# Patient Record
Sex: FEMALE | Race: ASIAN | NOT HISPANIC OR LATINO | URBAN - METROPOLITAN AREA
[De-identification: names, ages, dates, MRNs, and addresses within clinical notes are randomized per-mention and may not be internally consistent; named-entity substitution may affect disease eponyms.]

---

## 2024-04-16 ENCOUNTER — INPATIENT (INPATIENT)
Facility: HOSPITAL | Age: 59
LOS: 5 days | Discharge: HOME | DRG: 287 | End: 2024-04-22
Attending: INTERNAL MEDICINE | Admitting: INTERNAL MEDICINE
Payer: MEDICAID

## 2024-04-16 VITALS
HEART RATE: 61 BPM | HEIGHT: 66 IN | RESPIRATION RATE: 17 BRPM | SYSTOLIC BLOOD PRESSURE: 106 MMHG | DIASTOLIC BLOOD PRESSURE: 70 MMHG | WEIGHT: 130.07 LBS | TEMPERATURE: 100 F | OXYGEN SATURATION: 99 %

## 2024-04-16 DIAGNOSIS — E05.90 THYROTOXICOSIS, UNSPECIFIED WITHOUT THYROTOXIC CRISIS OR STORM: ICD-10-CM

## 2024-04-16 LAB
ADD ON TEST-SPECIMEN IN LAB: SIGNIFICANT CHANGE UP
ADD ON TEST-SPECIMEN IN LAB: SIGNIFICANT CHANGE UP
ALBUMIN SERPL ELPH-MCNC: 3.9 G/DL — SIGNIFICANT CHANGE UP (ref 3.3–5)
ALP SERPL-CCNC: 71 U/L — SIGNIFICANT CHANGE UP (ref 40–120)
ALT FLD-CCNC: 10 U/L — SIGNIFICANT CHANGE UP (ref 10–45)
ANION GAP SERPL CALC-SCNC: 13 MMOL/L — SIGNIFICANT CHANGE UP (ref 5–17)
APPEARANCE UR: CLEAR — SIGNIFICANT CHANGE UP
APTT BLD: 28.3 SEC — SIGNIFICANT CHANGE UP (ref 24.5–35.6)
AST SERPL-CCNC: 17 U/L — SIGNIFICANT CHANGE UP (ref 10–40)
BACTERIA # UR AUTO: NEGATIVE /HPF — SIGNIFICANT CHANGE UP
BASE EXCESS BLDV CALC-SCNC: 2.4 MMOL/L — SIGNIFICANT CHANGE UP (ref -2–3)
BASOPHILS # BLD AUTO: 0.03 K/UL — SIGNIFICANT CHANGE UP (ref 0–0.2)
BASOPHILS NFR BLD AUTO: 0.5 % — SIGNIFICANT CHANGE UP (ref 0–2)
BILIRUB SERPL-MCNC: 0.3 MG/DL — SIGNIFICANT CHANGE UP (ref 0.2–1.2)
BILIRUB UR-MCNC: NEGATIVE — SIGNIFICANT CHANGE UP
BUN SERPL-MCNC: 10 MG/DL — SIGNIFICANT CHANGE UP (ref 7–23)
CA-I SERPL-SCNC: 1.21 MMOL/L — SIGNIFICANT CHANGE UP (ref 1.15–1.33)
CALCIUM SERPL-MCNC: 8.8 MG/DL — SIGNIFICANT CHANGE UP (ref 8.4–10.5)
CAST: 0 /LPF — SIGNIFICANT CHANGE UP (ref 0–4)
CHLORIDE BLDV-SCNC: 102 MMOL/L — SIGNIFICANT CHANGE UP (ref 96–108)
CHLORIDE SERPL-SCNC: 103 MMOL/L — SIGNIFICANT CHANGE UP (ref 96–108)
CO2 BLDV-SCNC: 28 MMOL/L — HIGH (ref 22–26)
CO2 SERPL-SCNC: 22 MMOL/L — SIGNIFICANT CHANGE UP (ref 22–31)
COLOR SPEC: YELLOW — SIGNIFICANT CHANGE UP
CREAT SERPL-MCNC: 0.68 MG/DL — SIGNIFICANT CHANGE UP (ref 0.5–1.3)
DIFF PNL FLD: ABNORMAL
EGFR: 100 ML/MIN/1.73M2 — SIGNIFICANT CHANGE UP
EOSINOPHIL # BLD AUTO: 0.13 K/UL — SIGNIFICANT CHANGE UP (ref 0–0.5)
EOSINOPHIL NFR BLD AUTO: 2.1 % — SIGNIFICANT CHANGE UP (ref 0–6)
FLUAV AG NPH QL: SIGNIFICANT CHANGE UP
FLUBV AG NPH QL: SIGNIFICANT CHANGE UP
GAS PNL BLDV: 134 MMOL/L — LOW (ref 136–145)
GAS PNL BLDV: SIGNIFICANT CHANGE UP
GLUCOSE BLDV-MCNC: 105 MG/DL — HIGH (ref 70–99)
GLUCOSE SERPL-MCNC: 105 MG/DL — HIGH (ref 70–99)
GLUCOSE UR QL: NEGATIVE MG/DL — SIGNIFICANT CHANGE UP
HCO3 BLDV-SCNC: 27 MMOL/L — SIGNIFICANT CHANGE UP (ref 22–29)
HCT VFR BLD CALC: 39.4 % — SIGNIFICANT CHANGE UP (ref 34.5–45)
HCT VFR BLDA CALC: 40 % — SIGNIFICANT CHANGE UP (ref 34.5–46.5)
HGB BLD CALC-MCNC: 13.3 G/DL — SIGNIFICANT CHANGE UP (ref 11.7–16.1)
HGB BLD-MCNC: 12.8 G/DL — SIGNIFICANT CHANGE UP (ref 11.5–15.5)
IMM GRANULOCYTES NFR BLD AUTO: 0.3 % — SIGNIFICANT CHANGE UP (ref 0–0.9)
INR BLD: 0.97 RATIO — SIGNIFICANT CHANGE UP (ref 0.85–1.18)
KETONES UR-MCNC: NEGATIVE MG/DL — SIGNIFICANT CHANGE UP
LACTATE BLDV-MCNC: 1.6 MMOL/L — SIGNIFICANT CHANGE UP (ref 0.5–2)
LEUKOCYTE ESTERASE UR-ACNC: ABNORMAL
LYMPHOCYTES # BLD AUTO: 1.71 K/UL — SIGNIFICANT CHANGE UP (ref 1–3.3)
LYMPHOCYTES # BLD AUTO: 27.1 % — SIGNIFICANT CHANGE UP (ref 13–44)
MCHC RBC-ENTMCNC: 30.7 PG — SIGNIFICANT CHANGE UP (ref 27–34)
MCHC RBC-ENTMCNC: 32.5 GM/DL — SIGNIFICANT CHANGE UP (ref 32–36)
MCV RBC AUTO: 94.5 FL — SIGNIFICANT CHANGE UP (ref 80–100)
MONOCYTES # BLD AUTO: 0.48 K/UL — SIGNIFICANT CHANGE UP (ref 0–0.9)
MONOCYTES NFR BLD AUTO: 7.6 % — SIGNIFICANT CHANGE UP (ref 2–14)
NEUTROPHILS # BLD AUTO: 3.95 K/UL — SIGNIFICANT CHANGE UP (ref 1.8–7.4)
NEUTROPHILS NFR BLD AUTO: 62.4 % — SIGNIFICANT CHANGE UP (ref 43–77)
NITRITE UR-MCNC: NEGATIVE — SIGNIFICANT CHANGE UP
NRBC # BLD: 0 /100 WBCS — SIGNIFICANT CHANGE UP (ref 0–0)
NT-PROBNP SERPL-SCNC: 236 PG/ML — SIGNIFICANT CHANGE UP (ref 0–300)
PCO2 BLDV: 42 MMHG — SIGNIFICANT CHANGE UP (ref 39–42)
PH BLDV: 7.42 — SIGNIFICANT CHANGE UP (ref 7.32–7.43)
PH UR: 6.5 — SIGNIFICANT CHANGE UP (ref 5–8)
PLATELET # BLD AUTO: 152 K/UL — SIGNIFICANT CHANGE UP (ref 150–400)
PO2 BLDV: 48 MMHG — HIGH (ref 25–45)
POTASSIUM BLDV-SCNC: 4 MMOL/L — SIGNIFICANT CHANGE UP (ref 3.5–5.1)
POTASSIUM SERPL-MCNC: 4.2 MMOL/L — SIGNIFICANT CHANGE UP (ref 3.5–5.3)
POTASSIUM SERPL-SCNC: 4.2 MMOL/L — SIGNIFICANT CHANGE UP (ref 3.5–5.3)
PROT SERPL-MCNC: 6.4 G/DL — SIGNIFICANT CHANGE UP (ref 6–8.3)
PROT UR-MCNC: NEGATIVE MG/DL — SIGNIFICANT CHANGE UP
PROTHROM AB SERPL-ACNC: 10.2 SEC — SIGNIFICANT CHANGE UP (ref 9.5–13)
RBC # BLD: 4.17 M/UL — SIGNIFICANT CHANGE UP (ref 3.8–5.2)
RBC # FLD: 12.7 % — SIGNIFICANT CHANGE UP (ref 10.3–14.5)
RBC CASTS # UR COMP ASSIST: 2 /HPF — SIGNIFICANT CHANGE UP (ref 0–4)
REVIEW: SIGNIFICANT CHANGE UP
RSV RNA NPH QL NAA+NON-PROBE: SIGNIFICANT CHANGE UP
SAO2 % BLDV: 82.6 % — SIGNIFICANT CHANGE UP (ref 67–88)
SARS-COV-2 RNA SPEC QL NAA+PROBE: SIGNIFICANT CHANGE UP
SODIUM SERPL-SCNC: 138 MMOL/L — SIGNIFICANT CHANGE UP (ref 135–145)
SP GR SPEC: 1.01 — SIGNIFICANT CHANGE UP (ref 1–1.03)
SQUAMOUS # UR AUTO: 1 /HPF — SIGNIFICANT CHANGE UP (ref 0–5)
TROPONIN T, HIGH SENSITIVITY RESULT: <6 NG/L — SIGNIFICANT CHANGE UP (ref 0–51)
UROBILINOGEN FLD QL: 0.2 MG/DL — SIGNIFICANT CHANGE UP (ref 0.2–1)
WBC # BLD: 6.32 K/UL — SIGNIFICANT CHANGE UP (ref 3.8–10.5)
WBC # FLD AUTO: 6.32 K/UL — SIGNIFICANT CHANGE UP (ref 3.8–10.5)
WBC UR QL: 2 /HPF — SIGNIFICANT CHANGE UP (ref 0–5)

## 2024-04-16 PROCEDURE — 71046 X-RAY EXAM CHEST 2 VIEWS: CPT | Mod: 26

## 2024-04-16 PROCEDURE — 99291 CRITICAL CARE FIRST HOUR: CPT

## 2024-04-16 PROCEDURE — 93308 TTE F-UP OR LMTD: CPT | Mod: 26

## 2024-04-16 PROCEDURE — 99285 EMERGENCY DEPT VISIT HI MDM: CPT

## 2024-04-16 PROCEDURE — 93010 ELECTROCARDIOGRAM REPORT: CPT

## 2024-04-16 PROCEDURE — 71275 CT ANGIOGRAPHY CHEST: CPT | Mod: 26,MC

## 2024-04-16 PROCEDURE — 99152 MOD SED SAME PHYS/QHP 5/>YRS: CPT

## 2024-04-16 PROCEDURE — 93460 R&L HRT ART/VENTRICLE ANGIO: CPT | Mod: 26

## 2024-04-16 RX ORDER — METOCLOPRAMIDE HCL 10 MG
10 TABLET ORAL ONCE
Refills: 0 | Status: COMPLETED | OUTPATIENT
Start: 2024-04-16 | End: 2024-04-16

## 2024-04-16 RX ORDER — SODIUM CHLORIDE 9 MG/ML
1000 INJECTION, SOLUTION INTRAVENOUS ONCE
Refills: 0 | Status: COMPLETED | OUTPATIENT
Start: 2024-04-16 | End: 2024-04-16

## 2024-04-16 RX ORDER — SODIUM CHLORIDE 9 MG/ML
1000 INJECTION, SOLUTION INTRAVENOUS ONCE
Refills: 0 | Status: DISCONTINUED | OUTPATIENT
Start: 2024-04-16 | End: 2024-04-16

## 2024-04-16 RX ORDER — CHLORHEXIDINE GLUCONATE 213 G/1000ML
1 SOLUTION TOPICAL
Refills: 0 | Status: DISCONTINUED | OUTPATIENT
Start: 2024-04-16 | End: 2024-04-20

## 2024-04-16 RX ORDER — PIPERACILLIN AND TAZOBACTAM 4; .5 G/20ML; G/20ML
3.38 INJECTION, POWDER, LYOPHILIZED, FOR SOLUTION INTRAVENOUS ONCE
Refills: 0 | Status: COMPLETED | OUTPATIENT
Start: 2024-04-16 | End: 2024-04-16

## 2024-04-16 RX ORDER — NOREPINEPHRINE BITARTRATE/D5W 8 MG/250ML
0.05 PLASTIC BAG, INJECTION (ML) INTRAVENOUS
Qty: 8 | Refills: 0 | Status: DISCONTINUED | OUTPATIENT
Start: 2024-04-16 | End: 2024-04-16

## 2024-04-16 RX ORDER — ACETAMINOPHEN 500 MG
1000 TABLET ORAL ONCE
Refills: 0 | Status: COMPLETED | OUTPATIENT
Start: 2024-04-16 | End: 2024-04-16

## 2024-04-16 RX ORDER — VANCOMYCIN HCL 1 G
1000 VIAL (EA) INTRAVENOUS ONCE
Refills: 0 | Status: COMPLETED | OUTPATIENT
Start: 2024-04-16 | End: 2024-04-16

## 2024-04-16 RX ORDER — DOPAMINE HYDROCHLORIDE 40 MG/ML
5 INJECTION, SOLUTION, CONCENTRATE INTRAVENOUS
Qty: 400 | Refills: 0 | Status: DISCONTINUED | OUTPATIENT
Start: 2024-04-16 | End: 2024-04-17

## 2024-04-16 RX ADMIN — Medication 5.53 MICROGRAM(S)/KG/MIN: at 14:33

## 2024-04-16 RX ADMIN — Medication 10 MILLIGRAM(S): at 10:34

## 2024-04-16 RX ADMIN — DOPAMINE HYDROCHLORIDE 11.1 MICROGRAM(S)/KG/MIN: 40 INJECTION, SOLUTION, CONCENTRATE INTRAVENOUS at 20:04

## 2024-04-16 RX ADMIN — Medication 400 MILLIGRAM(S): at 10:35

## 2024-04-16 RX ADMIN — SODIUM CHLORIDE 1000 MILLILITER(S): 9 INJECTION, SOLUTION INTRAVENOUS at 10:34

## 2024-04-16 RX ADMIN — Medication 250 MILLIGRAM(S): at 14:34

## 2024-04-16 RX ADMIN — SODIUM CHLORIDE 1000 MILLILITER(S): 9 INJECTION, SOLUTION INTRAVENOUS at 14:34

## 2024-04-16 RX ADMIN — PIPERACILLIN AND TAZOBACTAM 200 GRAM(S): 4; .5 INJECTION, POWDER, LYOPHILIZED, FOR SOLUTION INTRAVENOUS at 13:47

## 2024-04-16 RX ADMIN — Medication 5.53 MICROGRAM(S)/KG/MIN: at 20:05

## 2024-04-16 NOTE — ED ADULT NURSE REASSESSMENT NOTE - NS ED NURSE REASSESS COMMENT FT1
Pt remains hypotensive, pt A&Ox4. Pt still endorses mild dizziness, denies chest pain, sob, palpitations, N/V. Norepinephrine gtt initiated at this time at 0.5mcg/kg/min per MD Crawley.

## 2024-04-16 NOTE — ED PROVIDER NOTE - PROGRESS NOTE DETAILS
DO Misbah (PGY-3): Patient's EKG is now showing bigeminy with frequent PVCs at a rate between 55 and 60. Will obtain TSH, troponin, BNP. Cardiology has been consulted and will evaluate the patient at bedside. Cardiology is recommending echocardiogram at this time. DO Misbah (PGY-3): Patient has persistently been hypotensive, after 2 L of LR mean arterial pressure is 61. Will administer third liter and start Levophed. MICU has been consulted and will evaluate the patient at bedside. DO Misbah (PGY-3): Telemetry monitoring shows second-degree type II heart block. EP has been consulted. Dr. Fish:  Patient received in signout pending MICU evaluation.  Patient on 0.1 of levo continues to be bradycardia and bigeminy and trigeminy as well as hypotensive with a dopplerable blood pressure at 110 systolic.  She is awake and alert.  Well-perfused.  Given concerns for second-degree type II as well as bigeminy and trigeminy EP was consulted after discussion with MICU attending CCU was also consulted.  Patient ultimately dispo to CCU

## 2024-04-16 NOTE — H&P ADULT - NSHPPHYSICALEXAM_GEN_ALL_CORE
PHYSICAL EXAM:  GENERAL: No acute distress, well-developed  HEAD:  Atraumatic, Normocephalic  EYES: EOMI, PERRLA, conjunctiva and sclera clear  NECK: Supple, no lymphadenopathy, no JVD  CHEST/LUNG: CTAB; No wheezes, rales, or rhonchi  HEART: Regular rate and rhythm. Normal S1/S2. No murmurs, rubs, or gallops  ABDOMEN: Soft, non-tender, non-distended; normal bowel sounds, no organomegaly  EXTREMITIES:  2+ peripheral pulses b/l, No clubbing, cyanosis, or edema  NEUROLOGY: A&O x 3, no focal deficits  SKIN: No rashes or lesions    CAPILLARY BLOOD GLUCOSE  POCT Blood Glucose.: 98 mg/dL (16 Apr 2024 12:52)    ICU Vital Signs Last 24 Hrs  T(C): 36.9 (16 Apr 2024 16:48), Max: 38.4 (16 Apr 2024 10:17)  T(F): 98.5 (16 Apr 2024 16:48), Max: 101.2 (16 Apr 2024 10:17)  HR: 70 (16 Apr 2024 18:40) (35 - 70)  BP: 132/66 (16 Apr 2024 18:40) (80/40 - 137/58)  BP(mean): 78 (16 Apr 2024 17:55) (56 - 78)  RR: 15 (16 Apr 2024 18:40) (15 - 18)  SpO2: 96% (16 Apr 2024 18:40) (95% - 100%)    O2 Parameters below as of 16 Apr 2024 18:40  Patient On (Oxygen Delivery Method): room air

## 2024-04-16 NOTE — ED ADULT NURSE NOTE - NSFALLUNIVINTERV_ED_ALL_ED
Bed/Stretcher in lowest position, wheels locked, appropriate side rails in place/Call bell, personal items and telephone in reach/Instruct patient to call for assistance before getting out of bed/chair/stretcher/Non-slip footwear applied when patient is off stretcher/Marcus to call system/Physically safe environment - no spills, clutter or unnecessary equipment/Purposeful proactive rounding/Room/bathroom lighting operational, light cord in reach

## 2024-04-16 NOTE — H&P ADULT - HISTORY OF PRESENT ILLNESS
58-year-old female with a past medical history of hyperthyroidism on methimazole presenting with right-sided chest pain, cough, headache since Friday. Patient also complaining of chills and sweats. Patient had 1 episode of hemoptysis 2 days ago. associated left-sided headache. Patients coworker had COVID last week. EKG in the ED revealed sinus bradycardia with frequent PVCs. Febrile to 101.2 on admission. RVP and UA negative, CTA negative for PE. Also reports taking Methimazole at home without consistent appts or TSH being checked with question of methimazole toxicity. Hypotensive to 80/40 on admission despite 2L of IVF so started on Levophed. EP consulted and said no need for TVP at this time. Admitted to CICU for further workup.

## 2024-04-16 NOTE — PATIENT PROFILE ADULT - CONTRAINDICATIONS & PRECAUTIONS (SELECT ALL THAT APPLY)
Pneum 20 and TDAP todat    HTN  - Your blood pressure is at goal today- goal is less than 130/80  - Continue your current medications  - Weight loss can help lower your bp!  Work on a healthy whole food diet and add at least 30min of exercise 5 days per week  - Work on a low salt diet          Please follow up in JULY for HTN or as needed.       ** If labs or imaging ordered at today's visit, all the non-urgent results will be discussed at your next visit    If you have been referred for a special test or to a specialist please call  [phone] to schedule an appointment.  If you have any further questions, or if develop new or worsened symptoms, please give our office a call at [phone].    Patient/surrogate refused vaccine...

## 2024-04-16 NOTE — CONSULT NOTE ADULT - SUBJECTIVE AND OBJECTIVE BOX
MICU CONSULT NOTE:    HPI:      PAST MEDICAL & SURGICAL HISTORY:  Hyperthyroidism    FAMILY HISTORY:    SOCIAL HISTORY:  Tobacco Use [ ] y [ ] n  Alcohol Use [ ] y [ ] n  Drug Use [ ] y [ ] n  [ ] Unable to assess because ________     Allergies  No Known Allergies  Intolerances    Home Medications:  methimazole    REVIEW OF SYSTEMS:  [x] All other systems negative except as per HPI  [ ] Unable to assess ROS because ________    OBJECTIVE:  T(F): 98.5 (04-16-24 @ 16:48), Max: 101.2 (04-16-24 @ 10:17)  HR: 47 (04-16-24 @ 17:55) (35 - 64)  BP: 137/58 (04-16-24 @ 17:55) (80/40 - 137/58)  BP(mean): 78 (04-16-24 @ 17:55) (56 - 78)  ABP: --  ABP(mean): --  RR: 16 (04-16-24 @ 17:55) (15 - 18)  SpO2: 99% (04-16-24 @ 17:55) (95% - 100%)    I/O Summary 24H    CAPILLARY BLOOD GLUCOSE      POCT Blood Glucose.: 98 mg/dL (16 Apr 2024 12:52)      PHYSICAL EXAM:  GENERAL: NAD, lying in bed comfortably  HEAD:  Atraumatic, normocephalic  EYES: EOMI, PERRLA, conjunctiva and sclera clear  ENT: Moist mucous membranes  NECK: Supple, no JVD  HEART: S1, S2, regular rate and rhythm, no murmurs, rubs, or gallops  LUNGS: Unlabored respirations.  Clear to auscultation bilaterally, no crackles, wheezing, or rhonchi  ABDOMEN: Soft, nontender, nondistended, +BS  EXTREMITIES: 2+ peripheral pulses bilaterally. No clubbing, cyanosis, or edema  NERVOUS SYSTEM:  A&Ox3, no focal deficits   SKIN: No rashes or lesions    HOSPITAL MEDICATIONS:  MEDICATIONS  (STANDING):  lactated ringers Bolus 1000 milliLiter(s) IV Bolus once  norepinephrine Infusion 0.05 MICROgram(s)/kG/Min (5.53 mL/Hr) IV Continuous <Continuous>    MEDICATIONS  (PRN):      LABS:  CBC 04-16-24 @ 10:40                        12.8   6.32  )-----------( 152                   39.4       Hgb trend: 12.8 <--   WBC trend: 6.32 <--     CMP 04-16-24 @ 10:40    138  |  103  |  10  ----------------------------<  105<H>  4.2   |  22  |  0.68    Ca    8.8      04-16-24 @ 10:40  Phos  2.9     04-16  Mg     1.8     04-16    TPro  6.4  /  Alb  3.9  /  TBili  0.3  /  DBili  x   /  AST  17  /  ALT  10  /  AlkPhos  71  04-16      Serum Cr trend: 0.68 <--   PT/INR - ( 16 Apr 2024 10:40 )   PT: 10.2 sec;   INR: 0.97 ratio         PTT - ( 16 Apr 2024 10:40 ):28.3 sec    ABG Trend:     VBG Trend:   04-16-24 @ 10:40 - pH: 7.42  | pCO2: 42    | pO2: 48    | HCO3: 27    | Lactate: 1.6        MICROBIOLOGY:       RADIOLOGY:  [ ] Reviewed and interpreted by me    EKG    ------------------------------------------------------------------------------------------------    ASSESSMENT & RECOMMENDATIONS:    Patient is not a MICU candidate at this time; please reconsult as necessary.    Case d/w Dr. Garcia    *******************************  Gareth Morgan MD (PGY-2)  Internal Medicine  Contact via [employer] TEAMS  *******************************    ***note not final until attested by attending*** MICU CONSULT NOTE:    HPI:  58F w/ hyperthyroidism (on methimazole) who presented to the ED w/ R-sided chest pain, cough, headache since Friday a/w chills and sweats and 1 episode of blood-tinged sputum. MICU consulted for persistent hypotension despite 3L IVF requiring pressor support w/ Levophed. On evaluation, patient AAOx4, reports she came to ED due to R-sided chest/rib pain, cough, and headache, thought it was due to COVID, states she feels well and wants to go home. She also endorsed poor PO intake over the past several days but denied nausea, vomiting, diarrhea, abdominal pain, SOB, dizziness/lightheadedness, palpitations, LE swelling, sick contacts, or recent travel. While being evaluated, patient was mapping high 50s-low 60s, so started on Levophed for pressor support. Additionally, patient noted to be febrile to 101.2 and bradycardic to 50s w/ significant ectopy noted on telemetry (bigemy, trigeminy), concerning for possible 2nd degree type II block.     PAST MEDICAL & SURGICAL HISTORY:  Hyperthyroidism    FAMILY HISTORY:    SOCIAL HISTORY:  Tobacco Use [ ] y [ ] n  Alcohol Use [ ] y [ ] n  Drug Use [ ] y [ ] n  [ ] Unable to assess because ________     Allergies  No Known Allergies  Intolerances    Home Medications:  methimazole    REVIEW OF SYSTEMS:  [x] All other systems negative except as per HPI  [ ] Unable to assess ROS because ________    OBJECTIVE:  T(F): 98.5 (04-16-24 @ 16:48), Max: 101.2 (04-16-24 @ 10:17)  HR: 47 (04-16-24 @ 17:55) (35 - 64)  BP: 137/58 (04-16-24 @ 17:55) (80/40 - 137/58)  BP(mean): 78 (04-16-24 @ 17:55) (56 - 78)  ABP: --  ABP(mean): --  RR: 16 (04-16-24 @ 17:55) (15 - 18)  SpO2: 99% (04-16-24 @ 17:55) (95% - 100%)    I/O Summary 24H    CAPILLARY BLOOD GLUCOSE  POCT Blood Glucose.: 98 mg/dL (16 Apr 2024 12:52)    PHYSICAL EXAM:  GENERAL: NAD, lying in bed comfortably  HEAD:  Atraumatic, normocephalic  EYES: EOMI, PERRLA, conjunctiva and sclera clear  ENT: dry mucous membranes  NECK: Supple, no JVD  HEART: S1, S2, bradycardic  LUNGS: Unlabored respirations.  Clear to auscultation bilaterally, no crackles, wheezing, or rhonchi  ABDOMEN: Soft, nontender, nondistended, +BS  EXTREMITIES: 2+ peripheral pulses bilaterally. No clubbing, cyanosis, or edema  NERVOUS SYSTEM:  A&Ox3, no focal deficits   SKIN: No rashes or lesions    HOSPITAL MEDICATIONS:  MEDICATIONS  (STANDING):  lactated ringers Bolus 1000 milliLiter(s) IV Bolus once  norepinephrine Infusion 0.05 MICROgram(s)/kG/Min (5.53 mL/Hr) IV Continuous <Continuous>    MEDICATIONS  (PRN):    LABS:  CBC 04-16-24 @ 10:40                        12.8   6.32  )-----------( 152                   39.4       Hgb trend: 12.8 <--   WBC trend: 6.32 <--     CMP 04-16-24 @ 10:40    138  |  103  |  10  ----------------------------<  105<H>  4.2   |  22  |  0.68    Ca    8.8      04-16-24 @ 10:40  Phos  2.9     04-16  Mg     1.8     04-16    TPro  6.4  /  Alb  3.9  /  TBili  0.3  /  DBili  x   /  AST  17  /  ALT  10  /  AlkPhos  71  04-16    Serum Cr trend: 0.68 <--   PT/INR - ( 16 Apr 2024 10:40 )   PT: 10.2 sec;   INR: 0.97 ratio    PTT - ( 16 Apr 2024 10:40 ):28.3 sec  ABG Trend:     VBG Trend:   04-16-24 @ 10:40 - pH: 7.42  | pCO2: 42    | pO2: 48    | HCO3: 27    | Lactate: 1.6      MICROBIOLOGY:     RADIOLOGY:  [x] Reviewed and interpreted by me  < from: Xray Chest 2 Views PA/Lat (04.16.24 @ 10:32) >  FINDINGS:    Support devices: None.    Lungs/Pleura: No focal parenchymal opacities.  No pneumothorax. No   pleural effusions.  No pulmonary vascular congestion.    Heart/Mediastinum: The cardiomediastinal silhouette is within normal   limits.    Skeletal/soft tissues: No acute pathology.    IMPRESSION:  No evidence of acute cardiopulmonary disease.    < from: CT Angio Chest PE Protocol w/ IV Cont (04.16.24 @ 14:33) >  FINDINGS:    LUNGS AND AIRWAYS: Patent central airways.  Dependent atelectasis.  PLEURA: No pleural effusion.  MEDIASTINUM AND ALANA: No lymphadenopathy.  VESSELS: No pulmonary embolus  HEART: Heart size is normal. No pericardial effusion.  CHEST WALL AND LOWER NECK: Bilateral breast implants  VISUALIZED UPPER ABDOMEN: Within normal limits.  BONES: Within normal limits.    IMPRESSION:  No pulmonary embolus    EKG: sinus bradycardia w/ PVCs on initial EKG, followed by 2nd EKG showing possible bigem/trigem    ------------------------------------------------------------------------------------------------    ASSESSMENT & RECOMMENDATIONS:  58F w/ hyperthyroidism (on methimazole) who presented to the ED w/ R-sided chest pain, cough, headache since Friday a/w chills and sweats and 1 episode of blood-tinged sputum. MICU consulted for persistent hypotension despite 3L IVF requiring pressor support w/ Levophed. Telemetry w/ bradycardia to 50s w/ significant ectopy (bigemy, trigeminy), concerning for possible 2nd degree type II block.    #Hypotension:  #Trigeminy/Bigeminy:  #Fever:  - suspect patient's hypotension may be multifactorial 2/2 heart block versus hypothyroidism (2/2 methimazole) versus sepsis  - agree w/ infectious workup and empiric antibiotics  - would consult EP regarding bigeminy/trigeminy  - would check thyroid studies, AM cortisol  - would monitor electrolytes and maintain K>4, Mg>2    After discussions w/ cardiology and CICU, patient to be admitted to CICU under Dr. Hastings for further management; patient not a MICU candidate at this time    Case d/w Dr. Garcia    *******************************  Gareth Morgan MD (PGY-2)  Internal Medicine  Contact via [employer] TEAMS  *******************************    ***note not final until attested by attending***

## 2024-04-16 NOTE — ED PROVIDER NOTE - PHYSICAL EXAMINATION
General: Appears well and nontoxic  Mentation: AAO x 3  psych: mood appropriate  HEENT: airway patent, conjunctivae clear bilaterally  Resp: symmetric chest rise, no resp distress, breath sounds CTA bilaterally  Cardio: RRR, no m/r/g  GI: soft/nondistended/nontender  Neuro: sensation and motor function grossly intact  Skin: no cyanosis, no jaundice, no rash on the right chest wall or back  MSK: normal movement of all extremities  Lymph/Vasc: no LE edema

## 2024-04-16 NOTE — ED ADULT NURSE NOTE - NS ED NURSE TRANSPORT WITH
transported with RN , EDT, and MD/Cardiac Monitor/Defib/ACLS/Rescue Kit/O2/BVM/IV pump/ACLS Rescue Kit

## 2024-04-16 NOTE — ED ADULT NURSE NOTE - OBJECTIVE STATEMENT
59yo F A&Ox4, pmhx hyperthyroidism, presents to ED brought in by EMS from home with daughter at bedside. pt primarily Bengali speaking, declines  services, daughter at bedside translating. pt presents with c/o 5 days of cough, headache, subjective fevers, generalized weakness and intermittent R sided rib pain. pt denies any recent travel; states her coworker was sick with COVID last week. pt denies any associated nausea, vomiting, diarrhea, abdominal pain, chest pain, sob, or urinary symptoms. on exam pt appears uncomfortable in pain. pt is A&Ox4, awake and alert, breathing even and unlabored, moving all extremities, + febrile to 101.2 rectally, abd soft nt nd, + ttp R rib area. pt seen and eval by ED MD. IV placed to LUE, labs drawn and sent. Patient undressed and placed into gown, call bell placed within reach and appropriate side rails up for safety. plan of care discussed.

## 2024-04-16 NOTE — CONSULT NOTE ADULT - SUBJECTIVE AND OBJECTIVE BOX
DATE OF SERVICE: 04-16-24 @ 21:21    Patient is a 58y old  Female who presents with a chief complaint of Bradycardia (16 Apr 2024 19:11)      INTERVAL HISTORY:     TELEMETRY Personally reviewed:  	  MEDICATIONS:  DOPamine Infusion 5 MICROgram(s)/kG/Min IV Continuous <Continuous>  norepinephrine Infusion 0.05 MICROgram(s)/kG/Min IV Continuous <Continuous>        PHYSICAL EXAM:  T(C): 36.9 (04-16-24 @ 19:10), Max: 38.4 (04-16-24 @ 10:17)  HR: 90 (04-16-24 @ 21:00) (35 - 90)  BP: 116/56 (04-16-24 @ 21:00) (65/46 - 162/72)  RR: 15 (04-16-24 @ 21:00) (15 - 22)  SpO2: 95% (04-16-24 @ 21:00) (95% - 100%)  Wt(kg): --  I&O's Summary    Height (cm): 167.6 (04-16 @ 09:57)  Weight (kg): 59 (04-16 @ 09:57)  BMI (kg/m2): 21 (04-16 @ 09:57)  BSA (m2): 1.67 (04-16 @ 09:57)    Appearance: In no distress	  HEENT:    PERRL, EOMI	  Cardiovascular:  S1 S2, No JVD  Respiratory: Lungs clear to auscultation	  Gastrointestinal:  Soft, Non-tender, + BS	  Vascularature:  No edema of LE  Psychiatric: Appropriate affect   Neuro: no acute focal deficits                               12.8   6.32  )-----------( 152      ( 16 Apr 2024 10:40 )             39.4     04-16    138  |  103  |  10  ----------------------------<  105<H>  4.2   |  22  |  0.68    Ca    8.8      16 Apr 2024 10:40  Phos  2.9     04-16  Mg     1.8     04-16    TPro  6.4  /  Alb  3.9  /  TBili  0.3  /  DBili  x   /  AST  17  /  ALT  10  /  AlkPhos  71  04-16        Labs personally reviewed      ASSESSMENT/PLAN: 	    CCU consult for symptomatic bradycardia  EP consult for role for TVP    Cardiac care as per CCU         Cem Tovar DO [facility]  Cardiovascular Medicine  [address] [place] [address], [address]  Office: [phone]  Available via Text/call on [employer] Teams

## 2024-04-16 NOTE — ED PROVIDER NOTE - CLINICAL SUMMARY MEDICAL DECISION MAKING FREE TEXT BOX
Statement Selected Statement Selected Statement Selected Statement Selected Statement Selected Statement Selected Statement Selected Statement Selected Statement Selected Statement Selected Statement Selected Statement Selected Statement Selected Statement Selected Statement Selected Statement Selected 58-year-old female with a past medical history of hyperthyroidism on methimazole presenting with right-sided chest pain, cough, headache since Friday. Patient also complaining of chills and sweats. Patient had 1 episode of hemoptysis 2 days ago. associated left-sided headache. Denies changes in vision, neck stiffness, anterior chest pain, difficulty breathing, nausea, vomiting, abdominal pain, recent travel, weight loss, changes in vision. Patient's coworker had COVID last week. Patient is febrile in the emergency department. Physical exam reveals no rashes on the right chest wall or back, clear breath sounds, soft nontender nondistended abdomen. Most likely viral syndrome. Sepsis workup. 58-year-old female with a past medical history of hyperthyroidism on methimazole presenting with right-sided chest pain, cough, headache since Friday. Patient also complaining of chills and sweats. Patient had 1 episode of hemoptysis 2 days ago. associated left-sided headache. Denies changes in vision, neck stiffness, anterior chest pain, difficulty breathing, nausea, vomiting, abdominal pain, recent travel, weight loss, changes in vision. Patient's coworker had COVID last week. Patient is febrile in the emergency department. Physical exam reveals no rashes on the right chest wall or back, clear breath sounds, soft nontender nondistended abdomen. Most likely viral syndrome. Sepsis workup  reassess  . ZR

## 2024-04-16 NOTE — H&P ADULT - NSHPLABSRESULTS_GEN_ALL_CORE
12.8   6.32  )-----------( 152      ( 2024 10:40 )             39.4     -16    138  |  103  |  10  ----------------------------<  105<H>  4.2   |  22  |  0.68    Ca    8.8      2024 10:40  Phos  2.9     -  Mg     1.8     -    TPro  6.4  /  Alb  3.9  /  TBili  0.3  /  DBili  x   /  AST  17  /  ALT  10  /  AlkPhos  71  -16    Troponin T, High Sensitivity Result: <6 ng/L (24 @ 14:05)    LIVER FUNCTIONS - ( 2024 10:40 )  Alb: 3.9 g/dL / Pro: 6.4 g/dL / ALK PHOS: 71 U/L / ALT: 10 U/L / AST: 17 U/L / GGT: x           PT/INR - ( 2024 10:40 )   PT: 10.2 sec;   INR: 0.97 ratio    PTT - ( 2024 10:40 )  PTT:28.3 sec    Blood Gas Venous - Lactate: 1.6 mmol/L (24 @ 10:40)    Urinalysis Basic - ( 2024 14:47 )    Color: Yellow / Appearance: Clear / S.014 / pH: x  Gluc: x / Ketone: Negative mg/dL  / Bili: Negative / Urobili: 0.2 mg/dL   Blood: x / Protein: Negative mg/dL / Nitrite: Negative   Leuk Esterase: Trace / RBC: 2 /HPF / WBC 2 /HPF   Sq Epi: x / Non Sq Epi: 1 /HPF / Bacteria: Negative /HPF  ======================Micro/Rad/Cardio=================  Telemetry: Reviewed   EKG: Reviewed  CXR: Reviewed  ======================================================  PAST MEDICAL & SURGICAL HISTORY:  Hyperthyroidism

## 2024-04-16 NOTE — ED ADULT TRIAGE NOTE - CHIEF COMPLAINT QUOTE
cough, rib pain, dizzy, weakness, headache since Friday  one episode of blood in cough, no recent travel

## 2024-04-16 NOTE — H&P ADULT - ASSESSMENT
A/P:58-year-old female with a past medical history of hyperthyroidism on methimazole presenting with right-sided chest pain, cough, headache since Friday. Patient also complaining of chills and sweats. Patient had 1 episode of hemoptysis 2 days ago. associated left-sided headache. Patients coworker had COVID last week. EKG in the ED revealed sinus bradycardia with frequent PVCs. Febrile to 101.2 on admission. RVP and UA negative, CTA negative for PE. Also reports taking Methimazole at home without consistent appts or TSH being checked with question of methimazole toxicity. Hypotensive to 80/40 on admission despite 2L of IVF so started on Levophed. EP consulted and said no need for TVP at this time. Admitted to CICU for further workup.      Neuro:   Aox3, no active issues    Cardio:   #Shock- sepsis vs other etiology?   Presented with fever and hypotension, with recent COVID exposure  RVP and UA negative on admission   s/p Vanc/Zosyn in ED    Plan:   TTE Echo  c/w Dopamine (EP recommended)  empiric abx with Vanc/Zosyn  Blood/Ucx    #Sinus bradycardia with frequent PVCs  - unclear etiology, perhaps methimazole toxicity  - EP consulted, no need for TVP at this time  - c/w Dopamine gtt recommended by EP  - check TSH, T4, T3  - EP recs for PVC ablation     Pulmonary:   RA, CTM    Renal:   No active issues    Endocrine:   #Hyperthyrodism on Methimazole  - recommend TSH, T4, T3  - rest as above  - endocrine consult in AM  - AM cortisol     ID:   #Fever and hypotension  RVP and UA negative  Recommend empiric coverage w/ Vanc/Zosyn  Blood/Ucx, MRSA swab    Prophylactic:   SQH Dvt ppx  NPO@MN in case of procedure tmrw  Pending clinical improvement   ======================= DISPOSITION  =====================  [X] Critical   [ ] Guarded    [ ] Stable    [X] Maintain in CICU  [ ] Downgrade to Telemtry  [ ] Discharge Home    Patient requires continuous monitoring with bedside rhythm monitoring, pulse ox monitoring, and intermittent blood gas analysis. Care plan discussed with ICU care team. Patient remained critical and at risk for life threatening decompensation.  Patient seen, examined and plan discussed with CCU team during rounds.     I have personally provided 75 minutes of critical care time excluding time spent on separate procedures, in addition to initial critical care time provided by the CICU Attending, Dr[phone]    A/P:58F Hx hyperthyroidism (on methimazole) who presented to the ED w/ R-sided chest pain, cough, headache since Friday a/w chills and sweats and 1 episode of blood-tinged sputum.  Persistent hypotension despite 3L IVF in ED now requiring pressor support w/ Levophed. Telemetry w/ bradycardia to 50s w/ significant ectopy (bigemy, trigeminy), concerning for possible 2nd degree type II block.    Neuro:   Aox3, no active issues    Cardio:   #Sinus bradycardia with frequent PVCs, hypotension   - hypotension multifactorial 2/2 HB vs. hypothyroidism (2/2 methimazole) vs. sepsis  - unclear etiology, perhaps methimazole toxicity  - EP consulted, no need for TVP at this time  - start Dopamine, wean off Levo as able  - EP recs for PVC ablation  - Send TFTs   - Echo    Pulmonary:   RA, CTM    Renal:   No active issues    Endocrine:   #Hyperthyrodism on Methimazole  - recommend TSH, T4, T3  - rest as above  - endocrine consult in AM  - AM cortisol     ID:   #Fever and hypotension  RVP and UA negative  Recommend empiric coverage w/ Vanc/Zosyn  Blood/Ucx, MRSA swab    Prophylactic:   SQH Dvt ppx  NPO@MN in case of procedure tmrw  Pending clinical improvement   ======================= DISPOSITION  =====================  [X] Critical   [ ] Guarded    [ ] Stable    [X] Maintain in CICU  [ ] Downgrade to Telemtry  [ ] Discharge Home    Patient requires continuous monitoring with bedside rhythm monitoring, pulse ox monitoring, and intermittent blood gas analysis. Care plan discussed with ICU care team. Patient remained critical and at risk for life threatening decompensation.  Patient seen, examined and plan discussed with CCU team during rounds.     I have personally provided 75 minutes of critical care time excluding time spent on separate procedures, in addition to initial critical care time provided by the CICU Attending, Dr. Venkata Cantu [facility] [phone]

## 2024-04-16 NOTE — ED ADULT NURSE REASSESSMENT NOTE - NS ED NURSE REASSESS COMMENT FT1
Report received from KAYLYN Sinha. Pt primarily Thai speaking,  ID [id number]. Pt A&Ox4, following commands, sensory/ motor function intact, speaking coherently. Breathing spontaneously, and unlabored. Pt hypotensive and bradycardic. Pt endorses mild dizziness, denies chest pain, sob, palpitations, N/V. MD Crawley made aware. Pt on CM, second PIV placed, repeat EKG obtained. Per MD Crawley, pt to receive additional LR bolus.

## 2024-04-16 NOTE — ED ADULT NURSE REASSESSMENT NOTE - NS ED NURSE REASSESS COMMENT FT1
Pt A&Ox4, denies chest pain , sob, palpitations, N/V, endorsing mild dizziness. Levo increased to 0.1 mcg/kg/min , per MD Crawley. MD Fish at bed side for eval. Pt placed on defibrillator pads, additional ekg obtained. Electronic BP monitoring unable to read with multiple attempts, manuals obtained with difficulty. Bed side dopplers performed by MD Fish.

## 2024-04-16 NOTE — PATIENT PROFILE ADULT - INTERPRETATION SERVICES DECLINED
Patient/Caregiver requests family/friend to interpret. Detail Level: Simple Render Risk Assessment In Note?: no Additional Notes: Patient did not receive his prednisone from the [place]. Will resend RX and recommend he call if he does not receive his prescription. We also again discussed referral to an allergist for additional evaluation of a potential allergic response. We also discussed the possibility of treatment with dupixent as his symptoms cover approximately 30 % of his BSA. Will reasses after having taken prednisone. Will refill clobetasol.

## 2024-04-16 NOTE — PATIENT PROFILE ADULT - FALL HARM RISK - TYPE OF ASSESSMENT
Called patient [facility] for them with the results from the message from the provider. Referenced the phone number and department for any questions. Future lab order(s) placed.    Admission

## 2024-04-17 ENCOUNTER — RESULT REVIEW (OUTPATIENT)
Age: 59
End: 2024-04-17

## 2024-04-17 DIAGNOSIS — E05.00 THYROTOXICOSIS WITH DIFFUSE GOITER WITHOUT THYROTOXIC CRISIS OR STORM: ICD-10-CM

## 2024-04-17 DIAGNOSIS — H57.89 OTHER SPECIFIED DISORDERS OF EYE AND ADNEXA: ICD-10-CM

## 2024-04-17 LAB
ADD ON TEST-SPECIMEN IN LAB: SIGNIFICANT CHANGE UP
ADD ON TEST-SPECIMEN IN LAB: SIGNIFICANT CHANGE UP
ALBUMIN SERPL ELPH-MCNC: 4 G/DL — SIGNIFICANT CHANGE UP (ref 3.3–5)
ALBUMIN SERPL ELPH-MCNC: 4 G/DL — SIGNIFICANT CHANGE UP (ref 3.3–5)
ALP SERPL-CCNC: 79 U/L — SIGNIFICANT CHANGE UP (ref 40–120)
ALP SERPL-CCNC: 82 U/L — SIGNIFICANT CHANGE UP (ref 40–120)
ALT FLD-CCNC: 10 U/L — SIGNIFICANT CHANGE UP (ref 10–45)
ALT FLD-CCNC: 10 U/L — SIGNIFICANT CHANGE UP (ref 10–45)
ANION GAP SERPL CALC-SCNC: 11 MMOL/L — SIGNIFICANT CHANGE UP (ref 5–17)
ANION GAP SERPL CALC-SCNC: 14 MMOL/L — SIGNIFICANT CHANGE UP (ref 5–17)
APTT BLD: 27.9 SEC — SIGNIFICANT CHANGE UP (ref 24.5–35.6)
AST SERPL-CCNC: 17 U/L — SIGNIFICANT CHANGE UP (ref 10–40)
AST SERPL-CCNC: 18 U/L — SIGNIFICANT CHANGE UP (ref 10–40)
BASOPHILS # BLD AUTO: 0.03 K/UL — SIGNIFICANT CHANGE UP (ref 0–0.2)
BASOPHILS NFR BLD AUTO: 0.6 % — SIGNIFICANT CHANGE UP (ref 0–2)
BILIRUB SERPL-MCNC: 0.3 MG/DL — SIGNIFICANT CHANGE UP (ref 0.2–1.2)
BILIRUB SERPL-MCNC: 0.3 MG/DL — SIGNIFICANT CHANGE UP (ref 0.2–1.2)
BLD GP AB SCN SERPL QL: NEGATIVE — SIGNIFICANT CHANGE UP
BUN SERPL-MCNC: 10 MG/DL — SIGNIFICANT CHANGE UP (ref 7–23)
BUN SERPL-MCNC: 9 MG/DL — SIGNIFICANT CHANGE UP (ref 7–23)
CALCIUM SERPL-MCNC: 9.1 MG/DL — SIGNIFICANT CHANGE UP (ref 8.4–10.5)
CALCIUM SERPL-MCNC: 9.6 MG/DL — SIGNIFICANT CHANGE UP (ref 8.4–10.5)
CHLORIDE SERPL-SCNC: 101 MMOL/L — SIGNIFICANT CHANGE UP (ref 96–108)
CHLORIDE SERPL-SCNC: 102 MMOL/L — SIGNIFICANT CHANGE UP (ref 96–108)
CO2 SERPL-SCNC: 23 MMOL/L — SIGNIFICANT CHANGE UP (ref 22–31)
CO2 SERPL-SCNC: 26 MMOL/L — SIGNIFICANT CHANGE UP (ref 22–31)
CORTIS AM PEAK SERPL-MCNC: 10.4 UG/DL — SIGNIFICANT CHANGE UP (ref 6–18.4)
CREAT SERPL-MCNC: 0.58 MG/DL — SIGNIFICANT CHANGE UP (ref 0.5–1.3)
CREAT SERPL-MCNC: 0.69 MG/DL — SIGNIFICANT CHANGE UP (ref 0.5–1.3)
EGFR: 104 ML/MIN/1.73M2 — SIGNIFICANT CHANGE UP
EGFR: 99 ML/MIN/1.73M2 — SIGNIFICANT CHANGE UP
EOSINOPHIL # BLD AUTO: 0.16 K/UL — SIGNIFICANT CHANGE UP (ref 0–0.5)
EOSINOPHIL NFR BLD AUTO: 3.2 % — SIGNIFICANT CHANGE UP (ref 0–6)
ERYTHROCYTE [SEDIMENTATION RATE] IN BLOOD: 53 MM/HR — HIGH (ref 0–20)
GLUCOSE SERPL-MCNC: 133 MG/DL — HIGH (ref 70–99)
GLUCOSE SERPL-MCNC: 136 MG/DL — HIGH (ref 70–99)
HCT VFR BLD CALC: 40.9 % — SIGNIFICANT CHANGE UP (ref 34.5–45)
HGB BLD-MCNC: 13.3 G/DL — SIGNIFICANT CHANGE UP (ref 11.5–15.5)
IMM GRANULOCYTES NFR BLD AUTO: 0.8 % — SIGNIFICANT CHANGE UP (ref 0–0.9)
INR BLD: 0.89 RATIO — SIGNIFICANT CHANGE UP (ref 0.85–1.18)
LYMPHOCYTES # BLD AUTO: 1.8 K/UL — SIGNIFICANT CHANGE UP (ref 1–3.3)
LYMPHOCYTES # BLD AUTO: 35.6 % — SIGNIFICANT CHANGE UP (ref 13–44)
MAGNESIUM SERPL-MCNC: 2 MG/DL — SIGNIFICANT CHANGE UP (ref 1.6–2.6)
MAGNESIUM SERPL-MCNC: 2 MG/DL — SIGNIFICANT CHANGE UP (ref 1.6–2.6)
MCHC RBC-ENTMCNC: 31.2 PG — SIGNIFICANT CHANGE UP (ref 27–34)
MCHC RBC-ENTMCNC: 32.5 GM/DL — SIGNIFICANT CHANGE UP (ref 32–36)
MCV RBC AUTO: 96 FL — SIGNIFICANT CHANGE UP (ref 80–100)
MONOCYTES # BLD AUTO: 0.45 K/UL — SIGNIFICANT CHANGE UP (ref 0–0.9)
MONOCYTES NFR BLD AUTO: 8.9 % — SIGNIFICANT CHANGE UP (ref 2–14)
MRSA PCR RESULT.: SIGNIFICANT CHANGE UP
NEUTROPHILS # BLD AUTO: 2.57 K/UL — SIGNIFICANT CHANGE UP (ref 1.8–7.4)
NEUTROPHILS NFR BLD AUTO: 50.9 % — SIGNIFICANT CHANGE UP (ref 43–77)
NIGHT BLUE STAIN TISS: SIGNIFICANT CHANGE UP
NRBC # BLD: 0 /100 WBCS — SIGNIFICANT CHANGE UP (ref 0–0)
PHOSPHATE SERPL-MCNC: 3.5 MG/DL — SIGNIFICANT CHANGE UP (ref 2.5–4.5)
PLATELET # BLD AUTO: 154 K/UL — SIGNIFICANT CHANGE UP (ref 150–400)
POTASSIUM SERPL-MCNC: 3.7 MMOL/L — SIGNIFICANT CHANGE UP (ref 3.5–5.3)
POTASSIUM SERPL-MCNC: 3.8 MMOL/L — SIGNIFICANT CHANGE UP (ref 3.5–5.3)
POTASSIUM SERPL-SCNC: 3.7 MMOL/L — SIGNIFICANT CHANGE UP (ref 3.5–5.3)
POTASSIUM SERPL-SCNC: 3.8 MMOL/L — SIGNIFICANT CHANGE UP (ref 3.5–5.3)
PROCALCITONIN SERPL-MCNC: 0.05 NG/ML — SIGNIFICANT CHANGE UP (ref 0.02–0.1)
PROT SERPL-MCNC: 6.8 G/DL — SIGNIFICANT CHANGE UP (ref 6–8.3)
PROT SERPL-MCNC: 6.8 G/DL — SIGNIFICANT CHANGE UP (ref 6–8.3)
PROTHROM AB SERPL-ACNC: 9.4 SEC — LOW (ref 9.5–13)
RBC # BLD: 4.26 M/UL — SIGNIFICANT CHANGE UP (ref 3.8–5.2)
RBC # FLD: 12.6 % — SIGNIFICANT CHANGE UP (ref 10.3–14.5)
RH IG SCN BLD-IMP: POSITIVE — SIGNIFICANT CHANGE UP
S AUREUS DNA NOSE QL NAA+PROBE: SIGNIFICANT CHANGE UP
SODIUM SERPL-SCNC: 138 MMOL/L — SIGNIFICANT CHANGE UP (ref 135–145)
SODIUM SERPL-SCNC: 139 MMOL/L — SIGNIFICANT CHANGE UP (ref 135–145)
SPECIMEN SOURCE: SIGNIFICANT CHANGE UP
T3 SERPL-MCNC: 83 NG/DL — SIGNIFICANT CHANGE UP (ref 80–200)
T4 AB SER-ACNC: 7.6 UG/DL — SIGNIFICANT CHANGE UP (ref 4.6–12)
TSH SERPL-MCNC: 2.43 UIU/ML — SIGNIFICANT CHANGE UP (ref 0.27–4.2)
WBC # BLD: 5.05 K/UL — SIGNIFICANT CHANGE UP (ref 3.8–10.5)
WBC # FLD AUTO: 5.05 K/UL — SIGNIFICANT CHANGE UP (ref 3.8–10.5)

## 2024-04-17 PROCEDURE — 99254 IP/OBS CNSLTJ NEW/EST MOD 60: CPT | Mod: GC

## 2024-04-17 PROCEDURE — 99292 CRITICAL CARE ADDL 30 MIN: CPT

## 2024-04-17 PROCEDURE — 99291 CRITICAL CARE FIRST HOUR: CPT

## 2024-04-17 PROCEDURE — 93306 TTE W/DOPPLER COMPLETE: CPT | Mod: 26

## 2024-04-17 PROCEDURE — 99223 1ST HOSP IP/OBS HIGH 75: CPT

## 2024-04-17 PROCEDURE — 93010 ELECTROCARDIOGRAM REPORT: CPT

## 2024-04-17 RX ORDER — CEFEPIME 1 G/1
1000 INJECTION, POWDER, FOR SOLUTION INTRAMUSCULAR; INTRAVENOUS EVERY 12 HOURS
Refills: 0 | Status: DISCONTINUED | OUTPATIENT
Start: 2024-04-17 | End: 2024-04-17

## 2024-04-17 RX ORDER — ACETAMINOPHEN 500 MG
650 TABLET ORAL ONCE
Refills: 0 | Status: COMPLETED | OUTPATIENT
Start: 2024-04-17 | End: 2024-04-17

## 2024-04-17 RX ORDER — POLYETHYLENE GLYCOL 3350 17 G/17G
17 POWDER, FOR SOLUTION ORAL DAILY
Refills: 0 | Status: DISCONTINUED | OUTPATIENT
Start: 2024-04-18 | End: 2024-04-18

## 2024-04-17 RX ORDER — LANOLIN ALCOHOL/MO/W.PET/CERES
3 CREAM (GRAM) TOPICAL ONCE
Refills: 0 | Status: COMPLETED | OUTPATIENT
Start: 2024-04-17 | End: 2024-04-17

## 2024-04-17 RX ORDER — PHENYLEPHRINE HYDROCHLORIDE 10 MG/ML
0.1 INJECTION INTRAVENOUS
Qty: 40 | Refills: 0 | Status: DISCONTINUED | OUTPATIENT
Start: 2024-04-17 | End: 2024-04-18

## 2024-04-17 RX ORDER — CEFEPIME 1 G/1
INJECTION, POWDER, FOR SOLUTION INTRAMUSCULAR; INTRAVENOUS
Refills: 0 | Status: DISCONTINUED | OUTPATIENT
Start: 2024-04-17 | End: 2024-04-17

## 2024-04-17 RX ORDER — POTASSIUM CHLORIDE 20 MEQ
40 PACKET (EA) ORAL ONCE
Refills: 0 | Status: COMPLETED | OUTPATIENT
Start: 2024-04-17 | End: 2024-04-17

## 2024-04-17 RX ORDER — LANOLIN ALCOHOL/MO/W.PET/CERES
3 CREAM (GRAM) TOPICAL AT BEDTIME
Refills: 0 | Status: DISCONTINUED | OUTPATIENT
Start: 2024-04-17 | End: 2024-04-17

## 2024-04-17 RX ORDER — CEFEPIME 1 G/1
1000 INJECTION, POWDER, FOR SOLUTION INTRAMUSCULAR; INTRAVENOUS ONCE
Refills: 0 | Status: COMPLETED | OUTPATIENT
Start: 2024-04-17 | End: 2024-04-17

## 2024-04-17 RX ORDER — DOPAMINE HYDROCHLORIDE 40 MG/ML
5 INJECTION, SOLUTION, CONCENTRATE INTRAVENOUS
Qty: 400 | Refills: 0 | Status: DISCONTINUED | OUTPATIENT
Start: 2024-04-17 | End: 2024-04-18

## 2024-04-17 RX ORDER — POTASSIUM CHLORIDE 20 MEQ
20 PACKET (EA) ORAL ONCE
Refills: 0 | Status: COMPLETED | OUTPATIENT
Start: 2024-04-17 | End: 2024-04-17

## 2024-04-17 RX ORDER — CEFEPIME 1 G/1
INJECTION, POWDER, FOR SOLUTION INTRAMUSCULAR; INTRAVENOUS
Refills: 0 | Status: DISCONTINUED | OUTPATIENT
Start: 2024-04-17 | End: 2024-04-19

## 2024-04-17 RX ORDER — SENNA PLUS 8.6 MG/1
2 TABLET ORAL AT BEDTIME
Refills: 0 | Status: DISCONTINUED | OUTPATIENT
Start: 2024-04-17 | End: 2024-04-22

## 2024-04-17 RX ORDER — HEPARIN SODIUM 5000 [USP'U]/ML
5000 INJECTION INTRAVENOUS; SUBCUTANEOUS EVERY 8 HOURS
Refills: 0 | Status: DISCONTINUED | OUTPATIENT
Start: 2024-04-17 | End: 2024-04-19

## 2024-04-17 RX ORDER — CEFEPIME 1 G/1
1000 INJECTION, POWDER, FOR SOLUTION INTRAMUSCULAR; INTRAVENOUS EVERY 8 HOURS
Refills: 0 | Status: DISCONTINUED | OUTPATIENT
Start: 2024-04-17 | End: 2024-04-19

## 2024-04-17 RX ADMIN — CHLORHEXIDINE GLUCONATE 1 APPLICATION(S): 213 SOLUTION TOPICAL at 21:29

## 2024-04-17 RX ADMIN — HEPARIN SODIUM 5000 UNIT(S): 5000 INJECTION INTRAVENOUS; SUBCUTANEOUS at 13:41

## 2024-04-17 RX ADMIN — DOPAMINE HYDROCHLORIDE 11.1 MICROGRAM(S)/KG/MIN: 40 INJECTION, SOLUTION, CONCENTRATE INTRAVENOUS at 13:50

## 2024-04-17 RX ADMIN — Medication 3 MILLIGRAM(S): at 01:47

## 2024-04-17 RX ADMIN — DOPAMINE HYDROCHLORIDE 11.1 MICROGRAM(S)/KG/MIN: 40 INJECTION, SOLUTION, CONCENTRATE INTRAVENOUS at 21:42

## 2024-04-17 RX ADMIN — Medication 650 MILLIGRAM(S): at 01:25

## 2024-04-17 RX ADMIN — HEPARIN SODIUM 5000 UNIT(S): 5000 INJECTION INTRAVENOUS; SUBCUTANEOUS at 21:29

## 2024-04-17 RX ADMIN — PHENYLEPHRINE HYDROCHLORIDE 2.21 MICROGRAM(S)/KG/MIN: 10 INJECTION INTRAVENOUS at 16:26

## 2024-04-17 RX ADMIN — CEFEPIME 100 MILLIGRAM(S): 1 INJECTION, POWDER, FOR SOLUTION INTRAMUSCULAR; INTRAVENOUS at 14:36

## 2024-04-17 RX ADMIN — Medication 650 MILLIGRAM(S): at 13:15

## 2024-04-17 RX ADMIN — CEFEPIME 100 MILLIGRAM(S): 1 INJECTION, POWDER, FOR SOLUTION INTRAMUSCULAR; INTRAVENOUS at 01:25

## 2024-04-17 RX ADMIN — CEFEPIME 100 MILLIGRAM(S): 1 INJECTION, POWDER, FOR SOLUTION INTRAMUSCULAR; INTRAVENOUS at 21:29

## 2024-04-17 RX ADMIN — CHLORHEXIDINE GLUCONATE 1 APPLICATION(S): 213 SOLUTION TOPICAL at 06:15

## 2024-04-17 RX ADMIN — Medication 650 MILLIGRAM(S): at 13:37

## 2024-04-17 RX ADMIN — DOPAMINE HYDROCHLORIDE 11.1 MICROGRAM(S)/KG/MIN: 40 INJECTION, SOLUTION, CONCENTRATE INTRAVENOUS at 16:34

## 2024-04-17 RX ADMIN — Medication 20 MILLIEQUIVALENT(S): at 14:44

## 2024-04-17 RX ADMIN — HEPARIN SODIUM 5000 UNIT(S): 5000 INJECTION INTRAVENOUS; SUBCUTANEOUS at 06:15

## 2024-04-17 RX ADMIN — Medication 40 MILLIEQUIVALENT(S): at 03:30

## 2024-04-17 RX ADMIN — PHENYLEPHRINE HYDROCHLORIDE 2.21 MICROGRAM(S)/KG/MIN: 10 INJECTION INTRAVENOUS at 21:42

## 2024-04-17 NOTE — PROGRESS NOTE ADULT - ASSESSMENT
A/P:58F Hx hyperthyroidism (on methimazole) who presented to the ED w/ R-sided chest pain, cough, headache since Friday a/w chills and sweats and 1 episode of blood-tinged sputum.  Persistent hypotension despite 3L IVF in ED now requiring pressor support w/ Levophed. Telemetry w/ bradycardia to 50s w/ significant ectopy (bigemy, trigeminy), concerning for possible 2nd degree type II block.    Neuro:   Aox3, no active issues    Cardio:   #Sinus bradycardia with frequent PVCs, hypotension   - hypotension multifactorial 2/2 HB vs. hypothyroidism (2/2 methimazole) vs. sepsis  - unclear etiology, perhaps methimazole toxicity  - EP consulted, no need for TVP at this time  - start Dopamine, wean off Levo as able  - EP recs for PVC ablation  - Send TFTs   - Echo    Pulmonary:   RA, CTM    Renal:   No active issues    Endocrine:   #Hyperthyrodism on Methimazole  - recommend TSH, T4, T3  - rest as above  - endocrine consult in AM  - AM cortisol     ID:   #Fever and hypotension  RVP and UA negative  Recommend empiric coverage w/ Vanc/Zosyn  Blood/Ucx, MRSA swab    Prophylactic:   SQH Dvt ppx  NPO@MN in case of procedure tmrw  Pending clinical improvement   ======================= DISPOSITION  =====================  [X] Critical   [ ] Guarded    [ ] Stable    [X] Maintain in CICU  [ ] Downgrade to Telemtry  [ ] Discharge Home    Patient requires continuous monitoring with bedside rhythm monitoring, pulse ox monitoring, and intermittent blood gas analysis. Care plan discussed with ICU care team. Patient remained critical and at risk for life threatening decompensation.  Patient seen, examined and plan discussed with CCU team during rounds.     I have personally provided 75 minutes of critical care time excluding time spent on separate procedures, in addition to initial critical care time provided by the CICU Attending, Dr. Venkata Cantu [facility] [phone]    A/P:58F Bulgarian-speaking Hx hyperthyroidism (on methimazole) who presented to the ED w/ R-sided chest pain, cough, headache since Friday a/w chills and sweats and 1 episode of blood-tinged sputum.  Describes episodes of night sweats for weeks. Persistent hypotension despite 3L IVF in ED now requiring pressor support w/ Levophed--->dopamine. Telemetry w/ sinus bradycardia to 50s w/ significant ectopy (bigemy, trigeminy). Transferred to CICU for further care.     Neuro:   Aox3, no active issues    Cardio:   #Sinus bradycardia with frequent PVCs, hypotension   - hypotension multifactorial d/d includes medication-induced (methimazole?) vs. sepsis vs other etiology  - currently on dopamine gtt  - EP recs: no plan for PVC ablation 4/17  - TFTs wnl   - Echo with normal EF, official read pending  - f/u addnl serology including Lyme, ESR, CRP, blood/ucx    Pulmonary:   # R/o TB  airborne precautions for now  Not producing sputum currently, hx of fevers, night sweats, episode of hemoptysis   ID consulted for further recs    Renal:   No active issues    Endocrine:   #Hyperthyrodism on Methimazole  - TSH, T4, T3 wnl   - rest as above  - AM cortisol pending     ID:   #Fever and hypotension  RVP and UA negative, CT chest with dependent atelectasis but no apparent consolidation   Empiric coverage with cefepime   Blood/Ucx  - f/u addnl serology including Lyme, ESR, CRP, blood/ucx  - r/o TB airborne precautions  - ID consulted for further recs, will also consider non-infectious causes     Prophylactic:   SQH Dvt ppx  Diet: Regular   Pending clinical improvement   Lines: PIVs    A/P:58F Estonian-speaking Hx hyperthyroidism (on methimazole) who presented to the ED w/ R-sided chest pain, cough, headache since Friday a/w chills and sweats and 1 episode of blood-tinged sputum.  Describes episodes of night sweats for weeks. Persistent hypotension despite 3L IVF in ED now requiring pressor support w/ Levophed--->dopamine. Telemetry w/ sinus bradycardia to 50s w/ significant ectopy (bigemy, trigeminy). Transferred to CICU for further care.     Neuro:   Aox3, no active issues    Cardio:   #Sinus bradycardia with frequent PVCs, hypotension   - hypotension multifactorial d/d includes medication-induced (methimazole?) vs. sepsis vs other etiology  - currently on dopamine gtt  - TFTs wnl   - Echo with normal EF, official read pending  - f/u addnl serology including Lyme, ESR, CRP, blood/ucx    Pulmonary:   # R/o TB  airborne precautions for now  Not producing sputum currently, hx of fevers, night sweats, episode of hemoptysis   ID consulted for further recs    Renal:   No active issues    Endocrine:   #Hyperthyrodism on Methimazole  - TSH, T4, T3 wnl   - rest as above  - AM cortisol pending     ID:   #Fever and hypotension  RVP and UA negative, CT chest with dependent atelectasis but no apparent consolidation   Empiric coverage with cefepime   Blood/Ucx  - f/u addnl serology including Lyme, ESR, CRP, blood/ucx  - r/o TB airborne precautions  - ID consulted for further recs, will also consider non-infectious causes     Prophylactic:   SQH Dvt ppx  Diet: Regular   Pending clinical improvement   Lines: PIVs

## 2024-04-17 NOTE — CONSULT NOTE ADULT - SUBJECTIVE AND OBJECTIVE BOX
HISTORY OF PRESENT ILLNESS:      Allergies    No Known Allergies    Intolerances    	    MEDICATIONS:  DOPamine Infusion 5 MICROgram(s)/kG/Min IV Continuous <Continuous>  heparin   Injectable 5000 Unit(s) SubCutaneous every 8 hours    cefepime   IVPB 1000 milliGRAM(s) IV Intermittent every 12 hours  cefepime   IVPB          acetaminophen     Tablet .. 650 milliGRAM(s) Oral once        chlorhexidine 2% Cloths 1 Application(s) Topical <User Schedule>      PAST MEDICAL & SURGICAL HISTORY:  Hyperthyroidism          FAMILY HISTORY:      SOCIAL HISTORY:    [ ] Non-smoker  [ ] Smoker  [ ] Alcohol      REVIEW OF SYSTEMS:  See HPI. Otherwise, 12 point ROS done and otherwise negative.    PHYSICAL EXAM:  T(C): 36.6 (04-17-24 @ 11:45), Max: 36.9 (04-16-24 @ 16:48)  HR: 59 (04-17-24 @ 12:30) (47 - 106)  BP: 101/49 (04-17-24 @ 12:30) (59/32 - 162/72)  RR: 22 (04-17-24 @ 12:30) (12 - 25)  SpO2: 95% (04-17-24 @ 12:30) (89% - 100%)  Wt(kg): --  I&O's Summary    16 Apr 2024 07:01  -  17 Apr 2024 07:00  --------------------------------------------------------  IN: 337.6 mL / OUT: 1550 mL / NET: -1212.4 mL    17 Apr 2024 07:01  -  17 Apr 2024 12:42  --------------------------------------------------------  IN: 295.5 mL / OUT: 500 mL / NET: -204.5 mL        Appearance: Normal	  HEENT:   Normal oral mucosa, PERRL, EOMI	  Lymphatic: No lymphadenopathy  Cardiovascular: Normal S1 S2, No JVD, No murmurs, No edema  Respiratory: Lungs clear to auscultation	  Psychiatry: A & O x 3, Mood & affect appropriate  Gastrointestinal:  Soft, Non-tender, + BS	  Skin: No rashes, No ecchymoses, No cyanosis	  Neurologic: Non-focal  Extremities: Normal range of motion, No clubbing, cyanosis or edema  Vascular: Peripheral pulses palpable 2+ bilaterally        LABS:	 	    CBC Full  -  ( 17 Apr 2024 02:05 )  WBC Count : 5.05 K/uL  Hemoglobin : 13.3 g/dL  Hematocrit : 40.9 %  Platelet Count - Automated : 154 K/uL  Mean Cell Volume : 96.0 fl  Mean Cell Hemoglobin : 31.2 pg  Mean Cell Hemoglobin Concentration : 32.5 gm/dL  Auto Neutrophil # : 2.57 K/uL  Auto Lymphocyte # : 1.80 K/uL  Auto Monocyte # : 0.45 K/uL  Auto Eosinophil # : 0.16 K/uL  Auto Basophil # : 0.03 K/uL  Auto Neutrophil % : 50.9 %  Auto Lymphocyte % : 35.6 %  Auto Monocyte % : 8.9 %  Auto Eosinophil % : 3.2 %  Auto Basophil % : 0.6 %    04-17    139  |  102  |  10  ----------------------------<  136<H>  3.7   |  26  |  0.58  04-16    138  |  103  |  10  ----------------------------<  105<H>  4.2   |  22  |  0.68    Ca    9.1      17 Apr 2024 02:04  Ca    8.8      16 Apr 2024 10:40  Phos  3.5     04-17  Phos  2.9     04-16  Mg     2.0     04-17  Mg     2.0     04-17    TPro  6.8  /  Alb  4.0  /  TBili  0.3  /  DBili  x   /  AST  18  /  ALT  10  /  AlkPhos  82  04-17  TPro  6.4  /  Alb  3.9  /  TBili  0.3  /  DBili  x   /  AST  17  /  ALT  10  /  AlkPhos  71  04-16      proBNP:   Lipid Profile:   HgA1c:   TSH: Thyroid Stimulating Hormone, Serum: 2.43 uIU/mL (04-16 @ 14:05)        CARDIAC MARKERS:                TELEMETRY: 	    ECG:  	  RADIOLOGY:  OTHER: 	    PREVIOUS DIAGNOSTIC TESTING:    [ ] Echocardiogram:  [ ]  Catheterization:  [ ] Stress Test:  	  	  ASSESSMENT/PLAN: 	     HISTORY OF PRESENT ILLNESS:  58-year-old female with a past medical history of hyperthyroidism (on methimazole) presenting with right-sided chest pain, cough, headache since Friday. Patient also complaining of chills and sweats. Patient had 1 episode of hemoptysis 2 days ago. associated left-sided headache. Patients coworker had COVID last week. EKG in the ED revealed sinus bradycardia with frequent PVCs. Febrile to 101.2 on admission. RVP and UA negative, CTA negative for PE. Also reports taking Methimazole at home without consistent appts or TSH being checked with question of methimazole toxicity. Hypotensive to 80/40 on admission despite 2L of IVF so started on Levophed. EP consulted and said no need for TVP at this time. Admitted to CICU for further workup.  EP consulted for further eval.   Translation per daughter by bedside.   Patient was told in the past she had an "irregular HR" by her doctor in [place] but states was not told she had to take any medications or need procedures for it, denies any hx of AF/AFL/low HR per patient. States BP is usually low at home ~100-50s. Currently asymptomatic with PVCs. Denies any palpitations or "skipped beats" but had intermittent right sided chest pain/cough/headache since Friday.       Allergies    No Known Allergies    Intolerances    	    MEDICATIONS:  DOPamine Infusion 5 MICROgram(s)/kG/Min IV Continuous <Continuous>  heparin   Injectable 5000 Unit(s) SubCutaneous every 8 hours  cefepime   IVPB 1000 milliGRAM(s) IV Intermittent every 12 hours  cefepime   IVPB      acetaminophen     Tablet .. 650 milliGRAM(s) Oral once  chlorhexidine 2% Cloths 1 Application(s) Topical <User Schedule>    PAST MEDICAL & SURGICAL HISTORY:  Hyperthyroidism    FAMILY HISTORY:  Lung cancer: Mother    SOCIAL HISTORY:    Tobacco: 1 pack every 3 days  1 - 8 oz cocktail daily  Denies drug use    REVIEW OF SYSTEMS:  See HPI. Otherwise, 12 point ROS done and otherwise negative.    PHYSICAL EXAM:  T(C): 36.6 (04-17-24 @ 11:45), Max: 36.9 (04-16-24 @ 16:48)  HR: 59 (04-17-24 @ 12:30) (47 - 106)  BP: 101/49 (04-17-24 @ 12:30) (59/32 - 162/72)  RR: 22 (04-17-24 @ 12:30) (12 - 25)  SpO2: 95% (04-17-24 @ 12:30) (89% - 100%)  Wt(kg): --  I&O's Summary    16 Apr 2024 07:01  -  17 Apr 2024 07:00  --------------------------------------------------------  IN: 337.6 mL / OUT: 1550 mL / NET: -1212.4 mL    17 Apr 2024 07:01  -  17 Apr 2024 12:42  --------------------------------------------------------  IN: 295.5 mL / OUT: 500 mL / NET: -204.5 mL      Appearance: Normal	  HEENT: NC/AT  Cardiovascular: Normal S1 S2  Respiratory: Lungs clear to auscultation	  Psychiatry: A & O x 3, Mood & affect appropriate  Neurologic: Non-focal  Extremities: No BLE edema    LABS:	 	    CBC Full  -  ( 17 Apr 2024 02:05 )  WBC Count : 5.05 K/uL  Hemoglobin : 13.3 g/dL  Hematocrit : 40.9 %  Platelet Count - Automated : 154 K/uL  Mean Cell Volume : 96.0 fl  Mean Cell Hemoglobin : 31.2 pg  Mean Cell Hemoglobin Concentration : 32.5 gm/dL  Auto Neutrophil # : 2.57 K/uL  Auto Lymphocyte # : 1.80 K/uL  Auto Monocyte # : 0.45 K/uL  Auto Eosinophil # : 0.16 K/uL  Auto Basophil # : 0.03 K/uL  Auto Neutrophil % : 50.9 %  Auto Lymphocyte % : 35.6 %  Auto Monocyte % : 8.9 %  Auto Eosinophil % : 3.2 %  Auto Basophil % : 0.6 %    04-17    139  |  102  |  10  ----------------------------<  136<H>  3.7   |  26  |  0.58  04-16    138  |  103  |  10  ----------------------------<  105<H>  4.2   |  22  |  0.68    Ca    9.1      17 Apr 2024 02:04  Ca    8.8      16 Apr 2024 10:40  Phos  3.5     04-17  Phos  2.9     04-16  Mg     2.0     04-17  Mg     2.0     04-17    TPro  6.8  /  Alb  4.0  /  TBili  0.3  /  DBili  x   /  AST  18  /  ALT  10  /  AlkPhos  82  04-17  TPro  6.4  /  Alb  3.9  /  TBili  0.3  /  DBili  x   /  AST  17  /  ALT  10  /  AlkPhos  71  04-16      proBNP: Pro-Brain Natriuretic Peptide (04.16.24 @ 14:05)    Pro-Brain Natriuretic Peptide: 236 pg/mL    TSH: Thyroid Stimulating Hormone, Serum: 2.43 uIU/mL (04-16 @ 14:05)    CARDIAC MARKERS: Troponin T, High Sensitivity (04.16.24 @ 14:05)    Troponin T, High Sensitivity Result: <6: *  *  Rapid upward or downward changes in high-sensitivity troponin levels  suggest acute myocardial injury. Renal impairment may cause sustained  troponin elevations.  Normal: <6 - 14 ng/L  Indeterminate: 15-51 ng/L  Elevated: > 51 ng/L  See [email or web address]/test/[email or web address] on the [facility] intranet for more  information ng/L    RADIOLOGY:  	    PREVIOUS DIAGNOSTIC TESTING:    [ ] Echocardiogram:  [ ]  Catheterization:  [ ] Stress Test:  	  	  ASSESSMENT/PLAN: 	     HISTORY OF PRESENT ILLNESS:  58-year-old female with a past medical history of hyperthyroidism (on methimazole) presenting with right-sided chest pain, cough, headache since Friday. Patient also complaining of chills and sweats. Patient had 1 episode of hemoptysis 2 days ago. associated left-sided headache. Patients coworker had COVID last week. EKG in the ED revealed sinus bradycardia with frequent PVCs. Febrile to 101.2 on admission. RVP and UA negative, CTA negative for PE. Also reports taking Methimazole at home without consistent appts or TSH being checked with question of methimazole toxicity. Hypotensive to 80/40 on admission despite 2L of IVF so started on Levophed. EP consulted and said no need for TVP at this time. Admitted to CICU for further workup.  EP consulted for further eval.   Translation per daughter by bedside.   Patient was told in the past she had an "irregular HR" by her doctor in [place] but states was not told she had to take any medications or need procedures for it, denies any hx of AF/AFL/low HR per patient. States BP is usually low at home ~100-50s. Currently asymptomatic with PVCs. Denies any palpitations or "skipped beats" but had intermittent right sided chest pain/cough/headache since Friday. Pt takes her Methimazole 3 times/week, has not checked her thyroid function in >1 year.     Allergies  No Known Allergies  Intolerances      MEDICATIONS:  DOPamine Infusion 5 MICROgram(s)/kG/Min IV Continuous <Continuous>  heparin   Injectable 5000 Unit(s) SubCutaneous every 8 hours  cefepime   IVPB 1000 milliGRAM(s) IV Intermittent every 12 hours  cefepime   IVPB      acetaminophen     Tablet .. 650 milliGRAM(s) Oral once  chlorhexidine 2% Cloths 1 Application(s) Topical <User Schedule>    PAST MEDICAL & SURGICAL HISTORY:  Hyperthyroidism    FAMILY HISTORY:  Lung cancer: Mother    SOCIAL HISTORY:    Tobacco: 1 pack every 3 days  1 - 8 oz cocktail daily  Denies drug use    REVIEW OF SYSTEMS:  See HPI. Otherwise, 12 point ROS done and otherwise negative.    PHYSICAL EXAM:  T(C): 36.6 (04-17-24 @ 11:45), Max: 36.9 (04-16-24 @ 16:48)  HR: 59 (04-17-24 @ 12:30) (47 - 106)  BP: 101/49 (04-17-24 @ 12:30) (59/32 - 162/72)  RR: 22 (04-17-24 @ 12:30) (12 - 25)  SpO2: 95% (04-17-24 @ 12:30) (89% - 100%)  Wt(kg): --  I&O's Summary    16 Apr 2024 07:01  -  17 Apr 2024 07:00  --------------------------------------------------------  IN: 337.6 mL / OUT: 1550 mL / NET: -1212.4 mL    17 Apr 2024 07:01  -  17 Apr 2024 12:42  --------------------------------------------------------  IN: 295.5 mL / OUT: 500 mL / NET: -204.5 mL      Appearance: Normal	  HEENT: NC/AT  Cardiovascular: Normal S1 S2  Respiratory: Lungs clear to auscultation	  Psychiatry: A & O x 3, Mood & affect appropriate  Neurologic: Non-focal  Extremities: No BLE edema    LABS:	 	    CBC Full  -  ( 17 Apr 2024 02:05 )  WBC Count : 5.05 K/uL  Hemoglobin : 13.3 g/dL  Hematocrit : 40.9 %  Platelet Count - Automated : 154 K/uL  Mean Cell Volume : 96.0 fl  Mean Cell Hemoglobin : 31.2 pg  Mean Cell Hemoglobin Concentration : 32.5 gm/dL  Auto Neutrophil # : 2.57 K/uL  Auto Lymphocyte # : 1.80 K/uL  Auto Monocyte # : 0.45 K/uL  Auto Eosinophil # : 0.16 K/uL  Auto Basophil # : 0.03 K/uL  Auto Neutrophil % : 50.9 %  Auto Lymphocyte % : 35.6 %  Auto Monocyte % : 8.9 %  Auto Eosinophil % : 3.2 %  Auto Basophil % : 0.6 %    04-17    139  |  102  |  10  ----------------------------<  136<H>  3.7   |  26  |  0.58  04-16    138  |  103  |  10  ----------------------------<  105<H>  4.2   |  22  |  0.68    Ca    9.1      17 Apr 2024 02:04  Ca    8.8      16 Apr 2024 10:40  Phos  3.5     04-17  Phos  2.9     04-16  Mg     2.0     04-17  Mg     2.0     04-17    TPro  6.8  /  Alb  4.0  /  TBili  0.3  /  DBili  x   /  AST  18  /  ALT  10  /  AlkPhos  82  04-17  TPro  6.4  /  Alb  3.9  /  TBili  0.3  /  DBili  x   /  AST  17  /  ALT  10  /  AlkPhos  71  04-16      proBNP: Pro-Brain Natriuretic Peptide (04.16.24 @ 14:05)    Pro-Brain Natriuretic Peptide: 236 pg/mL    TSH: Thyroid Stimulating Hormone, Serum: 2.43 uIU/mL (04-16 @ 14:05)    CARDIAC MARKERS: Troponin T, High Sensitivity (04.16.24 @ 14:05)    Troponin T, High Sensitivity Result: <6: *  *  Rapid upward or downward changes in high-sensitivity troponin levels  suggest acute myocardial injury. Renal impairment may cause sustained  troponin elevations.  Normal: <6 - 14 ng/L  Indeterminate: 15-51 ng/L  Elevated: > 51 ng/L  See [email or web address]/test/[email or web address] on the [facility] intranet for more  information ng/L    RADIOLOGY:  	< from: CT Angio Chest PE Protocol w/ IV Cont (04.16.24 @ 14:33) >  FINDINGS:    LUNGS AND AIRWAYS: Patent central airways.  Dependent atelectasis.  PLEURA: No pleural effusion.  MEDIASTINUM AND ALANA: No lymphadenopathy.  VESSELS: No pulmonary embolus  HEART: Heart size is normal. No pericardial effusion.  CHEST WALL AND LOWER NECK: Bilateral breast implants  VISUALIZED UPPER ABDOMEN: Within normal limits.  BONES: Within normal limits.    IMPRESSION:  No pulmonary embolus      < end of copied text >      PREVIOUS DIAGNOSTIC TESTING:    [ ] Echocardiogram: < from: TTE W or WO Ultrasound Enhancing Agent (04.17.24 @ 08:25) >  CONCLUSIONS:      1. Left ventricular cavity is mildly dilated. Left ventricular wall thickness is normal. Abnormal (paradoxical) septal motion consistent with conduction delay. Left ventricular systolic function is normal with an ejection fraction of 63 % by Lema's method of disks. There are no regional wall motion abnormalities seen.   2. Entire inferior wall and basal and mid inferior septum are abnormal.   3. Normal left ventricular diastolic function, with normal filling pressure.   4. Normal right ventricular cavity size and normal systolic function.   5. Normal left and right atrial size.   6. No significant valvular disease.   7. Estimated pulmonary artery systolic pressure is 16 mmHg.   8. No pericardial effusion seen.   9. No prior echocardiogram is available for comparison.    ________________________________________________________________________________________  FINDINGS:     Left Ventricle:  The left ventricular cavity is mildly dilated. Left ventricular wall thickness is normal. Abnormal (paradoxical) septal motion consistent with conduction delay. Left ventricular systolic function is normal with a calculated ejection fraction of 63 % by the Lema's biplane method of disks. There are no regional wall motion abnormalities seen. There is normal left ventricular diastolic function, with normal filling pressure. No left ventricular hypertrophy.  LV Wall Scoring: The entire inferior wall and basal and mid inferior septum are  hypokinetic. All remaining scored segments are normal.          Right Ventricle:  The right ventricular cavity is normal in size and normal systolic function. Tricuspid annular plane systolic excursion (TAPSE) is 1.8 cm (normal >=1.7 cm).     Left Atrium:  The left atrium is normal in size with an indexed volume of 22.78 ml/m².     Right Atrium:  The right atrium is normal in size.     Interatrial Septum:  The interatrial septum appears intact.     Aortic Valve:  The aortic valve appears trileaflet with normal systolic excursion. There is no aortic valve stenosis. There is no evidence of aortic regurgitation.     Mitral Valve:  Structurally normal mitral valve with normal leaflet excursion. There is normal leaflet mobility of the mitral valve. There is no mitral valve stenosis. There is trace mitral regurgitation.     Tricuspid Valve:  Structurally normal tricuspid valve with normal leaflet excursion. There is trace tricuspid regurgitation. Estimated pulmonary artery systolic pressure is 16 mmHg.     Pulmonic Valve:  Structurally normal pulmonic valve with normal leaflet excursion. There is trace pulmonic regurgitation.     Aorta:  The proximal aorta is not well visualized. The aortic root appears normal in size. The aortic root at the sinuses of Valsalva is normal in size, measuring 3.10 cm (indexed 1.89 cm/m²).     Pericardium:  No pericardial effusion seen.     Systemic Veins:  The inferior vena cava is normal in size (normal <2.1cm) with normal inspiratory collapse (normal >50%) consistent with normal right atrial pressure (~3, range 0-5mmHg).    < end of copied text >   HISTORY OF PRESENT ILLNESS:  58-year-old female with a past medical history of hyperthyroidism (on methimazole) presenting with right-sided chest pain, cough, headache since Friday. Patient also complaining of chills and sweats. Patient had 1 episode of hemoptysis 2 days ago. associated left-sided headache. Patients coworker had COVID last week. EKG in the ED revealed sinus bradycardia with frequent PVCs. Febrile to 101.2 on admission. RVP and UA negative, CTA negative for PE. Also reports taking Methimazole at home without consistent appts or TSH being checked with question of methimazole toxicity. Hypotensive to 80/40 on admission despite 2L of IVF so started on Levophed. EP consulted and said no need for TVP at this time. Admitted to CICU for further workup.  EP consulted for further eval.   Translation per daughter by bedside.   Patient was told in the past she had an "irregular HR" by her doctor in [place] but states was not told she had to take any medications or need procedures for it, denies any hx of AF/AFL/low HR per patient. States BP is usually low at home ~100/50s. Currently asymptomatic with PVCs. Denies any palpitations or "skipped beats" but had intermittent right sided chest pain/cough/headache since Friday. Pt takes her Methimazole 3 times/week, has not checked her thyroid function in >1 year.     Allergies  No Known Allergies  Intolerances      MEDICATIONS:  DOPamine Infusion 5 MICROgram(s)/kG/Min IV Continuous <Continuous>  heparin   Injectable 5000 Unit(s) SubCutaneous every 8 hours  cefepime   IVPB 1000 milliGRAM(s) IV Intermittent every 12 hours  cefepime   IVPB      acetaminophen     Tablet .. 650 milliGRAM(s) Oral once  chlorhexidine 2% Cloths 1 Application(s) Topical <User Schedule>    PAST MEDICAL & SURGICAL HISTORY:  Hyperthyroidism    FAMILY HISTORY:  Lung cancer: Mother    SOCIAL HISTORY:    Tobacco: 1 pack every 3 days  1 - 8 oz cocktail daily  Denies drug use    REVIEW OF SYSTEMS:  See HPI. Otherwise, 12 point ROS done and otherwise negative.    PHYSICAL EXAM:  T(C): 36.6 (04-17-24 @ 11:45), Max: 36.9 (04-16-24 @ 16:48)  HR: 59 (04-17-24 @ 12:30) (47 - 106)  BP: 101/49 (04-17-24 @ 12:30) (59/32 - 162/72)  RR: 22 (04-17-24 @ 12:30) (12 - 25)  SpO2: 95% (04-17-24 @ 12:30) (89% - 100%)  Wt(kg): --  I&O's Summary    16 Apr 2024 07:01  -  17 Apr 2024 07:00  --------------------------------------------------------  IN: 337.6 mL / OUT: 1550 mL / NET: -1212.4 mL    17 Apr 2024 07:01  -  17 Apr 2024 12:42  --------------------------------------------------------  IN: 295.5 mL / OUT: 500 mL / NET: -204.5 mL      Appearance: Normal	  HEENT: NC/AT  Cardiovascular: Normal S1 S2  Respiratory: Lungs clear to auscultation	  Psychiatry: A & O x 3, Mood & affect appropriate  Neurologic: Non-focal  Extremities: No BLE edema    LABS:	 	    CBC Full  -  ( 17 Apr 2024 02:05 )  WBC Count : 5.05 K/uL  Hemoglobin : 13.3 g/dL  Hematocrit : 40.9 %  Platelet Count - Automated : 154 K/uL  Mean Cell Volume : 96.0 fl  Mean Cell Hemoglobin : 31.2 pg  Mean Cell Hemoglobin Concentration : 32.5 gm/dL  Auto Neutrophil # : 2.57 K/uL  Auto Lymphocyte # : 1.80 K/uL  Auto Monocyte # : 0.45 K/uL  Auto Eosinophil # : 0.16 K/uL  Auto Basophil # : 0.03 K/uL  Auto Neutrophil % : 50.9 %  Auto Lymphocyte % : 35.6 %  Auto Monocyte % : 8.9 %  Auto Eosinophil % : 3.2 %  Auto Basophil % : 0.6 %    04-17    139  |  102  |  10  ----------------------------<  136<H>  3.7   |  26  |  0.58  04-16    138  |  103  |  10  ----------------------------<  105<H>  4.2   |  22  |  0.68    Ca    9.1      17 Apr 2024 02:04  Ca    8.8      16 Apr 2024 10:40  Phos  3.5     04-17  Phos  2.9     04-16  Mg     2.0     04-17  Mg     2.0     04-17    TPro  6.8  /  Alb  4.0  /  TBili  0.3  /  DBili  x   /  AST  18  /  ALT  10  /  AlkPhos  82  04-17  TPro  6.4  /  Alb  3.9  /  TBili  0.3  /  DBili  x   /  AST  17  /  ALT  10  /  AlkPhos  71  04-16      proBNP: Pro-Brain Natriuretic Peptide (04.16.24 @ 14:05)    Pro-Brain Natriuretic Peptide: 236 pg/mL    TSH: Thyroid Stimulating Hormone, Serum: 2.43 uIU/mL (04-16 @ 14:05)    CARDIAC MARKERS: Troponin T, High Sensitivity (04.16.24 @ 14:05)    Troponin T, High Sensitivity Result: <6: *  *  Rapid upward or downward changes in high-sensitivity troponin levels  suggest acute myocardial injury. Renal impairment may cause sustained  troponin elevations.  Normal: <6 - 14 ng/L  Indeterminate: 15-51 ng/L  Elevated: > 51 ng/L  See [email or web address]/test/[email or web address] on the [facility] intranet for more  information ng/L    RADIOLOGY:  	< from: CT Angio Chest PE Protocol w/ IV Cont (04.16.24 @ 14:33) >  FINDINGS:    LUNGS AND AIRWAYS: Patent central airways.  Dependent atelectasis.  PLEURA: No pleural effusion.  MEDIASTINUM AND ALANA: No lymphadenopathy.  VESSELS: No pulmonary embolus  HEART: Heart size is normal. No pericardial effusion.  CHEST WALL AND LOWER NECK: Bilateral breast implants  VISUALIZED UPPER ABDOMEN: Within normal limits.  BONES: Within normal limits.    IMPRESSION:  No pulmonary embolus      < end of copied text >      PREVIOUS DIAGNOSTIC TESTING:    [ ] Echocardiogram: < from: TTE W or WO Ultrasound Enhancing Agent (04.17.24 @ 08:25) >  CONCLUSIONS:      1. Left ventricular cavity is mildly dilated. Left ventricular wall thickness is normal. Abnormal (paradoxical) septal motion consistent with conduction delay. Left ventricular systolic function is normal with an ejection fraction of 63 % by Lema's method of disks. There are no regional wall motion abnormalities seen.   2. Entire inferior wall and basal and mid inferior septum are abnormal.   3. Normal left ventricular diastolic function, with normal filling pressure.   4. Normal right ventricular cavity size and normal systolic function.   5. Normal left and right atrial size.   6. No significant valvular disease.   7. Estimated pulmonary artery systolic pressure is 16 mmHg.   8. No pericardial effusion seen.   9. No prior echocardiogram is available for comparison.    ________________________________________________________________________________________  FINDINGS:     Left Ventricle:  The left ventricular cavity is mildly dilated. Left ventricular wall thickness is normal. Abnormal (paradoxical) septal motion consistent with conduction delay. Left ventricular systolic function is normal with a calculated ejection fraction of 63 % by the Lema's biplane method of disks. There are no regional wall motion abnormalities seen. There is normal left ventricular diastolic function, with normal filling pressure. No left ventricular hypertrophy.  LV Wall Scoring: The entire inferior wall and basal and mid inferior septum are  hypokinetic. All remaining scored segments are normal.          Right Ventricle:  The right ventricular cavity is normal in size and normal systolic function. Tricuspid annular plane systolic excursion (TAPSE) is 1.8 cm (normal >=1.7 cm).     Left Atrium:  The left atrium is normal in size with an indexed volume of 22.78 ml/m².     Right Atrium:  The right atrium is normal in size.     Interatrial Septum:  The interatrial septum appears intact.     Aortic Valve:  The aortic valve appears trileaflet with normal systolic excursion. There is no aortic valve stenosis. There is no evidence of aortic regurgitation.     Mitral Valve:  Structurally normal mitral valve with normal leaflet excursion. There is normal leaflet mobility of the mitral valve. There is no mitral valve stenosis. There is trace mitral regurgitation.     Tricuspid Valve:  Structurally normal tricuspid valve with normal leaflet excursion. There is trace tricuspid regurgitation. Estimated pulmonary artery systolic pressure is 16 mmHg.     Pulmonic Valve:  Structurally normal pulmonic valve with normal leaflet excursion. There is trace pulmonic regurgitation.     Aorta:  The proximal aorta is not well visualized. The aortic root appears normal in size. The aortic root at the sinuses of Valsalva is normal in size, measuring 3.10 cm (indexed 1.89 cm/m²).     Pericardium:  No pericardial effusion seen.     Systemic Veins:  The inferior vena cava is normal in size (normal <2.1cm) with normal inspiratory collapse (normal >50%) consistent with normal right atrial pressure (~3, range 0-5mmHg).    < end of copied text >

## 2024-04-17 NOTE — PROGRESS NOTE ADULT - SUBJECTIVE AND OBJECTIVE BOX
Patient is a 58y old  Female who presents with a chief complaint of Bradycardia (2024 19:11)    HPI:  58-year-old female with a past medical history of hyperthyroidism on methimazole presenting with right-sided chest pain, cough, headache since Friday. Patient also complaining of chills and sweats. Patient had 1 episode of hemoptysis 2 days ago. associated left-sided headache. Patients coworker had COVID last week. EKG in the ED revealed sinus bradycardia with frequent PVCs. Febrile to 101.2 on admission. RVP and UA negative, CTA negative for PE. Also reports taking Methimazole at home without consistent appts or TSH being checked with question of methimazole toxicity. Hypotensive to 80/40 on admission despite 2L of IVF so started on Levophed. EP consulted and said no need for TVP at this time. Admitted to CICU for further workup. (2024 19:11)       INTERVAL HPI/OVERNIGHT EVENTS:   No overnight events   Afebrile, hemodynamically stable     Subjective:    ICU Vital Signs Last 24 Hrs  T(C): 36.7 (2024 03:00), Max: 38.4 (2024 10:17)  T(F): 98 (2024 03:00), Max: 101.2 (2024 10:17)  HR: 50 (2024 06:45) (35 - 106)  BP: 97/52 (2024 06:45) (59/32 - 162/72)  BP(mean): 70 (2024 06:45) (41 - 103)  ABP: --  ABP(mean): --  RR: 18 (2024 06:45) (14 - 25)  SpO2: 96% (2024 06:45) (89% - 100%)    O2 Parameters below as of 2024 06:00  Patient On (Oxygen Delivery Method): room air          I&O's Summary    2024 07:01  -  2024 07:00  --------------------------------------------------------  IN: 337.6 mL / OUT: 1550 mL / NET: -1212.4 mL          Daily Height in cm: 167.64 (2024 09:57)    Daily Weight in k.6 (2024 02:00)    Adult Advanced Hemodynamics Last 24 Hrs  CVP(mm Hg): --  CVP(cm H2O): --  CO: --  CI: --  PA: --  PA(mean): --  PCWP: --  SVR: --  SVRI: --  PVR: --  PVRI: --    EKG/Telemetry Events:    MEDICATIONS  (STANDING):  cefepime   IVPB      cefepime   IVPB 1000 milliGRAM(s) IV Intermittent every 12 hours  chlorhexidine 2% Cloths 1 Application(s) Topical <User Schedule>  DOPamine Infusion 5 MICROgram(s)/kG/Min (11.1 mL/Hr) IV Continuous <Continuous>  heparin   Injectable 5000 Unit(s) SubCutaneous every 8 hours    MEDICATIONS  (PRN):      PHYSICAL EXAM:  GENERAL:   HEAD:  Atraumatic, Normocephalic  EYES: EOMI, PERRLA, conjunctiva and sclera clear  NECK: Supple, No JVD, Normal thyroid, no enlarged nodes  NERVOUS SYSTEM:  Alert & Awake.   CHEST/LUNG: B/L good air entry; No rales, rhonchi, or wheezing  HEART: S1S2 normal, no S3, Regular rate and rhythm; No murmurs  ABDOMEN: Soft, Nontender, Nondistended; Bowel sounds present  EXTREMITIES:  2+ Peripheral Pulses, No clubbing, cyanosis, or edema  LYMPH: No lymphadenopathy noted  SKIN: No rashes or lesions    LABS:                        13.3   5.05  )-----------( 154      ( 2024 02:05 )             40.9     04-17    139  |  102  |  10  ----------------------------<  136<H>  3.7   |  26  |  0.58    Ca    9.1      2024 02:04  Phos  3.5     04-17  Mg     2.0     -    TPro  6.8  /  Alb  4.0  /  TBili  0.3  /  DBili  x   /  AST  18  /  ALT  10  /  AlkPhos  82  04-17    LIVER FUNCTIONS - ( 2024 02:04 )  Alb: 4.0 g/dL / Pro: 6.8 g/dL / ALK PHOS: 82 U/L / ALT: 10 U/L / AST: 18 U/L / GGT: x           PT/INR - ( 2024 02:05 )   PT: 9.4 sec;   INR: 0.89 ratio         PTT - ( 2024 02:05 )  PTT:27.9 sec  CAPILLARY BLOOD GLUCOSE      POCT Blood Glucose.: 98 mg/dL (2024 12:52)            Urinalysis Basic - ( 2024 02:04 )    Color: x / Appearance: x / SG: x / pH: x  Gluc: 136 mg/dL / Ketone: x  / Bili: x / Urobili: x   Blood: x / Protein: x / Nitrite: x   Leuk Esterase: x / RBC: x / WBC x   Sq Epi: x / Non Sq Epi: x / Bacteria: x          RADIOLOGY & ADDITIONAL TESTS:  CXR:        Care Discussed with Consultants/Other Providers [ x] YES  [ ] NO           Patient is a 58y old  Female who presents with a chief complaint of Bradycardia (2024 19:11)    HPI:  58-year-old female with a past medical history of hyperthyroidism on methimazole presenting with right-sided chest pain, cough, headache since Friday. Patient also complaining of chills and sweats. Patient had 1 episode of hemoptysis 2 days ago. associated left-sided headache. Patients coworker had COVID last week. EKG in the ED revealed sinus bradycardia with frequent PVCs. Febrile to 101.2 on admission. RVP and UA negative, CTA negative for PE. Also reports taking Methimazole at home without consistent appts or TSH being checked with question of methimazole toxicity. Hypotensive to 80/40 on admission despite 2L of IVF so started on Levophed. EP consulted and said no need for TVP at this time. Admitted to CICU for further workup. (2024 19:11)       INTERVAL HPI/OVERNIGHT EVENTS:   Switched from Levophed to dopamine. Afebrile ON. Daughter at bedside provided translation per patient request. She denied cp, sob, dizziness, syncope, or lightheadness.     Subjective:    ICU Vital Signs Last 24 Hrs  T(C): 36.7 (2024 03:00), Max: 38.4 (2024 10:17)  T(F): 98 (2024 03:00), Max: 101.2 (2024 10:17)  HR: 50 (2024 06:45) (35 - 106)  BP: 97/52 (2024 06:45) (59/32 - 162/72)  BP(mean): 70 (2024 06:45) (41 - 103)  ABP: --  ABP(mean): --  RR: 18 (2024 06:45) (14 - 25)  SpO2: 96% (2024 06:45) (89% - 100%)    O2 Parameters below as of 2024 06:00  Patient On (Oxygen Delivery Method): room air          I&O's Summary    2024 07:01  -  2024 07:00  --------------------------------------------------------  IN: 337.6 mL / OUT: 1550 mL / NET: -1212.4 mL          Daily Height in cm: 167.64 (2024 09:57)    Daily Weight in k.6 (2024 02:00)    Adult Advanced Hemodynamics Last 24 Hrs  CVP(mm Hg): --  CVP(cm H2O): --  CO: --  CI: --  PA: --  PA(mean): --  PCWP: --  SVR: --  SVRI: --  PVR: --  PVRI: --    EKG/Telemetry Events:    MEDICATIONS  (STANDING):  cefepime   IVPB      cefepime   IVPB 1000 milliGRAM(s) IV Intermittent every 12 hours  chlorhexidine 2% Cloths 1 Application(s) Topical <User Schedule>  DOPamine Infusion 5 MICROgram(s)/kG/Min (11.1 mL/Hr) IV Continuous <Continuous>  heparin   Injectable 5000 Unit(s) SubCutaneous every 8 hours    MEDICATIONS  (PRN):      PHYSICAL EXAM:  GENERAL: No acute distress, well-developed  HEAD:  Atraumatic, Normocephalic  EYES: EOMI, PERRLA, conjunctiva and sclera clear  CHEST/LUNG: CTAB; No wheezes, rales, or rhonchi  HEART: Regular rate and rhythm. Normal S1/S2. No murmurs, rubs, or gallops  ABDOMEN: Soft, non-tender, non-distended; normal bowel sounds, no organomegaly  EXTREMITIES:  2+ peripheral pulses b/l, No clubbing, cyanosis, or edema  NEUROLOGY: A&O x 3, no focal deficits  SKIN: No rashes or lesions    LABS:                        13.3   5.05  )-----------( 154      ( 2024 02:05 )             40.9     04-17    139  |  102  |  10  ----------------------------<  136<H>  3.7   |  26  |  0.58    Ca    9.1      2024 02:04  Phos  3.5     04-17  Mg     2.0     04-17    TPro  6.8  /  Alb  4.0  /  TBili  0.3  /  DBili  x   /  AST  18  /  ALT  10  /  AlkPhos  82  04-17    LIVER FUNCTIONS - ( 2024 02:04 )  Alb: 4.0 g/dL / Pro: 6.8 g/dL / ALK PHOS: 82 U/L / ALT: 10 U/L / AST: 18 U/L / GGT: x           PT/INR - ( 2024 02:05 )   PT: 9.4 sec;   INR: 0.89 ratio         PTT - ( 2024 02:05 )  PTT:27.9 sec  CAPILLARY BLOOD GLUCOSE      POCT Blood Glucose.: 98 mg/dL (2024 12:52)            Urinalysis Basic - ( 2024 02:04 )    Color: x / Appearance: x / SG: x / pH: x  Gluc: 136 mg/dL / Ketone: x  / Bili: x / Urobili: x   Blood: x / Protein: x / Nitrite: x   Leuk Esterase: x / RBC: x / WBC x   Sq Epi: x / Non Sq Epi: x / Bacteria: x          RADIOLOGY & ADDITIONAL TESTS:  CXR:        Care Discussed with Consultants/Other Providers [ x] YES  [ ] NO           Patient is a 58y old  Female who presents with a chief complaint of Bradycardia (2024 19:11)    HPI:  58-year-old female with a past medical history of hyperthyroidism on methimazole presenting with right-sided chest pain, cough, headache since Friday. Patient also complaining of chills and sweats. Patient had 1 episode of hemoptysis 2 days ago. associated left-sided headache. Patients coworker had COVID last week. EKG in the ED revealed sinus bradycardia with frequent PVCs. Febrile to 101.2 on admission. RVP and UA negative, CTA negative for PE. Also reports taking Methimazole at home without consistent appts or TSH being checked with question of methimazole toxicity. Hypotensive to 80/40 on admission despite 2L of IVF so started on Levophed. EP consulted and said no need for TVP at this time. Admitted to CICU for further workup. (2024 19:11)       INTERVAL HPI/OVERNIGHT EVENTS:   Switched from Levophed to dopamine. Afebrile ON. Daughter at bedside provided translation per patient request. She denied cp, sob, dizziness, syncope, or lightheadness.     Subjective:    ICU Vital Signs Last 24 Hrs  T(C): 36.7 (2024 03:00), Max: 38.4 (2024 10:17)  T(F): 98 (2024 03:00), Max: 101.2 (2024 10:17)  HR: 50 (2024 06:45) (35 - 106)  BP: 97/52 (2024 06:45) (59/32 - 162/72)  BP(mean): 70 (2024 06:45) (41 - 103)  ABP: --  ABP(mean): --  RR: 18 (2024 06:45) (14 - 25)  SpO2: 96% (2024 06:45) (89% - 100%)    O2 Parameters below as of 2024 06:00  Patient On (Oxygen Delivery Method): room air          I&O's Summary    2024 07:01  -  2024 07:00  --------------------------------------------------------  IN: 337.6 mL / OUT: 1550 mL / NET: -1212.4 mL          Daily Height in cm: 167.64 (2024 09:57)    Daily Weight in k.6 (2024 02:00)    Adult Advanced Hemodynamics Last 24 Hrs  CVP(mm Hg): --  CVP(cm H2O): --  CO: --  CI: --  PA: --  PA(mean): --  PCWP: --  SVR: --  SVRI: --  PVR: --  PVRI: --    EKG/Telemetry Events: Sinus bradycardia, high PVC burden, frequent bigem and trigeminy     MEDICATIONS  (STANDING):  cefepime   IVPB      cefepime   IVPB 1000 milliGRAM(s) IV Intermittent every 12 hours  chlorhexidine 2% Cloths 1 Application(s) Topical <User Schedule>  DOPamine Infusion 5 MICROgram(s)/kG/Min (11.1 mL/Hr) IV Continuous <Continuous>  heparin   Injectable 5000 Unit(s) SubCutaneous every 8 hours    MEDICATIONS  (PRN):      PHYSICAL EXAM:  GENERAL: No acute distress, well-developed  HEAD:  Atraumatic, Normocephalic  EYES: EOMI, PERRLA, conjunctiva and sclera clear  CHEST/LUNG: CTAB; No wheezes, rales, or rhonchi  HEART: Regular rate and rhythm. Normal S1/S2. No murmurs, rubs, or gallops  ABDOMEN: Soft, non-tender, non-distended; normal bowel sounds, no organomegaly  EXTREMITIES:  2+ peripheral pulses b/l, No clubbing, cyanosis, or edema  NEUROLOGY: A&O x 3, no focal deficits  SKIN: No rashes or lesions    LABS:                        13.3   5.05  )-----------( 154      ( 2024 02:05 )             40.9     -    139  |  102  |  10  ----------------------------<  136<H>  3.7   |  26  |  0.58    Ca    9.1      2024 02:04  Phos  3.5     04-17  Mg     2.0     -17    TPro  6.8  /  Alb  4.0  /  TBili  0.3  /  DBili  x   /  AST  18  /  ALT  10  /  AlkPhos  82  04-17    LIVER FUNCTIONS - ( 2024 02:04 )  Alb: 4.0 g/dL / Pro: 6.8 g/dL / ALK PHOS: 82 U/L / ALT: 10 U/L / AST: 18 U/L / GGT: x           PT/INR - ( 2024 02:05 )   PT: 9.4 sec;   INR: 0.89 ratio         PTT - ( 2024 02:05 )  PTT:27.9 sec  CAPILLARY BLOOD GLUCOSE      POCT Blood Glucose.: 98 mg/dL (2024 12:52)            Urinalysis Basic - ( 2024 02:04 )    Color: x / Appearance: x / SG: x / pH: x  Gluc: 136 mg/dL / Ketone: x  / Bili: x / Urobili: x   Blood: x / Protein: x / Nitrite: x   Leuk Esterase: x / RBC: x / WBC x   Sq Epi: x / Non Sq Epi: x / Bacteria: x          RADIOLOGY & ADDITIONAL TESTS:  CXR:        Care Discussed with Consultants/Other Providers [ x] YES  [ ] NO           Patient is a 58y old  Female who presents with a chief complaint of Bradycardia (2024 19:11)    HPI:  58-year-old female with a past medical history of hyperthyroidism on methimazole presenting with right-sided chest pain, cough, headache since Friday. Patient also complaining of chills and sweats. Patient had 1 episode of hemoptysis 2 days ago. associated left-sided headache. Patients coworker had COVID last week. EKG in the ED revealed sinus bradycardia with frequent PVCs. Febrile to 101.2 on admission. RVP and UA negative, CTA negative for PE. Also reports taking Methimazole at home without consistent appts or TSH being checked with question of methimazole toxicity. Hypotensive to 80/40 on admission despite 2L of IVF so started on Levophed. EP consulted and said no need for TVP at this time. Admitted to CICU for further workup. (2024 19:11)       INTERVAL HPI/OVERNIGHT EVENTS:   Switched from Levophed to dopamine. Afebrile ON. Daughter at bedside provided translation per patient request. She denied cp, sob, dizziness, syncope, or lightheadness.     Subjective:    ICU Vital Signs Last 24 Hrs  T(C): 36.7 (2024 03:00), Max: 38.4 (2024 10:17)  T(F): 98 (2024 03:00), Max: 101.2 (2024 10:17)  HR: 50 (2024 06:45) (35 - 106)  BP: 97/52 (2024 06:45) (59/32 - 162/72)  BP(mean): 70 (2024 06:45) (41 - 103)  ABP: --  ABP(mean): --  RR: 18 (2024 06:45) (14 - 25)  SpO2: 96% (2024 06:45) (89% - 100%)    O2 Parameters below as of 2024 06:00  Patient On (Oxygen Delivery Method): room air          I&O's Summary    2024 07:01  -  2024 07:00  --------------------------------------------------------  IN: 337.6 mL / OUT: 1550 mL / NET: -1212.4 mL          Daily Height in cm: 167.64 (2024 09:57)    Daily Weight in k.6 (2024 02:00)    Adult Advanced Hemodynamics Last 24 Hrs  CVP(mm Hg): --  CVP(cm H2O): --  CO: --  CI: --  PA: --  PA(mean): --  PCWP: --  SVR: --  SVRI: --  PVR: --  PVRI: --    EKG/Telemetry Events: Sinus bradycardia, high PVC burden, frequent bigem and trigeminy     MEDICATIONS  (STANDING):  cefepime   IVPB      cefepime   IVPB 1000 milliGRAM(s) IV Intermittent every 12 hours  chlorhexidine 2% Cloths 1 Application(s) Topical <User Schedule>  DOPamine Infusion 5 MICROgram(s)/kG/Min (11.1 mL/Hr) IV Continuous <Continuous>  heparin   Injectable 5000 Unit(s) SubCutaneous every 8 hours    MEDICATIONS  (PRN):      PHYSICAL EXAM:  GENERAL: No acute distress, well-developed  HEAD:  Atraumatic, Normocephalic  EYES: EOMI, PERRLA  CHEST/LUNG: CTAB; No wheezes, rales, or rhonchi  HEART: osiel, no murmurs appreciated   ABDOMEN: Soft, non-tender, non-distended; normal bowel sounds  EXTREMITIES:  2+ peripheral pulses b/l, No clubbing, cyanosis, or edema  NEUROLOGY: A&O x 3, no focal deficits    LABS:                        13.3   5.05  )-----------( 154      ( 2024 02:05 )             40.9     04-17    139  |  102  |  10  ----------------------------<  136<H>  3.7   |  26  |  0.58    Ca    9.1      2024 02:04  Phos  3.5     04-17  Mg     2.0     04-17    TPro  6.8  /  Alb  4.0  /  TBili  0.3  /  DBili  x   /  AST  18  /  ALT  10  /  AlkPhos  82  04-17    LIVER FUNCTIONS - ( 2024 02:04 )  Alb: 4.0 g/dL / Pro: 6.8 g/dL / ALK PHOS: 82 U/L / ALT: 10 U/L / AST: 18 U/L / GGT: x           PT/INR - ( 2024 02:05 )   PT: 9.4 sec;   INR: 0.89 ratio         PTT - ( 2024 02:05 )  PTT:27.9 sec  CAPILLARY BLOOD GLUCOSE      POCT Blood Glucose.: 98 mg/dL (2024 12:52)            Urinalysis Basic - ( 2024 02:04 )    Color: x / Appearance: x / SG: x / pH: x  Gluc: 136 mg/dL / Ketone: x  / Bili: x / Urobili: x   Blood: x / Protein: x / Nitrite: x   Leuk Esterase: x / RBC: x / WBC x   Sq Epi: x / Non Sq Epi: x / Bacteria: x          RADIOLOGY & ADDITIONAL TESTS:  CXR:        Care Discussed with Consultants/Other Providers [ x] YES  [ ] NO

## 2024-04-17 NOTE — PROGRESS NOTE ADULT - SUBJECTIVE AND OBJECTIVE BOX
CURT GURROLA  MRN-[id number]  Patient is a 58y old  Female who presents with a chief complaint of Bradycardia (17 Apr 2024 14:46)    HPI:  58-year-old female with a past medical history of hyperthyroidism on methimazole presenting with right-sided chest pain, cough, headache since Friday. Patient also complaining of chills and sweats. Patient had 1 episode of hemoptysis 2 days ago. associated left-sided headache. Patients coworker had COVID last week. EKG in the ED revealed sinus bradycardia with frequent PVCs. Febrile to 101.2 on admission. RVP and UA negative, CTA negative for PE. Also reports taking Methimazole at home without consistent appts or TSH being checked with question of methimazole toxicity. Hypotensive to 80/40 on admission despite 2L of IVF so started on Levophed. EP consulted and said no need for TVP at this time. Admitted to CICU for further workup. (16 Apr 2024 19:11)      24 HOUR EVENTS:    REVIEW OF SYSTEMS:    CONSTITUTIONAL: No weakness, fevers or chills  EYES/ENT: No visual changes;  No vertigo or throat pain   NECK: No pain or stiffness  RESPIRATORY: No cough, wheezing, hemoptysis; No shortness of breath  CARDIOVASCULAR: No chest pain or palpitations  GASTROINTESTINAL: No abdominal or epigastric pain. No nausea, vomiting, or hematemesis; No diarrhea or constipation. No melena or hematochezia.  GENITOURINARY: No dysuria, frequency or hematuria  NEUROLOGICAL: No numbness or weakness  SKIN: No itching, rashes      ICU Vital Signs Last 24 Hrs  T(C): 36.8 (17 Apr 2024 16:00), Max: 36.9 (16 Apr 2024 23:00)  T(F): 98.2 (17 Apr 2024 16:00), Max: 98.4 (16 Apr 2024 23:00)  HR: 47 (17 Apr 2024 19:00) (44 - 106)  BP: 104/51 (17 Apr 2024 19:00) (59/32 - 162/72)  BP(mean): 74 (17 Apr 2024 19:00) (41 - 103)  ABP: --  ABP(mean): --  RR: 19 (17 Apr 2024 19:00) (12 - 25)  SpO2: 98% (17 Apr 2024 19:00) (89% - 100%)    O2 Parameters below as of 17 Apr 2024 19:00  Patient On (Oxygen Delivery Method): room air            CVP(mm Hg): --  CO: --  CI: --  PA: --  PA(mean): --  PA(direct): --  PCWP: --  LA: --  RA: --  SVR: --  SVRI: --  PVR: --  PVRI: --  I&O's Summary    16 Apr 2024 07:01  -  17 Apr 2024 07:00  --------------------------------------------------------  IN: 337.6 mL / OUT: 1550 mL / NET: -1212.4 mL    17 Apr 2024 07:01  -  17 Apr 2024 19:33  --------------------------------------------------------  IN: 738.7 mL / OUT: 1250 mL / NET: -511.3 mL        CAPILLARY BLOOD GLUCOSE    CAPILLARY BLOOD GLUCOSE      POCT Blood Glucose.: 98 mg/dL (16 Apr 2024 12:52)      PHYSICAL EXAM:  GENERAL: No acute distress, well-developed  HEAD:  Atraumatic, Normocephalic  EYES: EOMI, PERRLA, conjunctiva and sclera clear  NECK: Supple, no lymphadenopathy, no JVD  CHEST/LUNG: CTAB; No wheezes, rales, or rhonchi  HEART: Regular rate and rhythm. Normal S1/S2. No murmurs, rubs, or gallops  ABDOMEN: Soft, non-tender, non-distended; normal bowel sounds, no organomegaly  EXTREMITIES:  2+ peripheral pulses b/l, No clubbing, cyanosis, or edema  NEUROLOGY: A&O x 3, no focal deficits  SKIN: No rashes or lesions    ============================I/O===========================   I&O's Detail    16 Apr 2024 07:01  -  17 Apr 2024 07:00  --------------------------------------------------------  IN:    DOPamine Infusion: 133.2 mL    IV PiggyBack: 50 mL    Norepinephrine: 4.4 mL    Oral Fluid: 150 mL  Total IN: 337.6 mL    OUT:    Voided (mL): 1550 mL  Total OUT: 1550 mL    Total NET: -1212.4 mL      17 Apr 2024 07:01  -  17 Apr 2024 19:33  --------------------------------------------------------  IN:    DOPamine Infusion: 99.9 mL    DOPamine Infusion: 22.2 mL    IV PiggyBack: 50 mL    Oral Fluid: 540 mL    Phenylephrine: 26.6 mL  Total IN: 738.7 mL    OUT:    Voided (mL): 1250 mL  Total OUT: 1250 mL    Total NET: -511.3 mL        ============================ LABS =========================                        13.3   5.05  )-----------( 154      ( 17 Apr 2024 02:05 )             40.9     04-17    138  |  101  |  9   ----------------------------<  133<H>  3.8   |  23  |  0.69    Ca    9.6      17 Apr 2024 13:10  Phos  3.5     04-17  Mg     2.0     04-17    TPro  6.8  /  Alb  4.0  /  TBili  0.3  /  DBili  x   /  AST  17  /  ALT  10  /  AlkPhos  79  04-17    Troponin T, High Sensitivity Result: <6 ng/L (04-16-24 @ 14:05)              LIVER FUNCTIONS - ( 17 Apr 2024 13:10 )  Alb: 4.0 g/dL / Pro: 6.8 g/dL / ALK PHOS: 79 U/L / ALT: 10 U/L / AST: 17 U/L / GGT: x           PT/INR - ( 17 Apr 2024 02:05 )   PT: 9.4 sec;   INR: 0.89 ratio         PTT - ( 17 Apr 2024 02:05 )  PTT:27.9 sec    Blood Gas Venous - Lactate: 1.6 mmol/L (04-16-24 @ 10:40)    Urinalysis Basic - ( 17 Apr 2024 13:10 )    Color: x / Appearance: x / SG: x / pH: x  Gluc: 133 mg/dL / Ketone: x  / Bili: x / Urobili: x   Blood: x / Protein: x / Nitrite: x   Leuk Esterase: x / RBC: x / WBC x   Sq Epi: x / Non Sq Epi: x / Bacteria: x      ======================Micro/Rad/Cardio=================  Telemetry: Reviewed   EKG: Reviewed  CXR: Reviewed  Culture: Reviewed   Echo:   Cath:   ======================================================  PAST MEDICAL & SURGICAL HISTORY:  Hyperthyroidism              ======================= LINES/TUBES  =====================  [place]: (Date placed)  Central Line: (Date placed)  HD Catheter: (Date placed)  Arterial Line: (Date placed)  Endotracheal Tube: (Date placed)  [place]: (Date placed)  ======================= DISPOSITION  =====================  [X] Critical   [ ] Guarded    [ ] Stable    [X] Maintain in CICU  [ ] Downgrade to Telemtry  [ ] Discharge Home    Patient requires continuous monitoring with bedside rhythm monitoring, pulse ox monitoring, and intermittent blood gas analysis. Care plan discussed with ICU care team. Patient remained critical and at risk for life threatening decompensation.  Patient seen, examined and plan discussed with CCU team during rounds.     I have personally provided 30 minutes of critical care time excluding time spent on separate procedures, in addition to initial critical care time provided by the CICU Attending, Dr. Hall/ Venkata/ Rustam/ Winifred/ Jaya/ [facility] .       [facility] [phone]      CURT GURROLA  MRN-[id number]  Patient is a 58y old  Female who presents with a chief complaint of Bradycardia (17 Apr 2024 14:46)    HPI: 58-year-old female with a past medical history of hyperthyroidism on methimazole presenting with right-sided chest pain, cough, headache since Friday. Patient also complaining of chills and sweats. Patient had 1 episode of hemoptysis 2 days ago. associated left-sided headache. Patients coworker had COVID last week. EKG in the ED revealed sinus bradycardia with frequent PVCs. Febrile to 101.2 on admission. RVP and UA negative, CTA negative for PE. Also reports taking Methimazole at home without consistent appts or TSH being checked with question of methimazole toxicity. Hypotensive to 80/40 on admission despite 2L of IVF so started on Levophed. EP consulted and said no need for TVP at this time. Admitted to CICU for further workup. (16 Apr 2024 19:11)    REVIEW OF SYSTEMS:  CONSTITUTIONAL: No weakness, fevers or chills  EYES/ENT: No visual changes;  No vertigo or throat pain   NECK: No pain or stiffness  RESPIRATORY: No cough, wheezing, hemoptysis; No shortness of breath  CARDIOVASCULAR: No chest pain or palpitations  GASTROINTESTINAL: No abdominal or epigastric pain. No nausea, vomiting, or hematemesis; No diarrhea or constipation. No melena or hematochezia.  GENITOURINARY: No dysuria, frequency or hematuria  NEUROLOGICAL: No numbness or weakness  SKIN: No itching, rashes    ICU Vital Signs Last 24 Hrs  T(C): 36.8 (17 Apr 2024 16:00), Max: 36.9 (16 Apr 2024 23:00)  T(F): 98.2 (17 Apr 2024 16:00), Max: 98.4 (16 Apr 2024 23:00)  HR: 47 (17 Apr 2024 19:00) (44 - 106)  BP: 104/51 (17 Apr 2024 19:00) (59/32 - 162/72)  BP(mean): 74 (17 Apr 2024 19:00) (41 - 103)  RR: 19 (17 Apr 2024 19:00) (12 - 25)  SpO2: 98% (17 Apr 2024 19:00) (89% - 100%)    O2 Parameters below as of 17 Apr 2024 19:00  Patient On (Oxygen Delivery Method): room air      I&O's Summary    16 Apr 2024 07:01  -  17 Apr 2024 07:00  --------------------------------------------------------  IN: 337.6 mL / OUT: 1550 mL / NET: -1212.4 mL    17 Apr 2024 07:01  -  17 Apr 2024 19:33  --------------------------------------------------------  IN: 738.7 mL / OUT: 1250 mL / NET: -511.3 mL      CAPILLARY BLOOD GLUCOSE  POCT Blood Glucose.: 98 mg/dL (16 Apr 2024 12:52)  ============================I/O===========================   I&O's Detail    16 Apr 2024 07:01  -  17 Apr 2024 07:00  --------------------------------------------------------  IN:    DOPamine Infusion: 133.2 mL    IV PiggyBack: 50 mL    Norepinephrine: 4.4 mL    Oral Fluid: 150 mL  Total IN: 337.6 mL    OUT:    Voided (mL): 1550 mL  Total OUT: 1550 mL    Total NET: -1212.4 mL      17 Apr 2024 07:01  -  17 Apr 2024 19:33  --------------------------------------------------------  IN:    DOPamine Infusion: 99.9 mL    DOPamine Infusion: 22.2 mL    IV PiggyBack: 50 mL    Oral Fluid: 540 mL    Phenylephrine: 26.6 mL  Total IN: 738.7 mL    OUT:    Voided (mL): 1250 mL  Total OUT: 1250 mL    Total NET: -511.3 mL  ============================ LABS =========================                    13.3   5.05  )-----------( 154      ( 17 Apr 2024 02:05 )             40.9     04-17    138  |  101  |  9   ----------------------------<  133<H>  3.8   |  23  |  0.69    Ca    9.6      17 Apr 2024 13:10  Phos  3.5     04-17  Mg     2.0     04-17    TPro  6.8  /  Alb  4.0  /  TBili  0.3  /  DBili  x   /  AST  17  /  ALT  10  /  AlkPhos  79  04-17    Troponin T, High Sensitivity Result: <6 ng/L (04-16-24 @ 14:05)    LIVER FUNCTIONS - ( 17 Apr 2024 13:10 )  Alb: 4.0 g/dL / Pro: 6.8 g/dL / ALK PHOS: 79 U/L / ALT: 10 U/L / AST: 17 U/L / GGT: x           PT/INR - ( 17 Apr 2024 02:05 )   PT: 9.4 sec;   INR: 0.89 ratio    PTT - ( 17 Apr 2024 02:05 )  PTT:27.9 sec    Blood Gas Venous - Lactate: 1.6 mmol/L (04-16-24 @ 10:40)    Urinalysis Basic - ( 17 Apr 2024 13:10 )    Color: x / Appearance: x / SG: x / pH: x  Gluc: 133 mg/dL / Ketone: x  / Bili: x / Urobili: x   Blood: x / Protein: x / Nitrite: x   Leuk Esterase: x / RBC: x / WBC x   Sq Epi: x / Non Sq Epi: x / Bacteria: x  ======================Micro/Rad/Cardio=================  Telemetry: Reviewed   EKG: Reviewed  CXR: Reviewed  Culture: Reviewed   Echo: Reviewed   Cath: Reviewed   ======================================================  PAST MEDICAL & SURGICAL HISTORY:  Hyperthyroidism    A/P: A/P:58F Pashto-speaking Hx hyperthyroidism (on methimazole) who presented to the ED w/ R-sided chest pain, cough, headache since Friday a/w chills and sweats and 1 episode of blood-tinged sputum.  Describes episodes of night sweats for weeks. Persistent hypotension despite 3L IVF in ED now requiring pressor support w/ Levophed--->dopamine. Telemetry w/ sinus bradycardia to 50s w/ significant ectopy (bigemy, trigeminy). Transferred to CICU for further care.     Neuro:   Aox3, no active issues    Cardio:   #Sinus bradycardia with frequent PVCs, hypotension   - hypotension multifactorial d/d includes medication-induced (methimazole?) vs. sepsis vs other etiology  - currently on dopamine gtt  - TFTs wnl   - Echo with normal EF, official read pending  - f/u addnl serology including Lyme, ESR, CRP, blood/ucx    Pulmonary:   # R/o TB  airborne precautions for now  Not producing sputum currently, hx of fevers, night sweats, episode of hemoptysis   ID consulted for further recs    Renal:   No active issues    Endocrine:   #Hyperthyrodism on Methimazole  - TSH, T4, T3 wnl   - rest as above  - AM cortisol pending     ID:   #Fever and hypotension  RVP and UA negative, CT chest with dependent atelectasis but no apparent consolidation   Empiric coverage with cefepime   Blood/Ucx  - f/u addnl serology including Lyme, ESR, CRP, blood/ucx  - r/o TB airborne precautions  - ID consulted for further recs, will also consider non-infectious causes     Prophylactic:   SQH Dvt ppx  Diet: Regular   Pending clinical improvement   Lines: PIVs   ======================= DISPOSITION  =====================  [X] Critical   [ ] Guarded    [ ] Stable    [X] Maintain in CICU  [ ] Downgrade to Telemetry  [ ] Discharge Home    Patient requires continuous monitoring with bedside rhythm monitoring, pulse ox monitoring, and intermittent blood gas analysis. Care plan discussed with ICU care team. Patient remained critical and at risk for life threatening decompensation. Patient seen, examined and plan discussed with CCU team during rounds.     I have personally provided 30 minutes of critical care time excluding time spent on separate procedures, in addition to initial critical care time provided by the CICU Attending, Dr. [phone]

## 2024-04-17 NOTE — CONSULT NOTE ADULT - ASSESSMENT
58F Hungarian-speaking Hx hyperthyroidism (on methimazole) who presented to the ED w/ R-sided chest pain, cough, headache since Friday a/w chills and sweats and 1 episode of blood-tinged sputum.  Describes episodes of night sweats for weeks. Persistent hypotension despite 3L IVF in ED now requiring pressor support w/ Levophed--->dopamine. Telemetry w/ sinus bradycardia to 50s w/ significant ectopy (bigemy, trigeminy). Transferred to CICU for further care.     #Hyperthyroidism on Methimazole  #Sepsis workup  #Frequent PVCs    - tele with SR rates ~45-60s with intermittent PVCs in bigem/trigem. BP noted ~90-100s/50-60s, low BP noted without ectopy on tele, very unlikely d/t PVCs. Asymptomatic with ectopy.   - Currently on Dopamine @ 5, wean as able  - Would trial Midodrine for low BP.   - Pt with temp of 101 in ED, WBC normal and labs thus far unremarkable. COVID/FLU (-). ID on board, appreciate recs. Pending Blood cx. Currently on Cefepime and Pip/daisha  - TFTs thus far WNL.   - TTE pending  - No EP intervention at this time. Workup ongoing  - Keep on tele. Keep K>4, Mg>2  - Discussed with EP attending and primary team.  58F Belarusian-speaking Hx hyperthyroidism (on methimazole) who presented to the ED w/ R-sided chest pain, cough, headache since Friday a/w chills and sweats and 1 episode of blood-tinged sputum.  Describes episodes of night sweats for weeks. Persistent hypotension despite 3L IVF in ED now requiring pressor support w/ Levophed--->dopamine. Telemetry w/ sinus bradycardia to 50s w/ significant ectopy (bigemy, trigeminy). Transferred to CICU for further care.     #Hyperthyroidism on Methimazole  #Sepsis workup  #Frequent PVCs    - tele with SR rates ~45-60s with intermittent PVCs in bigem/trigem. BP noted ~90-100s/50-60s, low BP noted without ectopy on tele, very unlikely d/t PVCs. Asymptomatic with ectopy.   - Currently on Dopamine @ 5, wean as able  - Would trial Midodrine for low BP.   - Pt with temp of 101 in ED, WBC normal and labs thus far unremarkable. COVID/FLU (-). ID on board, appreciate recs. Pending Blood cx. Currently on Cefepime and Pip/daisha  - TFTs thus far WNL. Endo following, appreciate recs.   - TTE with LVEF 63%, no RWMA, LV wall thickness normal, LV cavity mildly dilated, no pericardial effusion  - No EP intervention at this time. Workup ongoing  - Keep on tele. Keep K>4, Mg>2  - EP will sign off, please reconsult with any questions.   - Discussed with EP attending and primary team.

## 2024-04-17 NOTE — CONSULT NOTE ADULT - ATTENDING COMMENTS
Patient seen and examined. Case discussed with team.   57 yo woman w hyperthyroidism (on methimazole) admitted with bradycardia and hypotension requiring NE. No electrolyte abnormalities on BMP. TFTs are pending. Patient will be admitted to CCU for further monitoring.
58-year-old female with a past medical history of hyperthyroidism on methimazole presenting with right-sided chest pain, cough, headache since Friday. Patient also complaining of chills and sweats. Patient had 1 episode of hemoptysis 2 days ago. associated left-sided headache. Patients coworker had COVID last week. EKG in the ED revealed sinus bradycardia with frequent PVCs. Febrile to 101.2 on admission. RVP and UA negative, CTA negative for PE.  Hypotensive to 80/40 on admission despite 2L of IVF so started on Levophed. Admitted to CICU for further workup.     ED t max 101.2, No leucocytosis, labs unremarkable. started on vancomycin and Zosyn    Hospital course ; Afebrile, abx switched to cefepime. Being ruled out for TB. On dopamine.  UA 4/16 unremarkable  Limited RVP 4/16 Negative  Bcx 4/16 NGTD  MRSA PCR collected  QuantiFeron collected     Procal 0.05  CRP 30      CTA; No PE, dependent atelectasis  Limited ECHO; no pericardia, pleural effusion       # Fevers   # Cough/ trace blood in sputum  # Hyperthyroidism    - Unclear source of fevers  - Check CT head and CT sinus  - Would check HIV, ESR, LONG, RF, Ferritin  - Check full RVP, Legionella urine Ag  - Low risk for TB;  r/o TB with AFB X3, Follow up QuantiFeron  - Continue cefepime for now; would switch to 1 gm Q8h      Roxana Riley M.D. ,   please reach via teams   If no answer, or after 5PM/ weekends,  then please call  [phone]    Assessment and plan discussed with the primary team .
Patient is a 58-year-old male with history of Graves' disease, on methimazole, likely taking methimazole 5 mg every other day from home (different medication from [place]); presenting with sinus bradycardia with frequent ectopy, admitted to the CICU, currently on pressors.  Endocrinology consulted for management of Graves' disease.  TFT showing normal TSH level of 2.43, normal total T4 level was 7.6 and total T3 level 83.  Check TSH receptor antibody and TSI, if they are negative, consider tapering off or discontinue methimazole and repeating TFT in 1 to 2 months if still elevated.  Given TSH and given thyroid functions are within normal limits with patient not taking methimazole for about 1 to 2 weeks prior to admission, will hold off restarting methimazole currently.  Would be helpful to confirm the dosage of methimazole that she was taking from home.  Obtain thyroid ultrasound to rule out thyroid nodules.  Schedule appointment with [facility] at [address] [address].

## 2024-04-17 NOTE — CONSULT NOTE ADULT - SUBJECTIVE AND OBJECTIVE BOX
NOTE INCOMPLETE/ IN PROGRESS  *Please wait for attending attestation for official recommendations.     HPI:  58-year-old female with a past medical history of hyperthyroidism on methimazole presenting with right-sided chest pain, cough, headache since Friday. Patient also complaining of chills and sweats. Patient had 1 episode of hemoptysis 2 days ago. associated left-sided headache. Patients coworker had COVID last week. EKG in the ED revealed sinus bradycardia with frequent PVCs. Febrile to 101.2 on admission. RVP and UA negative, CTA negative for PE. Also reports taking Methimazole at home without consistent appts or TSH being checked with question of methimazole toxicity. Hypotensive to 80/40 on admission despite 2L of IVF so started on Levophed. EP consulted and said no need for TVP at this time. Admitted to CICU for further workup. (16 Apr 2024 19:11)    Consulted for: Hx of hyperthyroidism on methimazole    Serbian translation: self (native Serbian speaker)    Endocrine history:  Diagnosed with Graves disease in 2018 but patient thinks she probably had this years before but was undiagnosed prior. 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  Started on methimazole at high dose initially and then subsequently doses were decreased over the years. Denies hx of ZHANG.   She moved to [place] in 2019 but has been going to [place] once a year and sees all her doctors in [place] at that time. She does not see any doctors or gets labs done in the [place]  She cannot recall the exact dose of methimazole but reports it is prescribed as every other day. She reports that once her doctor trialed her off methimazole completely but she had recurrent tremors so was restarted on methimazole every other day. She admits to poor adherence to methimazole dosing; she takes in 2x/week rather than every other day. She denies any tremors, heart racing, anxiety, diarrhea, heat intolerance.    She admits to smoking; she changed from cigarette smoking to electronic cigarettes about 10 days ago.        PAST MEDICAL & SURGICAL HISTORY:  Hyperthyroidism          FAMILY HISTORY:  Denies any known family hx of thyroid disorders    Social History:  +smoking -> now electronic cigarettes    Outpatient Medications:  Methimazole ?dose 2x/week (prescribed as every other day)      MEDICATIONS  (STANDING):  cefepime   IVPB      cefepime   IVPB 1000 milliGRAM(s) IV Intermittent every 8 hours  chlorhexidine 2% Cloths 1 Application(s) Topical <User Schedule>  DOPamine Infusion 5 MICROgram(s)/kG/Min (11.1 mL/Hr) IV Continuous <Continuous>  heparin   Injectable 5000 Unit(s) SubCutaneous every 8 hours    MEDICATIONS  (PRN):      Allergies    No Known Allergies    Intolerances      Review of Systems:  Constitutional: + fever  Eyes: No blurry vision. + proptosis  Neuro: No tremors  HEENT: No pain  Cardiovascular: + chest pain. no palpitations  Respiratory: + cough, hemoptysis  GI: No nausea, vomiting, diarrhea, abdominal pain  : No dysuria  Skin: no rash  Psych: no depression  Endocrine: no polyuria, polydipsia  Hem/lymph: no swelling  Osteoporosis: no fractures    ALL OTHER SYSTEMS REVIEWED AND NEGATIVE      PHYSICAL EXAM:  VITALS: T(C): 36.6 (04-17-24 @ 11:45)  T(F): 97.8 (04-17-24 @ 11:45), Max: 98.5 (04-16-24 @ 16:48)  HR: 68 (04-17-24 @ 14:30) (47 - 106)  BP: 91/46 (04-17-24 @ 14:30) (59/32 - 162/72)  RR:  (12 - 25)  SpO2:  (89% - 100%)  Wt(kg): --  GENERAL: NAD, well-groomed, well-developed  EYES: mild proptosis.  no lid lag, anicteric  HEENT:  Atraumatic, Normocephalic, moist mucous membranes  THYROID: Normal size, no palpable nodules  RESPIRATORY: no respiratory distress; No wheezing  CARDIOVASCULAR: regular rate. No murmurs; no peripheral edema  GI: Soft, nontender, non distended  SKIN: Dry, intact, No rashes or lesions  MUSCULOSKELETAL: LOPEZ  NEURO: sensation intact, extraocular movements intact, no tremor  PSYCH: Alert and oriented x 3, normal affect, normal mood  CUSHING'S SIGNS: no striae      CAPILLARY BLOOD GLUCOSE                                13.3   5.05  )-----------( 154      ( 17 Apr 2024 02:05 )             40.9       04-17    138  |  101  |  9   ----------------------------<  133<H>  3.8   |  23  |  0.69    eGFR: 99    Ca    9.6      04-17  Mg     2.0     04-17  Phos  3.5     04-17    TPro  6.8  /  Alb  4.0  /  TBili  0.3  /  DBili  x   /  AST  17  /  ALT  10  /  AlkPhos  79  04-17      Thyroid Function Tests:  04-17 @ 02:05 TSH -- FreeT4 -- T3 83 Anti TPO -- Anti Thyroglobulin Ab -- TSI --  04-16 @ 14:05 TSH 2.43 FreeT4 -- T3 -- Anti TPO -- Anti Thyroglobulin Ab -- TSI --              Radiology:              NOTE INCOMPLETE/ IN PROGRESS  *Please wait for attending attestation for official recommendations.     HPI:  58-year-old female with a past medical history of hyperthyroidism on methimazole presenting with right-sided chest pain, cough, headache since Friday. Patient also complaining of chills and sweats. Patient had 1 episode of hemoptysis 2 days ago. associated left-sided headache. Patients coworker had COVID last week. EKG in the ED revealed sinus bradycardia with frequent PVCs. Febrile to 101.2 on admission. RVP and UA negative, CTA negative for PE. Also reports taking Methimazole at home without consistent appts or TSH being checked with question of methimazole toxicity. Hypotensive to 80/40 on admission despite 2L of IVF so started on Levophed. EP consulted and said no need for TVP at this time. Admitted to CICU for further workup. (16 Apr 2024 19:11)    Consulted for: Hx of hyperthyroidism on methimazole    Swedish translation: self (native Swedish speaker)    Endocrine history:  Diagnosed with Graves disease in 2018 but patient thinks she probably had this years before but was undiagnosed prior. 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  Started on methimazole at high dose initially and then subsequently doses were decreased over the years. Denies hx of ZHANG.   She moved to [place] in 2019 but has been going to [place] once a year and sees all her doctors in [place] at that time. She does not see any doctors or gets labs done in the [place]  She was prescribed methimazole 5mg every other day. She reports that once her doctor trialed her off methimazole completely but she had recurrent tremors so was restarted on methimazole every other day. She admits to poor adherence to methimazole dosing; she takes in 2x/week rather than every other day. She denies any tremors, heart racing, anxiety, diarrhea, heat intolerance.    She admits to smoking; she changed from cigarette smoking to electronic cigarettes about 10 days ago.        PAST MEDICAL & SURGICAL HISTORY:  Hyperthyroidism          FAMILY HISTORY:  Denies any known family hx of thyroid disorders    Social History:  +smoking -> now electronic cigarettes    Outpatient Medications:  Methimazole 5mg 2x/week (prescribed as every other day)      MEDICATIONS  (STANDING):  cefepime   IVPB      cefepime   IVPB 1000 milliGRAM(s) IV Intermittent every 8 hours  chlorhexidine 2% Cloths 1 Application(s) Topical <User Schedule>  DOPamine Infusion 5 MICROgram(s)/kG/Min (11.1 mL/Hr) IV Continuous <Continuous>  heparin   Injectable 5000 Unit(s) SubCutaneous every 8 hours    MEDICATIONS  (PRN):      Allergies    No Known Allergies    Intolerances      Review of Systems:  Constitutional: + fever  Eyes: No blurry vision. + proptosis  Neuro: No tremors  HEENT: No pain  Cardiovascular: + chest pain. no palpitations  Respiratory: + cough, hemoptysis  GI: No nausea, vomiting, diarrhea, abdominal pain  : No dysuria  Skin: no rash  Psych: no depression  Endocrine: no polyuria, polydipsia  Hem/lymph: no swelling  Osteoporosis: no fractures    ALL OTHER SYSTEMS REVIEWED AND NEGATIVE      PHYSICAL EXAM:  VITALS: T(C): 36.6 (04-17-24 @ 11:45)  T(F): 97.8 (04-17-24 @ 11:45), Max: 98.5 (04-16-24 @ 16:48)  HR: 68 (04-17-24 @ 14:30) (47 - 106)  BP: 91/46 (04-17-24 @ 14:30) (59/32 - 162/72)  RR:  (12 - 25)  SpO2:  (89% - 100%)  Wt(kg): --  GENERAL: NAD, well-groomed, well-developed  EYES: mild proptosis.  no lid lag, anicteric  HEENT:  Atraumatic, Normocephalic, moist mucous membranes  THYROID: Normal size, no palpable nodules  RESPIRATORY: no respiratory distress; No wheezing  CARDIOVASCULAR: regular rate. No murmurs; no peripheral edema  GI: Soft, nontender, non distended  SKIN: Dry, intact, No rashes or lesions  MUSCULOSKELETAL: LOPEZ  NEURO: sensation intact, extraocular movements intact, no tremor  PSYCH: Alert and oriented x 3, normal affect, normal mood  CUSHING'S SIGNS: no striae      CAPILLARY BLOOD GLUCOSE                                13.3   5.05  )-----------( 154      ( 17 Apr 2024 02:05 )             40.9       04-17    138  |  101  |  9   ----------------------------<  133<H>  3.8   |  23  |  0.69    eGFR: 99    Ca    9.6      04-17  Mg     2.0     04-17  Phos  3.5     04-17    TPro  6.8  /  Alb  4.0  /  TBili  0.3  /  DBili  x   /  AST  17  /  ALT  10  /  AlkPhos  79  04-17      Thyroid Function Tests:  04-17 @ 02:05 TSH -- FreeT4 -- T3 83 Anti TPO -- Anti Thyroglobulin Ab -- TSI --  04-16 @ 14:05 TSH 2.43 FreeT4 -- T3 -- Anti TPO -- Anti Thyroglobulin Ab -- TSI --              Radiology:                HPI:  58-year-old female with a past medical history of hyperthyroidism on methimazole presenting with right-sided chest pain, cough, headache since Friday. Patient also complaining of chills and sweats. Patient had 1 episode of hemoptysis 2 days ago. associated left-sided headache. Patients coworker had COVID last week. EKG in the ED revealed sinus bradycardia with frequent PVCs. Febrile to 101.2 on admission. RVP and UA negative, CTA negative for PE. Also reports taking Methimazole at home without consistent appts or TSH being checked with question of methimazole toxicity. Hypotensive to 80/40 on admission despite 2L of IVF so started on Levophed. EP consulted and said no need for TVP at this time. Admitted to CICU for further workup. (16 Apr 2024 19:11)    Consulted for: Hx of hyperthyroidism on methimazole    Malay translation: self (native Malay speaker)    Endocrine history:  Diagnosed with Graves disease in 2018 but patient thinks she probably had this years before but was undiagnosed prior. 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  Started on methimazole at high dose initially and then subsequently doses were decreased over the years. Denies hx of ZHANG.   She moved to [place] in 2019 but has been going to [place] once a year and sees all her doctors in [place] at that time. She does not see any doctors or gets labs done in the [place]  She was prescribed methimazole 5mg every other day. She reports that once her doctor trialed her off methimazole completely but she had recurrent tremors so was restarted on methimazole every other day. She admits to poor adherence to methimazole dosing; she takes in 2x/week rather than every other day. She denies any tremors, heart racing, anxiety, diarrhea, heat intolerance.    She admits to smoking; she changed from cigarette smoking to electronic cigarettes about 10 days ago.        PAST MEDICAL & SURGICAL HISTORY:  Hyperthyroidism          FAMILY HISTORY:  Denies any known family hx of thyroid disorders    Social History:  +smoking -> now electronic cigarettes    Outpatient Medications:  Methimazole 5mg 2x/week (prescribed as every other day)      MEDICATIONS  (STANDING):  cefepime   IVPB      cefepime   IVPB 1000 milliGRAM(s) IV Intermittent every 8 hours  chlorhexidine 2% Cloths 1 Application(s) Topical <User Schedule>  DOPamine Infusion 5 MICROgram(s)/kG/Min (11.1 mL/Hr) IV Continuous <Continuous>  heparin   Injectable 5000 Unit(s) SubCutaneous every 8 hours    MEDICATIONS  (PRN):      Allergies    No Known Allergies    Intolerances      Review of Systems:  Constitutional: + fever  Eyes: No blurry vision. + proptosis  Neuro: No tremors  HEENT: No pain  Cardiovascular: + chest pain. no palpitations  Respiratory: + cough, hemoptysis  GI: No nausea, vomiting, diarrhea, abdominal pain  : No dysuria  Skin: no rash  Psych: no depression  Endocrine: no polyuria, polydipsia  Hem/lymph: no swelling  Osteoporosis: no fractures    ALL OTHER SYSTEMS REVIEWED AND NEGATIVE      PHYSICAL EXAM:  VITALS: T(C): 36.6 (04-17-24 @ 11:45)  T(F): 97.8 (04-17-24 @ 11:45), Max: 98.5 (04-16-24 @ 16:48)  HR: 68 (04-17-24 @ 14:30) (47 - 106)  BP: 91/46 (04-17-24 @ 14:30) (59/32 - 162/72)  RR:  (12 - 25)  SpO2:  (89% - 100%)  Wt(kg): --  GENERAL: NAD, well-groomed, well-developed  EYES: mild proptosis.  no lid lag, anicteric  HEENT:  Atraumatic, Normocephalic, moist mucous membranes  THYROID: Normal size, no palpable nodules  RESPIRATORY: no respiratory distress; No wheezing  CARDIOVASCULAR: regular rate. No murmurs; no peripheral edema  GI: Soft, nontender, non distended  SKIN: Dry, intact, No rashes or lesions  MUSCULOSKELETAL: LOPEZ  NEURO: sensation intact, extraocular movements intact, no tremor  PSYCH: Alert and oriented x 3, normal affect, normal mood  CUSHING'S SIGNS: no striae      CAPILLARY BLOOD GLUCOSE                                13.3   5.05  )-----------( 154      ( 17 Apr 2024 02:05 )             40.9       04-17    138  |  101  |  9   ----------------------------<  133<H>  3.8   |  23  |  0.69    eGFR: 99    Ca    9.6      04-17  Mg     2.0     04-17  Phos  3.5     04-17    TPro  6.8  /  Alb  4.0  /  TBili  0.3  /  DBili  x   /  AST  17  /  ALT  10  /  AlkPhos  79  04-17      Thyroid Function Tests:  04-17 @ 02:05 TSH -- FreeT4 -- T3 83 Anti TPO -- Anti Thyroglobulin Ab -- TSI --  04-16 @ 14:05 TSH 2.43 FreeT4 -- T3 -- Anti TPO -- Anti Thyroglobulin Ab -- TSI --              Radiology:

## 2024-04-17 NOTE — CONSULT NOTE ADULT - ASSESSMENT
58-year-old female with a past medical history of hyperthyroidism on methimazole presenting with right-sided chest pain, cough, headache since Friday. Patient also complaining of chills and sweats. Patient had 1 episode of hemoptysis 2 days ago. associated left-sided headache. Patients coworker had COVID last week. EKG in the ED revealed sinus bradycardia with frequent PVCs. Febrile to 101.2 on admission. RVP and UA negative, CTA negative for PE.  Hypotensive to 80/40 on admission despite 2L of IVF so started on Levophed. Admitted to CICU for further workup.     ED t max 101.2, No leucocytosis, labs unremarkable. started on vancomycin and Zosyn    Hospital course ; Afebrile, abx switched to cefepime. Being ruled out for TB. remains on presor.  UA 4/16 unremarkable  Limited RVP 4/16 Negative  Bcx 4/16 NGTD  MRSA PCR collected  QuantiFeron collected     Procal 0.05    On cefepime    patient to be seen. 58-year-old female with a past medical history of hyperthyroidism on methimazole presenting with right-sided chest pain, cough, headache since Friday. Patient also complaining of chills and sweats. Patient had 1 episode of hemoptysis 2 days ago. associated left-sided headache. Patients coworker had COVID last week. EKG in the ED revealed sinus bradycardia with frequent PVCs. Febrile to 101.2 on admission. RVP and UA negative, CTA negative for PE.  Hypotensive to 80/40 on admission despite 2L of IVF so started on Levophed. Admitted to CICU for further workup.     ED t max 101.2, No leucocytosis, labs unremarkable. started on vancomycin and Zosyn    Hospital course ; Afebrile, abx switched to cefepime. Being ruled out for TB. On dopamine.  UA 4/16 unremarkable  Limited RVP 4/16 Negative  Bcx 4/16 NGTD  MRSA PCR collected  QuantiFeron collected     Procal 0.05      CTA; No PE, dependent atelectasis  Limited ECHO; no pericardia, pleural effusion       # Fevers without focus  # Cough/ single episode trace blood in sputum  # Hyperthyroidism    - Check CT head   - Would check HIV, CRP, ESR, LONG, RF, Ferritin  - Consider checking full RVP  - Low risk for TB;  r/o TB with AFB X3, Follow up QuantiFeron  - Continue cefepime; would switch to 1 gm Q8h pending further workup      Incomplete note      All recommendations are tentative pending Attending Attestation.    Alexx Garcia MD, PGY-4  ID Fellow  [employer] Teams Preferred  After 5pm/weekends call [phone]       58-year-old female with a past medical history of hyperthyroidism on methimazole presenting with right-sided chest pain, cough, headache since Friday. Patient also complaining of chills and sweats. Patient had 1 episode of hemoptysis 2 days ago. associated left-sided headache. Patients coworker had COVID last week. EKG in the ED revealed sinus bradycardia with frequent PVCs. Febrile to 101.2 on admission. RVP and UA negative, CTA negative for PE.  Hypotensive to 80/40 on admission despite 2L of IVF so started on Levophed. Admitted to CICU for further workup.     ED t max 101.2, No leucocytosis, labs unremarkable. started on vancomycin and Zosyn    Hospital course ; Afebrile, abx switched to cefepime. Being ruled out for TB. On dopamine.  UA 4/16 unremarkable  Limited RVP 4/16 Negative  Bcx 4/16 NGTD  MRSA PCR collected  QuantiFeron collected     Procal 0.05  CRP 30      CTA; No PE, dependent atelectasis  Limited ECHO; no pericardia, pleural effusion       # Fevers   # Cough/ trace blood in sputum  # Hyperthyroidism    - Unclear source of fevers  - Check CT head and CT sinus  - Would check HIV, ESR, LONG, RF, Ferritin  - Check full RVP, Legionella urine Ag  - Low risk for TB;  r/o TB with AFB X3, Follow up QuantiFeron  - Continue cefepime for now; would switch to 1 gm Q8h      Discussed with attending and primary service    Alexx Garcia MD, PGY-4  ID Fellow  [employer] Teams Preferred  After 5pm/weekends call [phone]

## 2024-04-17 NOTE — CONSULT NOTE ADULT - ASSESSMENT
58F w/ Graves disease hyperthyroidism on methimazole presenting with right-sided chest pain, cough, headache, chills/sweats and 1 episode of hemoptysis. Found to be in sinus bradycardia with frequent ectopy, admitted to CICU. Endocrine consulted for hyperthyroidism management    #Graves disease hyperthyroidism  Diagnosed with Graves disease in 2018, also thyroid eye disease (proptosis). Started on methimazole at high dose initially and then subsequently doses were decreased over the years. Now on methimazole ? dose every other day, but has been poorly adherent -- taking it 2x/week. Denies hx of ZHANG. She gets all her medical care once a year in [place]; [place] not [place] doctors in [place]    She denies any tremors, heart racing, anxiety, diarrhea, heat intolerance.  She is here for bradycardia with frequent ectopy.    TSH 2.43, TT3 83, TT4 7.6 wnl    Recommendations:  - Check TSH receptor antibodies and TSI   - Check Free T4   - If TSH receptor antibodies/TSI is negative and patient does not develop hyperthyroid sx may be able to stop methimazole  - Will need to establish care with outpatient endocrinology. If patient is uninsured; then can follow up at [facility] [address] [place] [address] [phone]    #Thyroid eye disease due to graves  She admits to smoking; she changed from cigarette smoking to electronic cigarettes about 10 days ago.  - Counseled on smoking cessation as this can worsen thyroid eye disease    Roderick Dobbs MD  Endocrine Fellow  Can be reached via teams. For follow up questions, discharge recommendations, or new consults, please call answering service at [phone] (weekdays); [phone] (nights/weekends).   58F w/ Graves disease hyperthyroidism on methimazole presenting with right-sided chest pain, cough, headache, chills/sweats and 1 episode of hemoptysis. Found to be in sinus bradycardia with frequent ectopy, admitted to CICU. Endocrine consulted for hyperthyroidism management    #Graves disease hyperthyroidism  Diagnosed with Graves disease in 2018, also thyroid eye disease (proptosis). Started on methimazole at high dose initially and then subsequently doses were decreased over the years. Now on methimazole 5mg dose every other day, but has been poorly adherent -- taking it 2x/week without recurrent sx. Denies hx of ZHANG. She gets all her medical care once a year in [place]; does not see doctors in [place]    She denies any tremors, heart racing, anxiety, diarrhea, heat intolerance.  She is here for bradycardia with frequent ectopy.    TSH 2.43, TT3 83, TT4 7.6 wnl    Recommendations:  - Check TSH receptor antibodies and TSI   - Check Free T4   - Hold methimazole for now  - If TSH receptor antibodies/TSI is negative and patient does not develop hyperthyroid sx may be able to stop methimazole  - Will need to establish care with outpatient endocrinology. If patient is uninsured; then can follow up at 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  - Counseled on smoking cessation as this can worsen thyroid eye disease    Roderick Dobbs MD  Endocrine Fellow  Can be reached via teams. For follow up questions, discharge recommendations, or new consults, please call answering service at [phone] (weekdays); [phone] (nights/weekends).   58F w/ Graves disease hyperthyroidism on methimazole presenting with right-sided chest pain, cough, headache, chills/sweats and 1 episode of hemoptysis. Found to be in sinus bradycardia with frequent ectopy, admitted to CICU. Endocrine consulted for hyperthyroidism management    #Graves disease hyperthyroidism  Diagnosed with Graves disease in 2018, also thyroid eye disease (proptosis). Started on methimazole at high dose initially and then subsequently doses were decreased over the years. Now on methimazole 5mg dose every other day, but has been poorly adherent -- taking it 2x/week without recurrent sx. Denies hx of ZHANG. She gets all her medical care once a year in [place]; does not see doctors in [place]    She denies any tremors, heart racing, anxiety, diarrhea, heat intolerance.  She is here for bradycardia with frequent ectopy.    TSH 2.43, TT3 83, TT4 7.6 wnl    Recommendations:  - Check TSH receptor antibodies and TSI   - Check Free T4   - Hold methimazole for now  - If TSH receptor antibodies/TSI is negative and patient does not develop hyperthyroid sx may be able to stop methimazole  - Check Thyroid US  - Will need to establish care with outpatient endocrinology. 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  - Counseled on smoking cessation as this can worsen thyroid eye disease    Discussed with primary team    Roderick Dobbs MD  Endocrine Fellow  Can be reached via teams. For follow up questions, discharge recommendations, or new consults, please call answering service at [phone] (weekdays); [phone] (nights/weekends).

## 2024-04-17 NOTE — CONSULT NOTE ADULT - SUBJECTIVE AND OBJECTIVE BOX
Patient is a 58y old  Female who presents with a chief complaint of Bradycardia (17 Apr 2024 07:33)    HPI:  58-year-old female with a past medical history of hyperthyroidism on methimazole presenting with right-sided chest pain, cough, headache since Friday. Patient also complaining of chills and sweats. Patient had 1 episode of hemoptysis 2 days ago. associated left-sided headache. Patients coworker had COVID last week. EKG in the ED revealed sinus bradycardia with frequent PVCs. Febrile to 101.2 on admission. RVP and UA negative, CTA negative for PE.  Hypotensive to 80/40 on admission despite 2L of IVF so started on Levophed. Admitted to CICU for further workup.     ED t max 101.2, No leucocytosis, labs unremarkable. started on vancomycin and Zosyn    Hospital course ; Afebrile, abx switched to cefepime. Being ruled out for TB. remains on pressor.  UA 4/16 unremarkable  Limited RVP 4/16 Negative  Bcx 4/16 NGTD  MRSA PCR collected  QuantiFeron collected     Procal 0.05    prior hospital charts reviewed [  ]  primary team notes reviewed [ x ]  other consultant notes reviewed [  ]    PAST MEDICAL & SURGICAL HISTORY:  Hyperthyroidism          Allergies  No Known Allergies    ANTIMICROBIALS (past 90 days)  MEDICATIONS  (STANDING):  cefepime   IVPB   100 mL/Hr IV Intermittent (04-17-24 @ 01:25)    piperacillin/tazobactam IVPB...   200 mL/Hr IV Intermittent (04-16-24 @ 13:47)    vancomycin  IVPB.   250 mL/Hr IV Intermittent (04-16-24 @ 14:34)        cefepime   IVPB 1000 every 12 hours  cefepime   IVPB      MEDICATIONS  (STANDING):  DOPamine Infusion 5 <Continuous>  heparin   Injectable 5000 every 8 hours    SOCIAL HISTORY:       FAMILY HISTORY:    REVIEW OF SYSTEMS  [  ] ROS unobtainable because:    [  ] All other systems negative except as noted below:	    Constitutional:  [ ] fever [ ] chills  [ ] weight loss  [ ] weakness  Skin:  [ ] rash [ ] phlebitis	  Eyes: [ ] icterus [ ] pain  [ ] discharge	  ENMT: [ ] sore throat  [ ] thrush [ ] ulcers [ ] exudates  Respiratory: [ ] dyspnea [ ] hemoptysis [ ] cough [ ] sputum	  Cardiovascular:  [ ] chest pain [ ] palpitations [ ] edema	  Gastrointestinal:  [ ] nausea [ ] vomiting [ ] diarrhea [ ] constipation [ ] pain	  Genitourinary:  [ ] dysuria [ ] frequency [ ] hematuria [ ] discharge [ ] flank pain  [ ] incontinence  Musculoskeletal:  [ ] myalgias [ ] arthralgias [ ] arthritis  [ ] back pain  Neurological:  [ ] headache [ ] seizures  [ ] confusion/altered mental status  Psychiatric:  [ ] anxiety [ ] depression	  Hematology/Lymphatics:  [ ] lymphadenopathy  Endocrine:  [ ] adrenal [ ] thyroid  Allergic/Immunologic:	 [ ] transplant [ ] seasonal    Vital Signs Last 24 Hrs  T(F): 98.2 (04-17-24 @ 08:00), Max: 101.2 (04-16-24 @ 10:17)  Vital Signs Last 24 Hrs  HR: 48 (04-17-24 @ 10:15) (35 - 106)  BP: 100/52 (04-17-24 @ 10:15) (59/32 - 162/72)  RR: 24 (04-17-24 @ 10:15)  SpO2: 97% (04-17-24 @ 10:15) (89% - 100%)  Wt(kg): --    PHYSICAL EXAM:                              13.3   5.05  )-----------( 154      ( 17 Apr 2024 02:05 )             40.9   04-17    139  |  102  |  10  ----------------------------<  136<H>  3.7   |  26  |  0.58    Ca    9.1      17 Apr 2024 02:04  Phos  3.5     04-17  Mg     2.0     04-17    TPro  6.8  /  Alb  4.0  /  TBili  0.3  /  DBili  x   /  AST  18  /  ALT  10  /  AlkPhos  82  04-17    Urinalysis Basic - ( 17 Apr 2024 02:04 )      MICROBIOLOGY:              RADIOLOGY:  imaging below personally reviewed and agree with findings    < from: CT Angio Chest PE Protocol w/ IV Cont (04.16.24 @ 14:33) >    ACC: [id number] EXAM:  CT ANGIO CHEST PULM ART [facility]   ORDERED BY: DIANELYS MATHIAS     PROCEDURE DATE:  04/16/2024          INTERPRETATION:  CLINICAL INFORMATION: Chest pain    COMPARISON: Chest x-ray 4/16/2024    CONTRAST/COMPLICATIONS:  IV Contrast: Omnipaque 350  70 cc administered   30 cc discarded  Oral Contrast: NONE  Complications: None reported at time of study completion    PROCEDURE:  CT Angiography of the Chest.  Sagittal and coronal reformats were performed as well as 3D (MIP)   reconstructions.    FINDINGS:    LUNGS AND AIRWAYS: Patent central airways.  Dependent atelectasis.  PLEURA: No pleural effusion.  MEDIASTINUM AND ALANA: No lymphadenopathy.  VESSELS: No pulmonary embolus  HEART: Heart size is normal. No pericardial effusion.  CHEST WALL AND LOWER NECK: Bilateral breast implants  VISUALIZED UPPER ABDOMEN: Within normal limits.  BONES: Within normal limits.    IMPRESSION:  No pulmonary embolus        --- End of Report ---    < end of copied text >  < from: POCUS ED TTE 2D F/U, Limited w/o Cont. (04.16.24 @ 13:53) >    Procedure was performed in the Emergency Department by a credentialed   Emergency Medicine Attending Physician    EXAM:  ER TTE LIMITED      ORDER COMMENTS:      PROCEDURE DATE:  04/16/2024    FOCUSED ED ULTRASOUND REPORT          INTERPRETATION:  A focused transthoracic cardiac ultrasound examination   was performed.  No pericardial effusion was present.  Irregular heart rhythm.  No global wall motion abnormality was identified.  Bilateral lungs A-line predominant without visible B-lines. No pleural   effusion.    IMPRESSION:  No Pericardial Effusion.  Irregular heart rhythm.  No Pleural effusion.    --- End of Report ---    < end of copied text >   Patient is a 58y old  Female who presents with a chief complaint of Bradycardia (2024 07:33)    HPI:  58-year-old female with a past medical history of hyperthyroidism on methimazole presenting with right-sided chest pain, cough, headache since Friday. Patient also complaining of chills and sweats. Patient spitted out blood one time 2 days ago while brushing her teeth. She also chronic headaches but is worse worse and mostly left-sided headache. Patients friend had COVID last week. EKG in the ED revealed sinus bradycardia with frequent PVCs. Febrile to 101.2 on admission. RVP and UA negative, CTA negative for PE.  Hypotensive to 80/40 on admission despite 2L of IVF so started on Levophed. Admitted to CICU for further workup.     ED T max 101.2, No leucocytosis, labs unremarkable. started on vancomycin and Zosyn    Hospital course ; Afebrile, abx switched to cefepime. Being ruled out for TB. remains on pressor.  UA  unremarkable  Limited RVP  Negative  Bcx  NGTD  MRSA PCR collected  QuantiFeron collected     Procal 0.05    prior hospital charts reviewed [  ]  primary team notes reviewed [ x ]  other consultant notes reviewed [x  ]    PAST MEDICAL & SURGICAL HISTORY:  Hyperthyroidism          Allergies  No Known Allergies    ANTIMICROBIALS (past 90 days)  MEDICATIONS  (STANDING):  cefepime   IVPB   100 mL/Hr IV Intermittent (24 @ 01:25)    piperacillin/tazobactam IVPB...   200 mL/Hr IV Intermittent (24 @ 13:47)    vancomycin  IVPB.   250 mL/Hr IV Intermittent (24 @ 14:34)        cefepime   IVPB 1000 every 12 hours  cefepime   IVPB      MEDICATIONS  (STANDING):  DOPamine Infusion 5 <Continuous>  heparin   Injectable 5000 every 8 hours    SOCIAL HISTORY:  Born [place] grew up in [place], lived 20 years in [place], in [place], travels back to [place] every summer spends about 2 months. Smoker, recently started vaping 3 weeks back. Drinks 1 drink of scotch every day. Not sexually active. Lives with daughter and has a dog. No recent travels, does not go out of home much. 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    FAMILY HISTORY: Lung cancer mother, Brother heart attack/stroke  from stroke 59yrs. Sister  from Alcoholic liver, disease    REVIEW OF SYSTEMS  [  ] ROS unobtainable because:    [  x] All other systems negative except as noted below:	    Constitutional:  [x ] fever [x ] chills  [ ] weight loss  [x ] weakness  Skin:  [ ] rash [ ] phlebitis	  Eyes: [ ] icterus [ ] pain  [ ] discharge	  ENMT: [ ] sore throat  [ ] thrush [ ] ulcers [ ] exudates  Respiratory: [ ] dyspnea [ ] hemoptysis [x ] cough [ ] sputum	  Cardiovascular:  [ ] chest pain [x ] palpitations [ ] edema	  Gastrointestinal:  [ ] nausea [ ] vomiting [ ] diarrhea [x ] constipation [ ] pain	  Genitourinary:  [ ] dysuria [ ] frequency [ ] hematuria [ ] discharge [ ] flank pain  [ ] incontinence  Musculoskeletal:  [ ] myalgias [ ] arthralgias [ ] arthritis  [ ] back pain  Neurological:  [x ] headache [ ] seizures  [ ] confusion/altered mental status  Psychiatric:  [ ] anxiety [ ] depression	  Hematology/Lymphatics:  [ ] lymphadenopathy  Endocrine:  [ ] adrenal [ ] thyroid  Allergic/Immunologic:	 [ ] transplant [ ] seasonal    Vital Signs Last 24 Hrs  T(F): 98.2 (24 @ 08:00), Max: 101.2 (24 @ 10:17)  Vital Signs Last 24 Hrs  HR: 48 (24 @ 10:15) (35 - 106)  BP: 100/52 (24 @ 10:15) (59/32 - 162/72)  RR: 24 (24 @ 10:15)  SpO2: 97% (24 @ 10:15) (89% - 100%)  Wt(kg): --    PHYSICAL EXAM:    General: Patient in NAD  HEENT: NCAT, EOMI, PERRL, no oral lesions, neck supple  CV: S1+S2, no m/r/g appreciated   Lungs: No respiratory distress, CTAB  Abd: Soft, nontender, no guarding, no rebound tenderness, + bowel sounds   : No suprapubic tenderness  Neuro: Alert and oriented to time, place and person. Moves all extremities against gravity.  Ext: No cyanosis, no edema  Skin: No rash, no phlebitis                            13.3   5.05  )-----------( 154      ( 2024 02:05 )             40.9   -    139  |  102  |  10  ----------------------------<  136<H>  3.7   |  26  |  0.58    Ca    9.1      2024 02:04  Phos  3.5     04-  Mg     2.0     -    TPro  6.8  /  Alb  4.0  /  TBili  0.3  /  DBili  x   /  AST  18  /  ALT  10  /  AlkPhos  82  -    Urinalysis Basic - ( 2024 02:04 )      MICROBIOLOGY:              RADIOLOGY:  imaging below personally reviewed and agree with findings    < from: CT Angio Chest PE Protocol w/ IV Cont (24 @ 14:33) >    ACC: [id number] EXAM:  CT ANGIO CHEST PULM ART [facility]   ORDERED BY: DIANELYS MATHIAS     PROCEDURE DATE:  2024          INTERPRETATION:  CLINICAL INFORMATION: Chest pain    COMPARISON: Chest x-ray 2024    CONTRAST/COMPLICATIONS:  IV Contrast: Omnipaque 350  70 cc administered   30 cc discarded  Oral Contrast: NONE  Complications: None reported at time of study completion    PROCEDURE:  CT Angiography of the Chest.  Sagittal and coronal reformats were performed as well as 3D (MIP)   reconstructions.    FINDINGS:    LUNGS AND AIRWAYS: Patent central airways.  Dependent atelectasis.  PLEURA: No pleural effusion.  MEDIASTINUM AND ALANA: No lymphadenopathy.  VESSELS: No pulmonary embolus  HEART: Heart size is normal. No pericardial effusion.  CHEST WALL AND LOWER NECK: Bilateral breast implants  VISUALIZED UPPER ABDOMEN: Within normal limits.  BONES: Within normal limits.    IMPRESSION:  No pulmonary embolus        --- End of Report ---    < end of copied text >  < from: POCUS ED TTE 2D F/U, Limited w/o Cont. (24 @ 13:53) >    Procedure was performed in the Emergency Department by a credentialed   Emergency Medicine Attending Physician    EXAM:  ER TTE LIMITED      ORDER COMMENTS:      PROCEDURE DATE:  2024    FOCUSED ED ULTRASOUND REPORT          INTERPRETATION:  A focused transthoracic cardiac ultrasound examination   was performed.  No pericardial effusion was present.  Irregular heart rhythm.  No global wall motion abnormality was identified.  Bilateral lungs A-line predominant without visible B-lines. No pleural   effusion.    IMPRESSION:  No Pericardial Effusion.  Irregular heart rhythm.  No Pleural effusion.    --- End of Report ---    < end of copied text >   Patient is a 58y old  Female who presents with a chief complaint of Bradycardia (2024 07:33)    HPI:  58-year-old female with a past medical history of hyperthyroidism on methimazole presenting with right-sided chest pain, cough, headache since Friday. Patient also complaining of chills and sweats. Patient spitted out blood one time 2 days ago while brushing her teeth. She also chronic headaches but is worse worse and mostly left-sided headache. Patients friend had COVID last week. EKG in the ED revealed sinus bradycardia with frequent PVCs. Febrile to 101.2 on admission. RVP and UA negative, CTA negative for PE.  Hypotensive to 80/40 on admission despite 2L of IVF so started on Levophed. Admitted to CICU for further workup.     ED T max 101.2, No leucocytosis, labs unremarkable. started on vancomycin and Zosyn    Hospital course ; Afebrile, abx switched to cefepime. Being ruled out for TB. remains on pressor.  UA  unremarkable  Limited RVP  Negative  Bcx  NGTD  MRSA PCR collected  QuantiFeron collected     Procal 0.05    prior hospital charts reviewed [  ]  primary team notes reviewed [ x ]  other consultant notes reviewed [x  ]    PAST MEDICAL & SURGICAL HISTORY:  Hyperthyroidism          Allergies  No Known Allergies    ANTIMICROBIALS (past 90 days)  MEDICATIONS  (STANDING):  cefepime   IVPB   100 mL/Hr IV Intermittent (24 @ 01:25)    piperacillin/tazobactam IVPB...   200 mL/Hr IV Intermittent (24 @ 13:47)    vancomycin  IVPB.   250 mL/Hr IV Intermittent (24 @ 14:34)        cefepime   IVPB 1000 every 12 hours  cefepime   IVPB      MEDICATIONS  (STANDING):  DOPamine Infusion 5 <Continuous>  heparin   Injectable 5000 every 8 hours    SOCIAL HISTORY:  Born [place] grew up [place], lived [place] years in [place], [place], [place] back to [place] every summer spends about 2 months. Smoker, recently started vaping 3 weeks back. Drinks 1 drink of scotch every day. Not sexually active. Lives with daughter and has a dog. No recent travels, does not go out of home much. 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    FAMILY HISTORY: Lung cancer mother, Brother heart attack/stroke  from stroke 59yrs. Sister  from Alcoholic liver, disease    REVIEW OF SYSTEMS  [  ] ROS unobtainable because:    [  x] All other systems negative except as noted below:	    Constitutional:  [x ] fever [x ] chills  [ ] weight loss  [x ] weakness  Skin:  [ ] rash [ ] phlebitis	  Eyes: [ ] icterus [ ] pain  [ ] discharge	  ENMT: [ ] sore throat  [ ] thrush [ ] ulcers [ ] exudates  Respiratory: [ ] dyspnea [ ] hemoptysis [x ] cough [ ] sputum	  Cardiovascular:  [ ] chest pain [x ] palpitations [ ] edema	  Gastrointestinal:  [ ] nausea [ ] vomiting [ ] diarrhea [x ] constipation [ ] pain	  Genitourinary:  [ ] dysuria [ ] frequency [ ] hematuria [ ] discharge [ ] flank pain  [ ] incontinence  Musculoskeletal:  [ ] myalgias [ ] arthralgias [ ] arthritis  [ ] back pain  Neurological:  [x ] headache [ ] seizures  [ ] confusion/altered mental status  Psychiatric:  [ ] anxiety [ ] depression	  Hematology/Lymphatics:  [ ] lymphadenopathy  Endocrine:  [ ] adrenal [ ] thyroid  Allergic/Immunologic:	 [ ] transplant [ ] seasonal    Vital Signs Last 24 Hrs  T(F): 98.2 (24 @ 08:00), Max: 101.2 (24 @ 10:17)  Vital Signs Last 24 Hrs  HR: 48 (24 @ 10:15) (35 - 106)  BP: 100/52 (24 @ 10:15) (59/32 - 162/72)  RR: 24 (24 @ 10:15)  SpO2: 97% (24 @ 10:15) (89% - 100%)  Wt(kg): --    PHYSICAL EXAM:    General: Patient in NAD  HEENT: NCAT, EOMI, PERRL, no oral lesions, neck supple  CV: S1+S2, no m/r/g appreciated   Lungs: No respiratory distress, CTAB  Abd: Soft, nontender, no guarding, no rebound tenderness, + bowel sounds   : No suprapubic tenderness  Neuro: Alert and oriented to time, place and person. Moves all extremities against gravity.  Ext: No cyanosis, no edema  Skin: No rash, no phlebitis                            13.3   5.05  )-----------( 154      ( 2024 02:05 )             40.9       139  |  102  |  10  ----------------------------<  136<H>  3.7   |  26  |  0.58    Ca    9.1      2024 02:04  Phos  3.5     -  Mg     2.0         TPro  6.8  /  Alb  4.0  /  TBili  0.3  /  DBili  x   /  AST  18  /  ALT  10  /  AlkPhos  82      Urinalysis Basic - ( 2024 02:04 )    Thyroid Stimulating Hormone, Serum (24 @ 14:05)    Thyroid Stimulating Hormone, Serum: 2.43 uIU/mL      RADIOLOGY:  imaging below personally reviewed and agree with findings    < from: CT Angio Chest PE Protocol w/ IV Cont (24 @ 14:33) >    ACC: [id number] EXAM:  CT ANGIO CHEST PULM [facility]   ORDERED BY: DIANELYS MATHIAS     PROCEDURE DATE:  2024          INTERPRETATION:  CLINICAL INFORMATION: Chest pain    COMPARISON: Chest x-ray 2024    CONTRAST/COMPLICATIONS:  IV Contrast: Omnipaque 350  70 cc administered   30 cc discarded  Oral Contrast: NONE  Complications: None reported at time of study completion    PROCEDURE:  CT Angiography of the Chest.  Sagittal and coronal reformats were performed as well as 3D (MIP)   reconstructions.    FINDINGS:    LUNGS AND AIRWAYS: Patent central airways.  Dependent atelectasis.  PLEURA: No pleural effusion.  MEDIASTINUM AND ALANA: No lymphadenopathy.  VESSELS: No pulmonary embolus  HEART: Heart size is normal. No pericardial effusion.  CHEST WALL AND LOWER NECK: Bilateral breast implants  VISUALIZED UPPER ABDOMEN: Within normal limits.  BONES: Within normal limits.    IMPRESSION:  No pulmonary embolus        --- End of Report ---    < end of copied text >  < from: POCUS ED TTE 2D F/U, Limited w/o Cont. (24 @ 13:53) >    Procedure was performed in the Emergency Department by a credentialed   Emergency Medicine Attending Physician    EXAM:  ER TTE LIMITED      ORDER COMMENTS:      PROCEDURE DATE:  2024    FOCUSED ED ULTRASOUND REPORT          INTERPRETATION:  A focused transthoracic cardiac ultrasound examination   was performed.  No pericardial effusion was present.  Irregular heart rhythm.  No global wall motion abnormality was identified.  Bilateral lungs A-line predominant without visible B-lines. No pleural   effusion.    IMPRESSION:  No Pericardial Effusion.  Irregular heart rhythm.  No Pleural effusion.    --- End of Report ---    < end of copied text >

## 2024-04-18 LAB
ALBUMIN SERPL ELPH-MCNC: 3.4 G/DL — SIGNIFICANT CHANGE UP (ref 3.3–5)
ALP SERPL-CCNC: 91 U/L — SIGNIFICANT CHANGE UP (ref 40–120)
ALT FLD-CCNC: 9 U/L — LOW (ref 10–45)
ANION GAP SERPL CALC-SCNC: 14 MMOL/L — SIGNIFICANT CHANGE UP (ref 5–17)
APTT BLD: 48.1 SEC — HIGH (ref 24.5–35.6)
AST SERPL-CCNC: 16 U/L — SIGNIFICANT CHANGE UP (ref 10–40)
B BURGDOR C6 AB SER-ACNC: NEGATIVE — SIGNIFICANT CHANGE UP
B BURGDOR IGG+IGM SER-ACNC: 0.18 INDEX — SIGNIFICANT CHANGE UP (ref 0.01–0.89)
BASOPHILS # BLD AUTO: 0.18 K/UL — SIGNIFICANT CHANGE UP (ref 0–0.2)
BASOPHILS NFR BLD AUTO: 3.7 % — HIGH (ref 0–2)
BILIRUB SERPL-MCNC: 0.2 MG/DL — SIGNIFICANT CHANGE UP (ref 0.2–1.2)
BLD GP AB SCN SERPL QL: NEGATIVE — SIGNIFICANT CHANGE UP
BUN SERPL-MCNC: 11 MG/DL — SIGNIFICANT CHANGE UP (ref 7–23)
CALCIUM SERPL-MCNC: 9.3 MG/DL — SIGNIFICANT CHANGE UP (ref 8.4–10.5)
CHLORIDE SERPL-SCNC: 101 MMOL/L — SIGNIFICANT CHANGE UP (ref 96–108)
CO2 SERPL-SCNC: 21 MMOL/L — LOW (ref 22–31)
CORTIS AM PEAK SERPL-MCNC: 5.3 UG/DL — LOW (ref 6–18.4)
CREAT SERPL-MCNC: 0.66 MG/DL — SIGNIFICANT CHANGE UP (ref 0.5–1.3)
CULTURE RESULTS: SIGNIFICANT CHANGE UP
EGFR: 100 ML/MIN/1.73M2 — SIGNIFICANT CHANGE UP
EOSINOPHIL # BLD AUTO: 0.09 K/UL — SIGNIFICANT CHANGE UP (ref 0–0.5)
EOSINOPHIL NFR BLD AUTO: 1.8 % — SIGNIFICANT CHANGE UP (ref 0–6)
FERRITIN SERPL-MCNC: 232 NG/ML — SIGNIFICANT CHANGE UP (ref 13–330)
FERRITIN SERPL-MCNC: 289 NG/ML — SIGNIFICANT CHANGE UP (ref 13–330)
GLUCOSE SERPL-MCNC: 136 MG/DL — HIGH (ref 70–99)
HCT VFR BLD CALC: 38.8 % — SIGNIFICANT CHANGE UP (ref 34.5–45)
HGB BLD-MCNC: 12.9 G/DL — SIGNIFICANT CHANGE UP (ref 11.5–15.5)
HIV 1 & 2 AB SERPL IA.RAPID: SIGNIFICANT CHANGE UP
HIV 1+2 AB+HIV1 P24 AG SERPL QL IA: SIGNIFICANT CHANGE UP
INR BLD: 0.87 RATIO — SIGNIFICANT CHANGE UP (ref 0.85–1.18)
LYMPHOCYTES # BLD AUTO: 2.01 K/UL — SIGNIFICANT CHANGE UP (ref 1–3.3)
LYMPHOCYTES # BLD AUTO: 41.8 % — SIGNIFICANT CHANGE UP (ref 13–44)
MAGNESIUM SERPL-MCNC: 2 MG/DL — SIGNIFICANT CHANGE UP (ref 1.6–2.6)
MANUAL SMEAR VERIFICATION: SIGNIFICANT CHANGE UP
MCHC RBC-ENTMCNC: 31.1 PG — SIGNIFICANT CHANGE UP (ref 27–34)
MCHC RBC-ENTMCNC: 33.2 GM/DL — SIGNIFICANT CHANGE UP (ref 32–36)
MCV RBC AUTO: 93.5 FL — SIGNIFICANT CHANGE UP (ref 80–100)
MONOCYTES # BLD AUTO: 0.17 K/UL — SIGNIFICANT CHANGE UP (ref 0–0.9)
MONOCYTES NFR BLD AUTO: 3.6 % — SIGNIFICANT CHANGE UP (ref 2–14)
NEUTROPHILS # BLD AUTO: 2.27 K/UL — SIGNIFICANT CHANGE UP (ref 1.8–7.4)
NEUTROPHILS NFR BLD AUTO: 47.3 % — SIGNIFICANT CHANGE UP (ref 43–77)
PHOSPHATE SERPL-MCNC: 3.9 MG/DL — SIGNIFICANT CHANGE UP (ref 2.5–4.5)
PLAT MORPH BLD: NORMAL — SIGNIFICANT CHANGE UP
PLATELET # BLD AUTO: 183 K/UL — SIGNIFICANT CHANGE UP (ref 150–400)
POTASSIUM SERPL-MCNC: 4.3 MMOL/L — SIGNIFICANT CHANGE UP (ref 3.5–5.3)
POTASSIUM SERPL-SCNC: 4.3 MMOL/L — SIGNIFICANT CHANGE UP (ref 3.5–5.3)
PROT SERPL-MCNC: 6.4 G/DL — SIGNIFICANT CHANGE UP (ref 6–8.3)
PROTHROM AB SERPL-ACNC: 9.6 SEC — SIGNIFICANT CHANGE UP (ref 9.5–13)
RBC # BLD: 4.15 M/UL — SIGNIFICANT CHANGE UP (ref 3.8–5.2)
RBC # FLD: 12.5 % — SIGNIFICANT CHANGE UP (ref 10.3–14.5)
RBC BLD AUTO: SIGNIFICANT CHANGE UP
RH IG SCN BLD-IMP: POSITIVE — SIGNIFICANT CHANGE UP
RHEUMATOID FACT SERPL-ACNC: 10 IU/ML — SIGNIFICANT CHANGE UP (ref 0–13)
SODIUM SERPL-SCNC: 136 MMOL/L — SIGNIFICANT CHANGE UP (ref 135–145)
SPECIMEN SOURCE: SIGNIFICANT CHANGE UP
T4 FREE SERPL-MCNC: 1.1 NG/DL — SIGNIFICANT CHANGE UP (ref 0.9–1.8)
VARIANT LYMPHS # BLD: 1.8 % — SIGNIFICANT CHANGE UP (ref 0–6)
WBC # BLD: 4.8 K/UL — SIGNIFICANT CHANGE UP (ref 3.8–10.5)
WBC # FLD AUTO: 4.8 K/UL — SIGNIFICANT CHANGE UP (ref 3.8–10.5)

## 2024-04-18 PROCEDURE — 93010 ELECTROCARDIOGRAM REPORT: CPT

## 2024-04-18 PROCEDURE — 76536 US EXAM OF HEAD AND NECK: CPT | Mod: 26

## 2024-04-18 PROCEDURE — 99232 SBSQ HOSP IP/OBS MODERATE 35: CPT | Mod: GC

## 2024-04-18 PROCEDURE — 99292 CRITICAL CARE ADDL 30 MIN: CPT | Mod: 25

## 2024-04-18 PROCEDURE — 99291 CRITICAL CARE FIRST HOUR: CPT

## 2024-04-18 PROCEDURE — 99233 SBSQ HOSP IP/OBS HIGH 50: CPT

## 2024-04-18 RX ORDER — MIDODRINE HYDROCHLORIDE 2.5 MG/1
5 TABLET ORAL EVERY 8 HOURS
Refills: 0 | Status: DISCONTINUED | OUTPATIENT
Start: 2024-04-18 | End: 2024-04-20

## 2024-04-18 RX ORDER — POLYETHYLENE GLYCOL 3350 17 G/17G
17 POWDER, FOR SOLUTION ORAL
Refills: 0 | Status: DISCONTINUED | OUTPATIENT
Start: 2024-04-18 | End: 2024-04-22

## 2024-04-18 RX ADMIN — HEPARIN SODIUM 5000 UNIT(S): 5000 INJECTION INTRAVENOUS; SUBCUTANEOUS at 05:25

## 2024-04-18 RX ADMIN — CEFEPIME 100 MILLIGRAM(S): 1 INJECTION, POWDER, FOR SOLUTION INTRAMUSCULAR; INTRAVENOUS at 13:40

## 2024-04-18 RX ADMIN — MIDODRINE HYDROCHLORIDE 5 MILLIGRAM(S): 2.5 TABLET ORAL at 13:40

## 2024-04-18 RX ADMIN — Medication 650 MILLIGRAM(S): at 01:00

## 2024-04-18 RX ADMIN — SENNA PLUS 2 TABLET(S): 8.6 TABLET ORAL at 00:02

## 2024-04-18 RX ADMIN — CEFEPIME 100 MILLIGRAM(S): 1 INJECTION, POWDER, FOR SOLUTION INTRAMUSCULAR; INTRAVENOUS at 05:24

## 2024-04-18 RX ADMIN — MIDODRINE HYDROCHLORIDE 5 MILLIGRAM(S): 2.5 TABLET ORAL at 10:37

## 2024-04-18 RX ADMIN — MIDODRINE HYDROCHLORIDE 5 MILLIGRAM(S): 2.5 TABLET ORAL at 22:21

## 2024-04-18 RX ADMIN — CEFEPIME 100 MILLIGRAM(S): 1 INJECTION, POWDER, FOR SOLUTION INTRAMUSCULAR; INTRAVENOUS at 22:21

## 2024-04-18 RX ADMIN — POLYETHYLENE GLYCOL 3350 17 GRAM(S): 17 POWDER, FOR SOLUTION ORAL at 13:40

## 2024-04-18 RX ADMIN — DOPAMINE HYDROCHLORIDE 11.1 MICROGRAM(S)/KG/MIN: 40 INJECTION, SOLUTION, CONCENTRATE INTRAVENOUS at 10:39

## 2024-04-18 RX ADMIN — Medication 650 MILLIGRAM(S): at 00:01

## 2024-04-18 RX ADMIN — HEPARIN SODIUM 5000 UNIT(S): 5000 INJECTION INTRAVENOUS; SUBCUTANEOUS at 22:21

## 2024-04-18 RX ADMIN — HEPARIN SODIUM 5000 UNIT(S): 5000 INJECTION INTRAVENOUS; SUBCUTANEOUS at 13:40

## 2024-04-18 NOTE — DIETITIAN INITIAL EVALUATION ADULT - OTHER INFO
Patient confirms NKFA. Patient reports her height to be 165cm/5'5". Patient unsure of her UBW as she has not weighed herself for some time now, but feels that she had actually gained weight with her clothes being a bit more tight fitting in recent weeks. Will monitor weight trend.    - Electrolytes currently WNL.  - On dopamine gtt noted.

## 2024-04-18 NOTE — PROGRESS NOTE ADULT - SUBJECTIVE AND OBJECTIVE BOX
CUTR GURROLA  MRN-[id number]  Patient is a 58y old  Female who presents with a chief complaint of Bradycardia (18 Apr 2024 16:08)    HPI:  58-year-old female with a past medical history of hyperthyroidism on methimazole presenting with right-sided chest pain, cough, headache since Friday. Patient also complaining of chills and sweats. Patient had 1 episode of hemoptysis 2 days ago. associated left-sided headache. Patients coworker had COVID last week. EKG in the ED revealed sinus bradycardia with frequent PVCs. Febrile to 101.2 on admission. RVP and UA negative, CTA negative for PE. Also reports taking Methimazole at home without consistent appts or TSH being checked with question of methimazole toxicity. Hypotensive to 80/40 on admission despite 2L of IVF so started on Levophed. EP consulted and said no need for TVP at this time. Admitted to CICU for further workup. (16 Apr 2024 19:11)      Hospital Course:    24 HOUR EVENTS:    REVIEW OF SYSTEMS:    CONSTITUTIONAL: No weakness, fevers or chills  EYES/ENT: No visual changes;  No vertigo or throat pain   NECK: No pain or stiffness  RESPIRATORY: No cough, wheezing, hemoptysis; No shortness of breath  CARDIOVASCULAR: No chest pain or palpitations  GASTROINTESTINAL: No abdominal or epigastric pain. No nausea, vomiting, or hematemesis; No diarrhea or constipation. No melena or hematochezia.  GENITOURINARY: No dysuria, frequency or hematuria  NEUROLOGICAL: No numbness or weakness  SKIN: No itching, rashes      ICU Vital Signs Last 24 Hrs  T(C): 37.1 (18 Apr 2024 19:30), Max: 37.2 (17 Apr 2024 23:00)  T(F): 98.7 (18 Apr 2024 19:30), Max: 99 (17 Apr 2024 23:00)  HR: 65 (18 Apr 2024 20:00) (43 - 115)  BP: 95/46 (18 Apr 2024 19:30) (77/40 - 131/63)  BP(mean): 67 (18 Apr 2024 19:30) (56 - 83)  ABP: --  ABP(mean): --  RR: 39 (18 Apr 2024 20:00) (14 - 48)  SpO2: 98% (18 Apr 2024 20:00) (95% - 99%)    O2 Parameters below as of 18 Apr 2024 19:30  Patient On (Oxygen Delivery Method): room air      CVP(mm Hg): --  CO: --  CI: --  PA: --  PA(mean): --  PA(direct): --  PCWP: --  LA: --  RA: --  SVR: --  SVRI: --  PVR: --  PVRI: --  I&O's Summary    17 Apr 2024 07:01  -  18 Apr 2024 07:00  --------------------------------------------------------  IN: 1129.2 mL / OUT: 2750 mL / NET: -1620.8 mL    18 Apr 2024 07:01  -  18 Apr 2024 20:12  --------------------------------------------------------  IN: 816.5 mL / OUT: 1000 mL / NET: -183.5 mL    CAPILLARY BLOOD GLUCOSE    CAPILLARY BLOOD GLUCOSE      PHYSICAL EXAM:  GENERAL: No acute distress, well-developed  HEAD:  Atraumatic, Normocephalic  EYES: EOMI, PERRLA, conjunctiva and sclera clear  NECK: Supple, no lymphadenopathy, no JVD  CHEST/LUNG: CTAB; No wheezes, rales, or rhonchi  HEART: Regular rate and rhythm. Normal S1/S2. No murmurs, rubs, or gallops  ABDOMEN: Soft, non-tender, non-distended; normal bowel sounds, no organomegaly  EXTREMITIES:  2+ peripheral pulses b/l, No clubbing, cyanosis, or edema  NEUROLOGY: A&O x 3, no focal deficits  SKIN: No rashes or lesions    ============================I/O===========================   I&O's Detail    17 Apr 2024 07:01  -  18 Apr 2024 07:00  --------------------------------------------------------  IN:    DOPamine Infusion: 99.9 mL    DOPamine Infusion: 166.5 mL    IV PiggyBack: 150 mL    Oral Fluid: 640 mL    Phenylephrine: 72.8 mL  Total IN: 1129.2 mL    OUT:    Voided (mL): 2750 mL  Total OUT: 2750 mL    Total NET: -1620.8 mL      18 Apr 2024 07:01  -  18 Apr 2024 20:12  --------------------------------------------------------  IN:    DOPamine Infusion: 66.5 mL    IV PiggyBack: 50 mL    Oral Fluid: 700 mL  Total IN: 816.5 mL    OUT:    Voided (mL): 1000 mL  Total OUT: 1000 mL    Total NET: -183.5 mL      ============================ LABS =========================                        12.9   4.80  )-----------( 183      ( 18 Apr 2024 00:48 )             38.8     04-18    136  |  101  |  11  ----------------------------<  136<H>  4.3   |  21<L>  |  0.66    Ca    9.3      18 Apr 2024 00:48  Phos  3.9     04-18  Mg     2.0     04-18    TPro  6.4  /  Alb  3.4  /  TBili  0.2  /  DBili  x   /  AST  16  /  ALT  9<L>  /  AlkPhos  91  04-18    Troponin T, High Sensitivity Result: <6 ng/L (04-16-24 @ 14:05)      LIVER FUNCTIONS - ( 18 Apr 2024 00:48 )  Alb: 3.4 g/dL / Pro: 6.4 g/dL / ALK PHOS: 91 U/L / ALT: 9 U/L / AST: 16 U/L / GGT: x           PT/INR - ( 18 Apr 2024 00:48 )   PT: 9.6 sec;   INR: 0.87 ratio         PTT - ( 18 Apr 2024 00:48 )  PTT:48.1 sec    Blood Gas Venous - Lactate: 1.6 mmol/L (04-16-24 @ 10:40)    Urinalysis Basic - ( 18 Apr 2024 00:48 )    Color: x / Appearance: x / SG: x / pH: x  Gluc: 136 mg/dL / Ketone: x  / Bili: x / Urobili: x   Blood: x / Protein: x / Nitrite: x   Leuk Esterase: x / RBC: x / WBC x   Sq Epi: x / Non Sq Epi: x / Bacteria: x    ======================Micro/Rad/Cardio=================  Telemtry: Reviewed   EKG: Reviewed  CXR: Reviewed  Culture: Reviewed   Echo:   Cath:   ======================================================  PAST MEDICAL & SURGICAL HISTORY:  Hyperthyroidism      ====================ASSESSMENT ==============  A/P:58F Hungarian-speaking Hx hyperthyroidism (on methimazole) who presented to the ED w/ R-sided chest pain, cough, headache since Friday a/w chills and sweats and 1 episode of blood-tinged sputum.  Describes episodes of night sweats for weeks. Persistent hypotension despite 3L IVF in ED now requiring pressor support w/ Levophed--->dopamine. Telemetry w/ sinus bradycardia to 50s w/ significant ectopy (bigemy, trigeminy). R/o TB and fever of unknown origin (infectious vs non-infectious). Transferred to CICU for further care.     Neuro:   Aox3, no active issues    Cardio:   #Sinus bradycardia with frequent PVCs, hypotension   - currently on dopamine gtt  - TFTs wnl   - Echo EF 63%, septal abnormality, RVF wnl  - Extensive serology ordered to figure out etiology including ESR/CRP (both elevated), HIV (-), RF -, Lyme -, Leptospirosis -, ferritin wnl, LONG pend  - EP: trial of Midodrine 5TID, NPO@MN in case of procedure, attempt to wean dopamine (s/p yu)  - EP recs appreciated    Pulmonary:   # R/o TB  airborne precautions for now  Not producing sputum currently, hx of fevers, night sweats, episode of hemoptysis   ID consulted for further recs: sputum AFBx3    Renal:   No active issues    Endocrine:   #Hyperthyrodism on Methimazole  - TSH, T4, T3 wnl   - AM cortisol decreased, but likely not corrected at right time  - pending TSH receptor ab, TSI, Thyroid U/S per endocrine  - endocrine recs appreciated     GI:   #Constipation   - miralax, senna, will add on dulcolax     ID:   #Fever and hypotension of unclear origin   #Infectious vs non-infectious etiology  RVP and UA negative, CT chest with dependent atelectasis but no apparent consolidation   Empiric coverage with cefepime   Blood/Ucx NGTD  - r/o TB airborne precautions, will clarify with ID if this is still needed  - ID consulted for further recs, unclear if fevers are from infectious or non-infectious etiology  - cefepime for empiric coverage   - of note, also has breast implants which may contribute to inflammation seen on testing  - Serological workup performed including: ESR/CRP (both elevated), HIV (-), RF -, Lyme -, Leptospirosis -, ferritin wnl, LONG pend, ANCA, dsDNA, C3/C4, UP/Cr, Serum ACE, SPEP, Vit D level  - if ID decides this is non-infectious, will consider rheumatological input for autoimmune workup   - pending CTH, CT maxillofacial, and CTAP for further infectious workup  - ID recs appreciated      Prophylactic:   SQH Dvt ppx  Diet: Regular   Pending clinical improvement   Lines: PIVs       Patient requires continuous monitoring with bedside rhythm monitoring, pulse ox monitoring, and intermittent blood gas analysis. Care plan discussed with ICU care team. Patient remained critical and at risk for life threatening decompensation.  Patient seen, examined and plan discussed with CCU team during rounds.     I have personally provided ____ minutes of critical care time excluding time spent on separate procedures, in addition to initial critical care time provided by the CICU Attending, Dr. Hastings       By signing my name below, I, Praful Obando, attest that this documentation has been prepared under the direction and in the presence of Ashley Carpio NP   Electronically signed: Karan Hammonds, 04-18-24 @ 20:12    I, Ashley Carpio, personally performed the services described in this documentation. All medical record entries made by the melanyibharriet were at my direction and in my presence. I have reviewed the chart and agree that the record reflects my personal performance and is accurate and complete  Electronically signed: Ashley Carpio NP        CURT GURROLA  MRN-[id number]  Patient is a 58y old  Female who presents with a chief complaint of Bradycardia (18 Apr 2024 16:08)    HPI:  58-year-old female with a past medical history of hyperthyroidism on methimazole presenting with right-sided chest pain, cough, headache since Friday. Patient also complaining of chills and sweats. Patient had 1 episode of hemoptysis 2 days ago. associated left-sided headache. Patients coworker had COVID last week. EKG in the ED revealed sinus bradycardia with frequent PVCs. Febrile to 101.2 on admission. RVP and UA negative, CTA negative for PE. Also reports taking Methimazole at home without consistent appts or TSH being checked with question of methimazole toxicity. Hypotensive to 80/40 on admission despite 2L of IVF so started on Levophed. EP consulted and said no need for TVP at this time. Admitted to CICU for further workup. (16 Apr 2024 19:11)      Hospital Course:    24 HOUR EVENTS:    REVIEW OF SYSTEMS:    CONSTITUTIONAL: No weakness, fevers or chills  EYES/ENT: No visual changes;  No vertigo or throat pain   NECK: No pain or stiffness  RESPIRATORY: No cough, wheezing, hemoptysis; No shortness of breath  CARDIOVASCULAR: No chest pain or palpitations  GASTROINTESTINAL: No abdominal or epigastric pain. No nausea, vomiting, or hematemesis; No diarrhea or constipation. No melena or hematochezia.  GENITOURINARY: No dysuria, frequency or hematuria  NEUROLOGICAL: No numbness or weakness  SKIN: No itching, rashes      ICU Vital Signs Last 24 Hrs  T(C): 37.1 (18 Apr 2024 19:30), Max: 37.2 (17 Apr 2024 23:00)  T(F): 98.7 (18 Apr 2024 19:30), Max: 99 (17 Apr 2024 23:00)  HR: 65 (18 Apr 2024 20:00) (43 - 115)  BP: 95/46 (18 Apr 2024 19:30) (77/40 - 131/63)  BP(mean): 67 (18 Apr 2024 19:30) (56 - 83)  ABP: --  ABP(mean): --  RR: 39 (18 Apr 2024 20:00) (14 - 48)  SpO2: 98% (18 Apr 2024 20:00) (95% - 99%)    O2 Parameters below as of 18 Apr 2024 19:30  Patient On (Oxygen Delivery Method): room air        I&O's Summary    17 Apr 2024 07:01  -  18 Apr 2024 07:00  --------------------------------------------------------  IN: 1129.2 mL / OUT: 2750 mL / NET: -1620.8 mL    18 Apr 2024 07:01  -  18 Apr 2024 20:12  --------------------------------------------------------  IN: 816.5 mL / OUT: 1000 mL / NET: -183.5 mL    CAPILLARY BLOOD GLUCOSE    CAPILLARY BLOOD GLUCOSE      PHYSICAL EXAM:  GENERAL: No acute distress, well-developed  HEAD:  Atraumatic, Normocephalic  EYES: EOMI, PERRLA, conjunctiva and sclera clear  NECK: Supple, no lymphadenopathy, no JVD  CHEST/LUNG: CTAB; No wheezes, rales, or rhonchi  HEART: Regular rate and rhythm. Normal S1/S2. No murmurs, rubs, or gallops  ABDOMEN: Soft, non-tender, non-distended; normal bowel sounds, no organomegaly  EXTREMITIES:  2+ peripheral pulses b/l, No clubbing, cyanosis, or edema  NEUROLOGY: A&O x 3, no focal deficits  SKIN: No rashes or lesions    ============================I/O===========================   I&O's Detail    17 Apr 2024 07:01  -  18 Apr 2024 07:00  --------------------------------------------------------  IN:    DOPamine Infusion: 99.9 mL    DOPamine Infusion: 166.5 mL    IV PiggyBack: 150 mL    Oral Fluid: 640 mL    Phenylephrine: 72.8 mL  Total IN: 1129.2 mL    OUT:    Voided (mL): 2750 mL  Total OUT: 2750 mL    Total NET: -1620.8 mL      18 Apr 2024 07:01  -  18 Apr 2024 20:12  --------------------------------------------------------  IN:    DOPamine Infusion: 66.5 mL    IV PiggyBack: 50 mL    Oral Fluid: 700 mL  Total IN: 816.5 mL    OUT:    Voided (mL): 1000 mL  Total OUT: 1000 mL    Total NET: -183.5 mL      ============================ LABS =========================                        12.9   4.80  )-----------( 183      ( 18 Apr 2024 00:48 )             38.8     04-18    136  |  101  |  11  ----------------------------<  136<H>  4.3   |  21<L>  |  0.66    Ca    9.3      18 Apr 2024 00:48  Phos  3.9     04-18  Mg     2.0     04-18    TPro  6.4  /  Alb  3.4  /  TBili  0.2  /  DBili  x   /  AST  16  /  ALT  9<L>  /  AlkPhos  91  04-18    Troponin T, High Sensitivity Result: <6 ng/L (04-16-24 @ 14:05)      LIVER FUNCTIONS - ( 18 Apr 2024 00:48 )  Alb: 3.4 g/dL / Pro: 6.4 g/dL / ALK PHOS: 91 U/L / ALT: 9 U/L / AST: 16 U/L / GGT: x           PT/INR - ( 18 Apr 2024 00:48 )   PT: 9.6 sec;   INR: 0.87 ratio         PTT - ( 18 Apr 2024 00:48 )  PTT:48.1 sec    Blood Gas Venous - Lactate: 1.6 mmol/L (04-16-24 @ 10:40)    Urinalysis Basic - ( 18 Apr 2024 00:48 )    Color: x / Appearance: x / SG: x / pH: x  Gluc: 136 mg/dL / Ketone: x  / Bili: x / Urobili: x   Blood: x / Protein: x / Nitrite: x   Leuk Esterase: x / RBC: x / WBC x   Sq Epi: x / Non Sq Epi: x / Bacteria: x    ======================Micro/Rad/Cardio=================  Telemtry: Reviewed   EKG: Reviewed  CXR: Reviewed  Culture: Reviewed   Echo:   Cath:   ======================================================  PAST MEDICAL & SURGICAL HISTORY:  Hyperthyroidism      ====================ASSESSMENT ==============  A/P:58F Kiswahili-speaking Hx hyperthyroidism (on methimazole) who presented to the ED w/ R-sided chest pain, cough, headache since Friday a/w chills and sweats and 1 episode of blood-tinged sputum.  Describes episodes of night sweats for weeks. Persistent hypotension despite 3L IVF in ED now requiring pressor support w/ Levophed--->dopamine. Telemetry w/ sinus bradycardia to 50s w/ significant ectopy (bigemy, trigeminy). R/o TB and fever of unknown origin (infectious vs non-infectious). Transferred to CICU for further care.     Neuro:   Aox3, no active issues    Cardio:   #Sinus bradycardia with frequent PVCs, hypotension   -weaned off dopamine   - TFTs wnl   - Echo EF 63%, septal abnormality, RVF wnl  - Extensive serology ordered to figure out etiology including ESR/CRP (both elevated), HIV (-), RF -, Lyme -, Leptospirosis -, ferritin wnl, LONG pend  - EP: trial of Midodrine 5TID, NPO@MN in case of procedure, attempt to wean dopamine (s/p yu)  - EP recs appreciated    Pulmonary:   # R/o TB  -sputum x1 sent, neg  -Low risk for TB, airborne D/C      Renal:   No active issues    Endocrine:   #Hyperthyrodism on Methimazole  - TSH, T4, T3 wnl   - AM cortisol decreased, but likely not corrected at right time  - pending TSH receptor ab, TSI, Thyroid U/S per endocrine  - endocrine recs appreciated     GI:   #Constipation   - miralax, senna, will add on dulcolax     ID:   #Fever and hypotension of unclear origin   #Infectious vs non-infectious etiology  RVP and UA negative, CT chest with dependent atelectasis but no apparent consolidation   Empiric coverage with cefepime   Blood/Ucx NGTD  - r/o TB airborne precautions, will clarify with ID if this is still needed  - ID consulted for further recs, unclear if fevers are from infectious or non-infectious etiology  - cefepime for empiric coverage   - of note, also has breast implants which may contribute to inflammation seen on testing  - Serological workup performed including: ESR/CRP (both elevated), HIV (-), RF -, Lyme -, Leptospirosis -, ferritin wnl, LONG pend, ANCA, dsDNA, C3/C4, UP/Cr, Serum ACE, SPEP, Vit D level  - if ID decides this is non-infectious, will consider rheumatological input for autoimmune workup   - pending CTH, CT maxillofacial, and CTAP for further infectious workup  - ID recs appreciated      Prophylactic:   SQH Dvt ppx  Diet: Regular   Pending clinical improvement   Lines: PIVs       Patient requires continuous monitoring with bedside rhythm monitoring, pulse ox monitoring, and intermittent blood gas analysis. Care plan discussed with ICU care team. Patient remained critical and at risk for life threatening decompensation.  Patient seen, examined and plan discussed with CCU team during rounds.     I have personally provided __30__ minutes of critical care time excluding time spent on separate procedures, in addition to initial critical care time provided by the CICU Attending, Dr. Hastings       By signing my name below, I, Praful Obando, attest that this documentation has been prepared under the direction and in the presence of Ashley Carpio NP   Electronically signed: GarymeKaran Gonzalez, 04-18-24 @ 20:12    I, Ashley Carpio, personally performed the services described in this documentation. All medical record entries made by the scribe were at my direction and in my presence. I have reviewed the chart and agree that the record reflects my personal performance and is accurate and complete  Electronically signed: Ashley Carpio NP

## 2024-04-18 NOTE — DIETITIAN INITIAL EVALUATION ADULT - NS FNS REASON FOR WEIGHT CHANG
patient reporting having decreased PO PTA for ~1-2 months but feels she actually gained weight during that time with her clothes being more tight fitting, unsure of her UBW and recent BW as she reports not having weighed herself in a long time

## 2024-04-18 NOTE — PROGRESS NOTE ADULT - SUBJECTIVE AND OBJECTIVE BOX
Admitted for: Thyrotoxicosis without thyroid storm        Following for: Hx of graves disease     Subjective:   Croatian translation: self (native Croatian speaker)  Patient expresses that she is not sure why she needs to stay in the hospital for low BP and low HR. This has been a longstanding condition for her.      MEDICATIONS  (STANDING):  cefepime   IVPB      cefepime   IVPB 1000 milliGRAM(s) IV Intermittent every 8 hours  chlorhexidine 2% Cloths 1 Application(s) Topical <User Schedule>  DOPamine Infusion 5 MICROgram(s)/kG/Min (11.1 mL/Hr) IV Continuous <Continuous>  heparin   Injectable 5000 Unit(s) SubCutaneous every 8 hours  midodrine 5 milliGRAM(s) Oral every 8 hours  polyethylene glycol 3350 17 Gram(s) Oral two times a day  senna 2 Tablet(s) Oral at bedtime    MEDICATIONS  (PRN):  bisacodyl 5 milliGRAM(s) Oral every 12 hours PRN Constipation      Allergies    No Known Allergies    Intolerances          PHYSICAL EXAM:  VITALS: T(C): 36.6 (04-18-24 @ 12:00)  T(F): 97.8 (04-18-24 @ 12:00), Max: 99 (04-17-24 @ 23:00)  HR: 49 (04-18-24 @ 16:00) (43 - 115)  BP: 100/51 (04-18-24 @ 16:00) (71/48 - 131/63)  RR:  (14 - 48)  SpO2:  (95% - 99%)  Wt(kg): --  GENERAL: NAD  EYES: + proptosis, no lid lag, anicteric  RESPIRATORY: no respiratory distress  CARDIOVASCULAR: occasional extra heart beats  GI: nondistended  EXT: LOPEZ  PSYCH: Alert and oriented x 3, reactive affect          POCT Blood Glucose.: 98 mg/dL (04-16-24 @ 12:52)      04-18    136  |  101  |  11  ----------------------------<  136<H>  4.3   |  21<L>  |  0.66    eGFR: 100    Ca    9.3      04-18  Mg     2.0     04-18  Phos  3.9     04-18    TPro  6.4  /  Alb  3.4  /  TBili  0.2  /  DBili  x   /  AST  16  /  ALT  9<L>  /  AlkPhos  91  04-18      Thyroid Function Tests:  04-18 @ 00:48 TSH -- FreeT4 1.1 T3 -- Anti TPO -- Anti Thyroglobulin Ab -- TSI --  04-17 @ 02:05 TSH -- FreeT4 -- T3 83 Anti TPO -- Anti Thyroglobulin Ab -- TSI --

## 2024-04-18 NOTE — PROVIDER CONTACT NOTE (OTHER) - SITUATION
Transport came to take patient down for CT scan, Patient refused to go down claiming she doesn't want too much radiation from the test. Pros and cones explained to the patient by THE MD using translat

## 2024-04-18 NOTE — PROGRESS NOTE ADULT - ASSESSMENT
58F w/ Graves disease hyperthyroidism on methimazole presenting with right-sided chest pain, cough, headache, chills/sweats and 1 episode of hemoptysis. Found to be in sinus bradycardia with frequent ectopy, admitted to CICU. Endocrine consulted for hyperthyroidism management    #Graves disease hyperthyroidism  Diagnosed with Graves disease in 2018, also thyroid eye disease (proptosis). Started on methimazole at high dose initially and then subsequently doses were decreased over the years. Now on methimazole 5mg dose every other day, but has been poorly adherent -- taking it 2x/week without recurrent sx. Denies hx of ZHANG. She gets all her medical care once a year in [place]; does not see doctors in [place]    She denies any tremors, heart racing, anxiety, diarrhea, heat intolerance.  She is here for bradycardia with frequent ectopy.    TSH 2.43, TT3 83, TT4 7.6, FT4 1.1 all wnl    Recommendations:  - Hold methimazole for now  - follow up TSH receptor antibodies and TSI. If TSH receptor antibodies/TSI is negative and patient does not develop hyperthyroid sx may be able to stop methimazole  - Follow up Thyroid US  - Will need to establish care with outpatient endocrinology. 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  - Counseled on smoking cessation as this can worsen thyroid eye disease    Discussed with primary team    Roderick Dobbs MD  Endocrine Fellow  Can be reached via teams. For follow up questions, discharge recommendations, or new consults, please call answering service at [phone] (weekdays); [phone] (nights/weekends).

## 2024-04-18 NOTE — DIETITIAN INITIAL EVALUATION ADULT - NS FNS DIET ORDER
Diet, NPO after Midnight:      NPO Start Date: 18-Apr-2024,   NPO Start Time: 23:59  Except Medications (04-18-24 @ 08:57)

## 2024-04-18 NOTE — DIETITIAN INITIAL EVALUATION ADULT - ADD RECOMMEND
1. continue current diet as tolerated of: regular diet  2. encourage PO intake, protein source with each meal as tolerated  3. patient amenable to trial of smoothie of the day 1x/day for extra caloric/protein intake  4. patient reports self-limiting liquid and solid food intake 2/2 not able to ambulate to restroom yet (2/2 current medical status), reports being uncomfortable with using commode/bed pan; when patient status allows to ambulate to restroom patient likely to not self-limit her PO intake and be more comfortable per the patient  5. monitor PO intake, weight trend, electrolytes, blood glucose levels, labs, BMs

## 2024-04-18 NOTE — PROGRESS NOTE ADULT - ASSESSMENT
A/P:58F Estonian-speaking Hx hyperthyroidism (on methimazole) who presented to the ED w/ R-sided chest pain, cough, headache since Friday a/w chills and sweats and 1 episode of blood-tinged sputum.  Describes episodes of night sweats for weeks. Persistent hypotension despite 3L IVF in ED now requiring pressor support w/ Levophed--->dopamine. Telemetry w/ sinus bradycardia to 50s w/ significant ectopy (bigemy, trigeminy). Transferred to CICU for further care.     Neuro:   Aox3, no active issues    Cardio:   #Sinus bradycardia with frequent PVCs, hypotension   - hypotension multifactorial d/d includes medication-induced (methimazole?) vs. sepsis vs other etiology  - currently on dopamine gtt  - TFTs wnl   - Echo with normal EF, official read pending  - f/u addnl serology including Lyme, ESR, CRP, blood/ucx    Pulmonary:   # R/o TB  airborne precautions for now  Not producing sputum currently, hx of fevers, night sweats, episode of hemoptysis   ID consulted for further recs    Renal:   No active issues    Endocrine:   #Hyperthyrodism on Methimazole  - TSH, T4, T3 wnl   - rest as above  - AM cortisol pending     ID:   #Fever and hypotension  RVP and UA negative, CT chest with dependent atelectasis but no apparent consolidation   Empiric coverage with cefepime   Blood/Ucx  - f/u addnl serology including Lyme, ESR, CRP, blood/ucx  - r/o TB airborne precautions  - ID consulted for further recs, will also consider non-infectious causes     Prophylactic:   SQH Dvt ppx  Diet: Regular   Pending clinical improvement   Lines: PIVs    A/P:58F Bulgarian-speaking Hx hyperthyroidism (on methimazole) who presented to the ED w/ R-sided chest pain, cough, headache since Friday a/w chills and sweats and 1 episode of blood-tinged sputum.  Describes episodes of night sweats for weeks. Persistent hypotension despite 3L IVF in ED now requiring pressor support w/ Levophed--->dopamine. Telemetry w/ sinus bradycardia to 50s w/ significant ectopy (bigemy, trigeminy). R/o TB and fever of unknown origin (infectious vs non-infectious). Transferred to CICU for further care.     Neuro:   Aox3, no active issues    Cardio:   #Sinus bradycardia with frequent PVCs, hypotension   - currently on dopamine gtt  - TFTs wnl   - Echo EF 63%, septal abnormality, RVF wnl  - Extensive serology ordered to figure out etiology including ESR/CRP (both elevated), HIV (-), RF -, Lyme -, Leptospirosis -, ferritin wnl, LONG pend  - EP: trial of Midodrine 5TID, NPO@MN in case of procedure, attempt to wean dopamine (s/p yu)  - EP recs appreciated    Pulmonary:   # R/o TB  airborne precautions for now  Not producing sputum currently, hx of fevers, night sweats, episode of hemoptysis   ID consulted for further recs: sputum AFBx3    Renal:   No active issues    Endocrine:   #Hyperthyrodism on Methimazole  - TSH, T4, T3 wnl   - AM cortisol decreased, but likely not corrected at right time  - pending TSH receptor ab, TSI, Thyroid U/S per endocrine  - endocrine recs appreciated     GI:   #Constipation   - miralax, senna, will add on dulcolax     ID:   #Fever and hypotension  RVP and UA negative, CT chest with dependent atelectasis but no apparent consolidation   Empiric coverage with cefepime   Blood/Ucx NGTD  - r/o TB airborne precautions  - ID consulted for further recs, unclear if fevers are from infectious or non-infectious etiology  - cefepime for empiric coverage   - pending CTH, CT maxillofacial, and CTAP for further infectious workup  - ID recs appreciated      Prophylactic:   SQH Dvt ppx  Diet: Regular   Pending clinical improvement   Lines: PIVs    A/P:58F Yoruba-speaking Hx hyperthyroidism (on methimazole) who presented to the ED w/ R-sided chest pain, cough, headache since Friday a/w chills and sweats and 1 episode of blood-tinged sputum.  Describes episodes of night sweats for weeks. Persistent hypotension despite 3L IVF in ED now requiring pressor support w/ Levophed--->dopamine. Telemetry w/ sinus bradycardia to 50s w/ significant ectopy (bigemy, trigeminy). R/o TB and fever of unknown origin (infectious vs non-infectious). Transferred to CICU for further care.     Neuro:   Aox3, no active issues    Cardio:   #Sinus bradycardia with frequent PVCs, hypotension   - currently on dopamine gtt  - TFTs wnl   - Echo EF 63%, septal abnormality, RVF wnl  - Extensive serology ordered to figure out etiology including ESR/CRP (both elevated), HIV (-), RF -, Lyme -, Leptospirosis -, ferritin wnl, LONG pend  - EP: trial of Midodrine 5TID, NPO@MN in case of procedure, attempt to wean dopamine (s/p yu)  - EP recs appreciated    Pulmonary:   # R/o TB  airborne precautions for now  Not producing sputum currently, hx of fevers, night sweats, episode of hemoptysis   ID consulted for further recs: sputum AFBx3    Renal:   No active issues    Endocrine:   #Hyperthyrodism on Methimazole  - TSH, T4, T3 wnl   - AM cortisol decreased, but likely not corrected at right time  - pending TSH receptor ab, TSI, Thyroid U/S per endocrine  - endocrine recs appreciated     GI:   #Constipation   - miralax, senna, will add on dulcolax     ID:   #Fever and hypotension  #Breast implants   RVP and UA negative, CT chest with dependent atelectasis but no apparent consolidation   Empiric coverage with cefepime   Blood/Ucx NGTD  - r/o TB airborne precautions  - ID consulted for further recs, unclear if fevers are from infectious or non-infectious etiology  - cefepime for empiric coverage   - of note, also has breast implants   - pending CTH, CT maxillofacial, and CTAP for further infectious workup  - ID recs appreciated      Prophylactic:   SQH Dvt ppx  Diet: Regular   Pending clinical improvement   Lines: PIVs    A/P:58F Azeri-speaking Hx hyperthyroidism (on methimazole) who presented to the ED w/ R-sided chest pain, cough, headache since Friday a/w chills and sweats and 1 episode of blood-tinged sputum.  Describes episodes of night sweats for weeks. Persistent hypotension despite 3L IVF in ED now requiring pressor support w/ Levophed--->dopamine. Telemetry w/ sinus bradycardia to 50s w/ significant ectopy (bigemy, trigeminy). R/o TB and fever of unknown origin (infectious vs non-infectious). Transferred to CICU for further care.     Neuro:   Aox3, no active issues    Cardio:   #Sinus bradycardia with frequent PVCs, hypotension   - currently on dopamine gtt  - TFTs wnl   - Echo EF 63%, septal abnormality, RVF wnl  - Extensive serology ordered to figure out etiology including ESR/CRP (both elevated), HIV (-), RF -, Lyme -, Leptospirosis -, ferritin wnl, LONG pend  - EP: trial of Midodrine 5TID, NPO@MN in case of procedure, attempt to wean dopamine (s/p yu)  - EP recs appreciated    Pulmonary:   # R/o TB  airborne precautions for now  Not producing sputum currently, hx of fevers, night sweats, episode of hemoptysis   ID consulted for further recs: sputum AFBx3    Renal:   No active issues    Endocrine:   #Hyperthyrodism on Methimazole  - TSH, T4, T3 wnl   - AM cortisol decreased, but likely not corrected at right time  - pending TSH receptor ab, TSI, Thyroid U/S per endocrine  - endocrine recs appreciated     GI:   #Constipation   - miralax, senna, will add on dulcolax     ID:   #Fever and hypotension  #Breast implants   RVP and UA negative, CT chest with dependent atelectasis but no apparent consolidation   Empiric coverage with cefepime   Blood/Ucx NGTD  - r/o TB airborne precautions  - ID consulted for further recs, unclear if fevers are from infectious or non-infectious etiology  - cefepime for empiric coverage   - of note, also has breast implants   - Serological workup performed including: ESR/CRP (both elevated), HIV (-), RF -, Lyme -, Leptospirosis -, ferritin wnl, LONG pend, ANCA, dsDNA, C3/C4, UP/Cr, Serum ACE, SPEP, Vit D level  - pending CTH, CT maxillofacial, and CTAP for further infectious workup  - ID recs appreciated      Prophylactic:   SQH Dvt ppx  Diet: Regular   Pending clinical improvement   Lines: PIVs    A/P:58F Azeri-speaking Hx hyperthyroidism (on methimazole) who presented to the ED w/ R-sided chest pain, cough, headache since Friday a/w chills and sweats and 1 episode of blood-tinged sputum.  Describes episodes of night sweats for weeks. Persistent hypotension despite 3L IVF in ED now requiring pressor support w/ Levophed--->dopamine. Telemetry w/ sinus bradycardia to 50s w/ significant ectopy (bigemy, trigeminy). R/o TB and fever of unknown origin (infectious vs non-infectious). Transferred to CICU for further care.     Neuro:   Aox3, no active issues    Cardio:   #Sinus bradycardia with frequent PVCs, hypotension   - currently on dopamine gtt  - TFTs wnl   - Echo EF 63%, septal abnormality, RVF wnl  - Extensive serology ordered to figure out etiology including ESR/CRP (both elevated), HIV (-), RF -, Lyme -, Leptospirosis -, ferritin wnl, LONG pend  - EP: trial of Midodrine 5TID, NPO@MN in case of procedure, attempt to wean dopamine (s/p yu)  - EP recs appreciated    Pulmonary:   # R/o TB  airborne precautions for now  Not producing sputum currently, hx of fevers, night sweats, episode of hemoptysis   ID consulted for further recs: sputum AFBx3    Renal:   No active issues    Endocrine:   #Hyperthyrodism on Methimazole  - TSH, T4, T3 wnl   - AM cortisol decreased, but likely not corrected at right time  - pending TSH receptor ab, TSI, Thyroid U/S per endocrine  - endocrine recs appreciated     GI:   #Constipation   - miralax, senna, will add on dulcolax     ID:   #Fever and hypotension of unclear origin   #Infectious vs non-infectious etiology  RVP and UA negative, CT chest with dependent atelectasis but no apparent consolidation   Empiric coverage with cefepime   Blood/Ucx NGTD  - r/o TB airborne precautions, will clarify with ID if this is still needed  - ID consulted for further recs, unclear if fevers are from infectious or non-infectious etiology  - cefepime for empiric coverage   - of note, also has breast implants which may contribute to inflammation seen on testing  - Serological workup performed including: ESR/CRP (both elevated), HIV (-), RF -, Lyme -, Leptospirosis -, ferritin wnl, LONG pend, ANCA, dsDNA, C3/C4, UP/Cr, Serum ACE, SPEP, Vit D level  - if ID decides this is non-infectious, will consider rheumatological input for autoimmune workup   - pending CTH, CT maxillofacial, and CTAP for further infectious workup  - ID recs appreciated      Prophylactic:   SQH Dvt ppx  Diet: Regular   Pending clinical improvement   Lines: PIVs

## 2024-04-18 NOTE — DIETITIAN INITIAL EVALUATION ADULT - REASON INDICATOR FOR ASSESSMENT
ICU length of stay  Source: chart, RN, patient, French  Josiah #[id number] for French translation  Chart reviewed, events noted

## 2024-04-18 NOTE — DIETITIAN INITIAL EVALUATION ADULT - ORAL INTAKE PTA/DIET HISTORY
Patient reports she had a history of not eating like her normal self for ~1-2 months PTA. Denied chewing/swallowing impairment or nausea/vomiting. Despite this, patient reports she has not weighed herself in quite some time but feels that she had actually gained weight with her clothes being a bit more tight fitting in recent weeks. Normally consumes a healthful diet.

## 2024-04-18 NOTE — DIETITIAN INITIAL EVALUATION ADULT - REASON FOR ADMISSION
Thyrotoxicosis without thyroid storm    Per chart, patient is a 57 y/o female with PMH of hyperthyroidism. Patient presents to [facility] w/ R sided chest pain, cough, headache, chills, sweats, one episode of hemoptysis 2 days PTA. Started on pressor support for hypotension, telemetry w/ bradycardia w/ significant ectopy (bigemy, trigeminy) c/f possible 2nd degree type II block per MD.

## 2024-04-18 NOTE — CHART NOTE - NSCHARTNOTEFT_GEN_A_CORE
----- Message from Sejal Edwards CNP sent at 10/10/2022  3:33 PM CDT -----  Stool is negative for infectious process but does demonstrate elevations in fecal calprotectin and white blood cell count consistent with inflammatory bowel disease. Continue with plan of care for ASAP transfer of care referral to GI, low residue bland diet with at least 1 electrolytes supplement per day.  Recommend over-the-counter Imodium 2 mg tablet/capsule, 2 capsules by mouth with for stool and may repeat 1-2 times for each loose stool after.    Please contact patient with results and recommendations.   Discussed with her importance of CT scan with discussion of risks and benefits. Ms. Muniz states that since she had this "irregular heart rhythm" her whole life and no identifiable cause was discovered, she does not want CT scans at this time. Reported that we will rearrange the CT scans for tomorrow. She said she will take time to think about it and let us know if she changes her mind.

## 2024-04-18 NOTE — DIETITIAN INITIAL EVALUATION ADULT - NUTRITIONGOAL OUTCOME1
- patient will consume >75% of meals during hospital admission to help adequately meet nutritional needs and patient being able to comfortably use restroom when it is safe to ambulate pending improvement in clinical status to make using restroom more comfortable for the patient to allow her to increase her liquid/solid food intake

## 2024-04-18 NOTE — DIETITIAN INITIAL EVALUATION ADULT - ENERGY INTAKE
Poor (<50%) Patient currently reporting poor PO intake 2/2 concern for needing to urinate/pass BM in bed instead of in the bathroom which makes her uncomfortable, not able to ambulate to bathroom yet due to BP status per d/w RN. As a result, patient is self restricting how much she will drink and eat. Patient amenable to try smoothie of the day with meals, reviewed meal ordering process.

## 2024-04-18 NOTE — PROGRESS NOTE ADULT - SUBJECTIVE AND OBJECTIVE BOX
Patient is a 58y old  Female who presents with a chief complaint of Thyrotoxicosis without thyroid storm    Per chart, patient is a 57 y/o female with PMH of hyperthyroidism. Patient presents to [facility] w/ R sided chest pain, cough, headache, chills, sweats, one episode of hemoptysis 2 days PTA. Started on pressor support for hypotension, telemetry w/ bradycardia w/ significant ectopy (bigemy, trigeminy) c/f possible 2nd degree type II block per MD. (18 Apr 2024 11:45)    Being followed by ID for        Interval history:  No other acute events      ROS:  No cough,SOB,CP  No N/V/D  No abd pain  No urinary complaints  No HA  No joint or limb pain  No other complaints    PAST MEDICAL & SURGICAL HISTORY:  Hyperthyroidism        Allergies    No Known Allergies    Intolerances      Antimicrobials:    cefepime   IVPB 1000 milliGRAM(s) IV Intermittent every 8 hours  cefepime   IVPB        MEDICATIONS  (STANDING):  cefepime   IVPB 1000 milliGRAM(s) IV Intermittent every 8 hours  cefepime   IVPB      chlorhexidine 2% Cloths 1 Application(s) Topical <User Schedule>  DOPamine Infusion 5 MICROgram(s)/kG/Min (11.1 mL/Hr) IV Continuous <Continuous>  heparin   Injectable 5000 Unit(s) SubCutaneous every 8 hours  midodrine 5 milliGRAM(s) Oral every 8 hours  polyethylene glycol 3350 17 Gram(s) Oral two times a day  senna 2 Tablet(s) Oral at bedtime      Vital Signs Last 24 Hrs  T(C): 36.6 (04-18-24 @ 12:00), Max: 37.2 (04-17-24 @ 19:15)  T(F): 97.8 (04-18-24 @ 12:00), Max: 99 (04-17-24 @ 23:00)  HR: 69 (04-18-24 @ 12:30) (43 - 115)  BP: 119/57 (04-18-24 @ 12:30) (71/48 - 131/63)  BP(mean): 82 (04-18-24 @ 12:30) (53 - 83)  RR: 20 (04-18-24 @ 12:30) (14 - 48)  SpO2: 96% (04-18-24 @ 12:30) (95% - 99%)    Physical Exam:    Constitutional well preserved,comfortable,pleasant    HEENT PERRLA EOMI,No pallor or icterus    No oral exudate or erythema    Neck supple no JVD or LN    Chest Good AE,CTA    CVS RRR S1 S2 WNl No murmur or rub or gallop    Abd soft BS normal No tenderness no masses    Ext No cyanosis clubbing or edema    IV site no erythema tenderness or discharge    Joints no swelling or LOM    CNS AAO X 3 no focal    Lab Data:                          12.9   4.80  )-----------( 183      ( 18 Apr 2024 00:48 )             38.8       04-18    136  |  101  |  11  ----------------------------<  136<H>  4.3   |  21<L>  |  0.66    Ca    9.3      18 Apr 2024 00:48  Phos  3.9     04-18  Mg     2.0     04-18    TPro  6.4  /  Alb  3.4  /  TBili  0.2  /  DBili  x   /  AST  16  /  ALT  9<L>  /  AlkPhos  91  04-18    Thyroid Stimulating Hormone, Serum (04.16.24 @ 14:05)    Thyroid Stimulating Hormone, Serum: 2.43 uIU/mL    Rheumatoid Factor Quant, Serum or Plasma in AM (04.18.24 @ 00:48)    Rheumatoid Factor Quant, Serum or Plasma: 10 IU/mL        .Sputum Sputum  04-17-24 --  --  --      Clean Catch Clean Catch (Midstream)  04-16-24   <10,000 CFU/mL Normal Urogenital Elaine  --  --      .Blood Blood-Peripheral  04-16-24   No growth at 24 hours  --  --      .Blood Blood-Peripheral  04-16-24   No growth at 24 hours  --  --      < from: TTE W or WO Ultrasound Enhancing Agent (04.17.24 @ 08:25) >  NCLUSIONS:      1. Left ventricular cavity is mildly dilated. Left ventricular wall thickness is normal. Abnormal (paradoxical) septal motion consistent with conduction delay. Left ventricular systolic function is normal with an ejection fraction of 63 % by Lema's method of disks. There are no regional wall motion abnormalities seen.   2. Entire inferior wall and basal and mid inferior septum are abnormal.   3. Normal left ventricular diastolic function, with normal filling pressure.   4. Normal right ventricular cavity size and normal systolic function.   5. Normal left and right atrial size.   6. No significant valvular disease.   7. Estimated pulmonary artery systolic pressure is 16 mmHg.   8. No pericardial effusion seen.   9. No prior echocardiogram is available for comparison.    __________________________________________________________________    < end of copied text >      < from: CT Angio Chest PE Protocol w/ IV Cont (04.16.24 @ 14:33) >    ACC: [id number] EXAM:  CT ANGIO CHEST PULM ART [facility]   ORDERED BY: DIANELYS MATHIAS     PROCEDURE DATE:  04/16/2024          INTERPRETATION:  CLINICAL INFORMATION: Chest pain    COMPARISON: Chest x-ray 4/16/2024    CONTRAST/COMPLICATIONS:  IV Contrast: Omnipaque 350  70 cc administered   30 cc discarded  Oral Contrast: NONE  Complications: None reported at time of study completion    PROCEDURE:  CT Angiography of the Chest.  Sagittal and coronal reformats were performed as well as 3D (MIP)   reconstructions.    FINDINGS:    LUNGS AND AIRWAYS: Patent central airways.  Dependent atelectasis.  PLEURA: No pleural effusion.  MEDIASTINUM AND ALANA: No lymphadenopathy.  VESSELS: No pulmonary embolus  HEART: Heart size is normal. No pericardial effusion.  CHEST WALL AND LOWER NECK: Bilateral breast implants  VISUALIZED UPPER ABDOMEN: Within normal limits.  BONES: Within normal limits.    IMPRESSION:  No pulmonary embolus        --- End of Report ---    < end of copied text >            WBC Count: 4.80 (04-18-24 @ 00:48)  WBC Count: 5.05 (04-17-24 @ 02:05)  WBC Count: 6.32 (04-16-24 @ 10:40)       Bilirubin Total: 0.2 mg/dL (04-18-24 @ 00:48)  Aspartate Aminotransferase (AST/SGOT): 16 U/L (04-18-24 @ 00:48)  Alanine Aminotransferase (ALT/SGPT): 9 U/L (04-18-24 @ 00:48)  Alkaline Phosphatase: 91 U/L (04-18-24 @ 00:48)           Patient is a 58y old  Female who presents with a chief complaint of Thyrotoxicosis without thyroid storm    Per chart, patient is a 59 y/o female with PMH of hyperthyroidism. Patient presents to [facility] w/ R sided chest pain, cough, headache, chills, sweats, one episode of hemoptysis 2 days PTA. Started on pressor support for hypotension, telemetry w/ bradycardia w/ significant ectopy (bigemy, trigeminy) c/f possible 2nd degree type II block per MD. (18 Apr 2024 11:45)    Being followed by ID for fever        Interval history:  spoke to patient with  #[id number]  pt claims that came in friday as had been coughing ad had a headache for a few days  pt denies hemoptysis , pt claims had brushed teeth and gums were bleeding so there was some blood at one point  cough was bronchitic   no chest pain  pt doesn't understand why she is in an ICU  not achey  no known exposure to MT[place]  living in the [place] since [place]  no diarrhea  headache is in the back of her head   no meningismus  No other acute events      PAST MEDICAL & SURGICAL HISTORY:  Hyperthyroidism        Allergies    No Known Allergies    Intolerances      Antimicrobials:    cefepime   IVPB 1000 milliGRAM(s) IV Intermittent every 8 hours  cefepime   IVPB        MEDICATIONS  (STANDING):  cefepime   IVPB 1000 milliGRAM(s) IV Intermittent every 8 hours  cefepime   IVPB      chlorhexidine 2% Cloths 1 Application(s) Topical <User Schedule>  DOPamine Infusion 5 MICROgram(s)/kG/Min (11.1 mL/Hr) IV Continuous <Continuous>  heparin   Injectable 5000 Unit(s) SubCutaneous every 8 hours  midodrine 5 milliGRAM(s) Oral every 8 hours  polyethylene glycol 3350 17 Gram(s) Oral two times a day  senna 2 Tablet(s) Oral at bedtime      Vital Signs Last 24 Hrs  T(C): 36.6 (04-18-24 @ 12:00), Max: 37.2 (04-17-24 @ 19:15)  T(F): 97.8 (04-18-24 @ 12:00), Max: 99 (04-17-24 @ 23:00)  HR: 69 (04-18-24 @ 12:30) (43 - 115)  BP: 119/57 (04-18-24 @ 12:30) (71/48 - 131/63)  BP(mean): 82 (04-18-24 @ 12:30) (53 - 83)  RR: 20 (04-18-24 @ 12:30) (14 - 48)  SpO2: 96% (04-18-24 @ 12:30) (95% - 99%)    Physical Exam:    Constitutional  resting quietly    HEENT PERRLA EOMI,No pallor or icterus    No oral exudate or erythema    Neck supple no JVD or LN    Chest Good AE,CTA    CVS  S1 S2     Abd soft BS normal No tenderness     Ext No cyanosis clubbing or edema    IV site no erythema tenderness or discharge    Joints no swelling or LOM    CNS AAO X 3 no focal    Lab Data:                          12.9   4.80  )-----------( 183      ( 18 Apr 2024 00:48 )             38.8     Complete Blood Count + Automated Diff (04.18.24 @ 00:48)    WBC Count: 4.80 K/uL   RBC Count: 4.15 M/uL   Hemoglobin: 12.9 g/dL   Hematocrit: 38.8 %   Mean Cell Volume: 93.5 fl   Mean Cell Hemoglobin: 31.1 pg   Mean Cell Hemoglobin Conc: 33.2 gm/dL   Red Cell Distrib Width: 12.5 %   Platelet Count - Automated: 183 K/uL   Auto Neutrophil #: 2.27 K/uL   Auto Lymphocyte #: 2.01 K/uL   Auto Monocyte #: 0.17 K/uL   Auto Eosinophil #: 0.09 K/uL   Auto Basophil #: 0.18 K/uL   Auto Neutrophil %: 47.3: Differential percentages must be correlated with absolute numbers for  clinical significance. %   Auto Lymphocyte %: 41.8 %   Auto Monocyte %: 3.6 %   Auto Eosinophil %: 1.8 %   Auto Basophil %: 3.7 %      04-18    136  |  101  |  11  ----------------------------<  136<H>  4.3   |  21<L>  |  0.66    Ca    9.3      18 Apr 2024 00:48  Phos  3.9     04-18  Mg     2.0     04-18    TPro  6.4  /  Alb  3.4  /  TBili  0.2  /  DBili  x   /  AST  16  /  ALT  9<L>  /  AlkPhos  91  04-18    Thyroid Stimulating Hormone, Serum (04.16.24 @ 14:05)    Thyroid Stimulating Hormone, Serum: 2.43 uIU/mL    Rheumatoid Factor Quant, Serum or Plasma in AM (04.18.24 @ 00:48)    Rheumatoid Factor Quant, Serum or Plasma: 10 IU/mL        .Sputum Sputum  04-17-24 --  --  --      Clean Catch Clean Catch (Midstream)  04-16-24   <10,000 CFU/mL Normal Urogenital Elaine  --  --      .Blood Blood-Peripheral  04-16-24   No growth at 24 hours  --  --      .Blood Blood-Peripheral  04-16-24   No growth at 24 hours  --  --      < from: TTE W or WO Ultrasound Enhancing Agent (04.17.24 @ 08:25) >  NCLUSIONS:      1. Left ventricular cavity is mildly dilated. Left ventricular wall thickness is normal. Abnormal (paradoxical) septal motion consistent with conduction delay. Left ventricular systolic function is normal with an ejection fraction of 63 % by Lema's method of disks. There are no regional wall motion abnormalities seen.   2. Entire inferior wall and basal and mid inferior septum are abnormal.   3. Normal left ventricular diastolic function, with normal filling pressure.   4. Normal right ventricular cavity size and normal systolic function.   5. Normal left and right atrial size.   6. No significant valvular disease.   7. Estimated pulmonary artery systolic pressure is 16 mmHg.   8. No pericardial effusion seen.   9. No prior echocardiogram is available for comparison.    __________________________________________________________________    < end of copied text >      < from: CT Angio Chest PE Protocol w/ IV Cont (04.16.24 @ 14:33) >    ACC: [id number] EXAM:  CT ANGIO CHEST PULM ART [facility]   ORDERED BY: DIANELYS MATHIAS     PROCEDURE DATE:  04/16/2024          INTERPRETATION:  CLINICAL INFORMATION: Chest pain    COMPARISON: Chest x-ray 4/16/2024    CONTRAST/COMPLICATIONS:  IV Contrast: Omnipaque 350  70 cc administered   30 cc discarded  Oral Contrast: NONE  Complications: None reported at time of study completion    PROCEDURE:  CT Angiography of the Chest.  Sagittal and coronal reformats were performed as well as 3D (MIP)   reconstructions.    FINDINGS:    LUNGS AND AIRWAYS: Patent central airways.  Dependent atelectasis.  PLEURA: No pleural effusion.  MEDIASTINUM AND ALANA: No lymphadenopathy.  VESSELS: No pulmonary embolus  HEART: Heart size is normal. No pericardial effusion.  CHEST WALL AND LOWER NECK: Bilateral breast implants  VISUALIZED UPPER ABDOMEN: Within normal limits.  BONES: Within normal limits.    IMPRESSION:  No pulmonary embolus        --- End of Report ---    < end of copied text >      Borrelia burgdorferi IgG/IgM Antibodies (04.17.24 @ 13:10)    LYME IgG/IgM Antibodies Result: 0.18 Index   Lyme C6 Interpretation: Negative: A negative result may occur in patients recently (< 14 days) infected  with Borrelia burgdorferi. If early Lyme disease is suspected, consider  collecting a new sample 2 - 4 weeks later for repeat testing.  Reference Range: (expressed as Index values)  < 0.90             Negative  0.90 - 1.09      Equivocal  >= 1.10           Positive  CDC/ASTPHLD Guidelines recommend that all samples judged equivocal or  positive be re-tested by confirmatory immunoassays.  Method: Chemiluminescent Immunoassay          WBC Count: 4.80 (04-18-24 @ 00:48)  WBC Count: 5.05 (04-17-24 @ 02:05)  WBC Count: 6.32 (04-16-24 @ 10:40)       Bilirubin Total: 0.2 mg/dL (04-18-24 @ 00:48)  Aspartate Aminotransferase (AST/SGOT): 16 U/L (04-18-24 @ 00:48)  Alanine Aminotransferase (ALT/SGPT): 9 U/L (04-18-24 @ 00:48)  Alkaline Phosphatase: 91 U/L (04-18-24 @ 00:48)

## 2024-04-18 NOTE — DIETITIAN INITIAL EVALUATION ADULT - FACTORS AFF FOOD INTAKE
acute illness, concern for needing to urinate/pass BM in bed instead of in the bathroom which makes her uncomfortable, not able to ambulate to bathroom yet due to BP status per d/w RN

## 2024-04-18 NOTE — PROGRESS NOTE ADULT - SUBJECTIVE AND OBJECTIVE BOX
Patient is a 58y old  Female who presents with a chief complaint of Bradycardia (2024 19:33)    HPI:  58-year-old female with a past medical history of hyperthyroidism on methimazole presenting with right-sided chest pain, cough, headache since Friday. Patient also complaining of chills and sweats. Patient had 1 episode of hemoptysis 2 days ago. associated left-sided headache. Patients coworker had COVID last week. EKG in the ED revealed sinus bradycardia with frequent PVCs. Febrile to 101.2 on admission. RVP and UA negative, CTA negative for PE. Also reports taking Methimazole at home without consistent appts or TSH being checked with question of methimazole toxicity. Hypotensive to 80/40 on admission despite 2L of IVF so started on Levophed. EP consulted and said no need for TVP at this time. Admitted to CICU for further workup. (2024 19:11)       INTERVAL HPI/OVERNIGHT EVENTS:   No overnight events   Afebrile, hemodynamically stable     Subjective:    ICU Vital Signs Last 24 Hrs  T(C): 36.8 (2024 03:00), Max: 37.2 (2024 19:15)  T(F): 98.3 (2024 03:00), Max: 99 (2024 23:00)  HR: 59 (2024 07:00) (43 - 114)  BP: 96/53 (2024 07:00) (71/48 - 121/57)  BP(mean): 70 (2024 07:00) (53 - 82)  ABP: --  ABP(mean): --  RR: 16 (2024 07:00) (14 - 48)  SpO2: 96% (2024 07:00) (95% - 99%)    O2 Parameters below as of 2024 07:00  Patient On (Oxygen Delivery Method): room air          I&O's Summary    2024 07:01  -  2024 07:00  --------------------------------------------------------  IN: 1118.1 mL / OUT: 2750 mL / NET: -1631.9 mL          Daily     Daily Weight in k.3 (2024 05:15)    Adult Advanced Hemodynamics Last 24 Hrs  CVP(mm Hg): --  CVP(cm H2O): --  CO: --  CI: --  PA: --  PA(mean): --  PCWP: --  SVR: --  SVRI: --  PVR: --  PVRI: --    EKG/Telemetry Events:    MEDICATIONS  (STANDING):  cefepime   IVPB      cefepime   IVPB 1000 milliGRAM(s) IV Intermittent every 8 hours  chlorhexidine 2% Cloths 1 Application(s) Topical <User Schedule>  DOPamine Infusion 5 MICROgram(s)/kG/Min (11.1 mL/Hr) IV Continuous <Continuous>  heparin   Injectable 5000 Unit(s) SubCutaneous every 8 hours  phenylephrine    Infusion 0.1 MICROgram(s)/kG/Min (2.21 mL/Hr) IV Continuous <Continuous>  polyethylene glycol 3350 17 Gram(s) Oral daily  senna 2 Tablet(s) Oral at bedtime    MEDICATIONS  (PRN):      PHYSICAL EXAM:  GENERAL:   HEAD:  Atraumatic, Normocephalic  EYES: EOMI, PERRLA, conjunctiva and sclera clear  NECK: Supple, No JVD, Normal thyroid, no enlarged nodes  NERVOUS SYSTEM:  Alert & Awake.   CHEST/LUNG: B/L good air entry; No rales, rhonchi, or wheezing  HEART: S1S2 normal, no S3, Regular rate and rhythm; No murmurs  ABDOMEN: Soft, Nontender, Nondistended; Bowel sounds present  EXTREMITIES:  2+ Peripheral Pulses, No clubbing, cyanosis, or edema  LYMPH: No lymphadenopathy noted  SKIN: No rashes or lesions    LABS:                        12.9   4.80  )-----------( 183      ( 2024 00:48 )             38.8     04-18    136  |  101  |  11  ----------------------------<  136<H>  4.3   |  21<L>  |  0.66    Ca    9.3      2024 00:48  Phos  3.9     04-18  Mg     2.0     04-18    TPro  6.4  /  Alb  3.4  /  TBili  0.2  /  DBili  x   /  AST  16  /  ALT  9<L>  /  AlkPhos  91  04-18    LIVER FUNCTIONS - ( 2024 00:48 )  Alb: 3.4 g/dL / Pro: 6.4 g/dL / ALK PHOS: 91 U/L / ALT: 9 U/L / AST: 16 U/L / GGT: x           PT/INR - ( 2024 00:48 )   PT: 9.6 sec;   INR: 0.87 ratio         PTT - ( 2024 00:48 )  PTT:48.1 sec  CAPILLARY BLOOD GLUCOSE                Urinalysis Basic - ( 2024 00:48 )    Color: x / Appearance: x / SG: x / pH: x  Gluc: 136 mg/dL / Ketone: x  / Bili: x / Urobili: x   Blood: x / Protein: x / Nitrite: x   Leuk Esterase: x / RBC: x / WBC x   Sq Epi: x / Non Sq Epi: x / Bacteria: x          RADIOLOGY & ADDITIONAL TESTS:  CXR:        Care Discussed with Consultants/Other Providers [ x] YES  [ ] NO           Patient is a 58y old  Female who presents with a chief complaint of Bradycardia (2024 19:33)    HPI:  58-year-old female with a past medical history of hyperthyroidism on methimazole presenting with right-sided chest pain, cough, headache since Friday. Patient also complaining of chills and sweats. Patient had 1 episode of hemoptysis 2 days ago. associated left-sided headache. Patients coworker had COVID last week. EKG in the ED revealed sinus bradycardia with frequent PVCs. Febrile to 101.2 on admission. RVP and UA negative, CTA negative for PE. Also reports taking Methimazole at home without consistent appts or TSH being checked with question of methimazole toxicity. Hypotensive to 80/40 on admission despite 2L of IVF so started on Levophed. EP consulted and said no need for TVP at this time. Admitted to CICU for further workup. (2024 19:11)       INTERVAL HPI/OVERNIGHT EVENTS:   NAEON  Afebrile  Daughter present at bedside and provided translation. Denies cp, sob, dizziness, lightheadness. Reports night sweats last night. Denies hemoptysis in AM    Subjective:    ICU Vital Signs Last 24 Hrs  T(C): 36.8 (2024 03:00), Max: 37.2 (2024 19:15)  T(F): 98.3 (2024 03:00), Max: 99 (2024 23:00)  HR: 59 (2024 07:00) (43 - 114)  BP: 96/53 (2024 07:00) (71/48 - 121/57)  BP(mean): 70 (2024 07:00) (53 - 82)  ABP: --  ABP(mean): --  RR: 16 (2024 07:00) (14 - 48)  SpO2: 96% (2024 07:00) (95% - 99%)    O2 Parameters below as of 2024 07:00  Patient On (Oxygen Delivery Method): room air          I&O's Summary    2024 07:01  -  2024 07:00  --------------------------------------------------------  IN: 1118.1 mL / OUT: 2750 mL / NET: -1631.9 mL          Daily     Daily Weight in k.3 (2024 05:15)    Adult Advanced Hemodynamics Last 24 Hrs  CVP(mm Hg): --  CVP(cm H2O): --  CO: --  CI: --  PA: --  PA(mean): --  PCWP: --  SVR: --  SVRI: --  PVR: --  PVRI: --    EKG/Telemetry Events: sinus bradycardia with frequent bigem, trigem    MEDICATIONS  (STANDING):  cefepime   IVPB      cefepime   IVPB 1000 milliGRAM(s) IV Intermittent every 8 hours  chlorhexidine 2% Cloths 1 Application(s) Topical <User Schedule>  DOPamine Infusion 5 MICROgram(s)/kG/Min (11.1 mL/Hr) IV Continuous <Continuous>  heparin   Injectable 5000 Unit(s) SubCutaneous every 8 hours  phenylephrine    Infusion 0.1 MICROgram(s)/kG/Min (2.21 mL/Hr) IV Continuous <Continuous>  polyethylene glycol 3350 17 Gram(s) Oral daily  senna 2 Tablet(s) Oral at bedtime    MEDICATIONS  (PRN):    PHYSICAL EXAM:  GENERAL: No acute distress, well-developed  HEAD:  Atraumatic, Normocephalic  EYES: EOMI, PERRLA  CHEST/LUNG: CTAB; No wheezes, rales, or rhonchi  HEART: osiel, no murmurs appreciated   ABDOMEN: Soft, non-tender, non-distended; normal bowel sounds  EXTREMITIES:  2+ peripheral pulses b/l, No clubbing, cyanosis, or edema  NEUROLOGY: A&O x 3, no focal deficits    LABS:                        12.9   4.80  )-----------( 183      ( 2024 00:48 )             38.8     04-18    136  |  101  |  11  ----------------------------<  136<H>  4.3   |  21<L>  |  0.66    Ca    9.3      2024 00:48  Phos  3.9     04-18  Mg     2.0     04-18    TPro  6.4  /  Alb  3.4  /  TBili  0.2  /  DBili  x   /  AST  16  /  ALT  9<L>  /  AlkPhos  91  04-18    LIVER FUNCTIONS - ( 2024 00:48 )  Alb: 3.4 g/dL / Pro: 6.4 g/dL / ALK PHOS: 91 U/L / ALT: 9 U/L / AST: 16 U/L / GGT: x           PT/INR - ( 2024 00:48 )   PT: 9.6 sec;   INR: 0.87 ratio         PTT - ( 2024 00:48 )  PTT:48.1 sec  CAPILLARY BLOOD GLUCOSE                Urinalysis Basic - ( 2024 00:48 )    Color: x / Appearance: x / SG: x / pH: x  Gluc: 136 mg/dL / Ketone: x  / Bili: x / Urobili: x   Blood: x / Protein: x / Nitrite: x   Leuk Esterase: x / RBC: x / WBC x   Sq Epi: x / Non Sq Epi: x / Bacteria: x          RADIOLOGY & ADDITIONAL TESTS:  CXR:        Care Discussed with Consultants/Other Providers [ x] YES  [ ] NO           Patient is a 58y old  Female who presents with a chief complaint of Bradycardia (2024 19:33)    HPI:  58-year-old female with a past medical history of hyperthyroidism on methimazole presenting with right-sided chest pain, cough, headache since Friday. Patient also complaining of chills and sweats. Patient had 1 episode of hemoptysis 2 days ago. associated left-sided headache. Patients coworker had COVID last week. EKG in the ED revealed sinus bradycardia with frequent PVCs. Febrile to 101.2 on admission. RVP and UA negative, CTA negative for PE. Also reports taking Methimazole at home without consistent appts or TSH being checked with question of methimazole toxicity. Hypotensive to 80/40 on admission despite 2L of IVF so started on Levophed. EP consulted and said no need for TVP at this time. Admitted to CICU for further workup. (2024 19:11)       INTERVAL HPI/OVERNIGHT EVENTS:   NAEON  Afebrile  Daughter present at bedside and provided translation. Denies cp, sob, dizziness, lightheadness. Reports night sweats last night. Denies hemoptysis in AM. Daughter and mom got into verbal disagreement in AM    Subjective:    ICU Vital Signs Last 24 Hrs  T(C): 36.8 (2024 03:00), Max: 37.2 (2024 19:15)  T(F): 98.3 (2024 03:00), Max: 99 (2024 23:00)  HR: 59 (2024 07:00) (43 - 114)  BP: 96/53 (2024 07:00) (71/48 - 121/57)  BP(mean): 70 (2024 07:00) (53 - 82)  ABP: --  ABP(mean): --  RR: 16 (2024 07:00) (14 - 48)  SpO2: 96% (2024 07:00) (95% - 99%)    O2 Parameters below as of 2024 07:00  Patient On (Oxygen Delivery Method): room air          I&O's Summary    2024 07:01  -  2024 07:00  --------------------------------------------------------  IN: 1118.1 mL / OUT: 2750 mL / NET: -1631.9 mL          Daily     Daily Weight in k.3 (2024 05:15)    Adult Advanced Hemodynamics Last 24 Hrs  CVP(mm Hg): --  CVP(cm H2O): --  CO: --  CI: --  PA: --  PA(mean): --  PCWP: --  SVR: --  SVRI: --  PVR: --  PVRI: --    EKG/Telemetry Events: sinus bradycardia with frequent bigem, trigem    MEDICATIONS  (STANDING):  cefepime   IVPB      cefepime   IVPB 1000 milliGRAM(s) IV Intermittent every 8 hours  chlorhexidine 2% Cloths 1 Application(s) Topical <User Schedule>  DOPamine Infusion 5 MICROgram(s)/kG/Min (11.1 mL/Hr) IV Continuous <Continuous>  heparin   Injectable 5000 Unit(s) SubCutaneous every 8 hours  phenylephrine    Infusion 0.1 MICROgram(s)/kG/Min (2.21 mL/Hr) IV Continuous <Continuous>  polyethylene glycol 3350 17 Gram(s) Oral daily  senna 2 Tablet(s) Oral at bedtime    MEDICATIONS  (PRN):    PHYSICAL EXAM:  GENERAL: No acute distress, well-developed  HEAD:  Atraumatic, Normocephalic  EYES: EOMI, PERRLA  CHEST/LUNG: CTAB; No wheezes, rales, or rhonchi  HEART: osiel, no murmurs appreciated   ABDOMEN: Soft, non-tender, non-distended; normal bowel sounds  EXTREMITIES:  2+ peripheral pulses b/l, No clubbing, cyanosis, or edema  NEUROLOGY: A&O x 3, no focal deficits    LABS:                        12.9   4.80  )-----------( 183      ( 2024 00:48 )             38.8     04-18    136  |  101  |  11  ----------------------------<  136<H>  4.3   |  21<L>  |  0.66    Ca    9.3      2024 00:48  Phos  3.9     04-18  Mg     2.0     04-18    TPro  6.4  /  Alb  3.4  /  TBili  0.2  /  DBili  x   /  AST  16  /  ALT  9<L>  /  AlkPhos  91      LIVER FUNCTIONS - ( 2024 00:48 )  Alb: 3.4 g/dL / Pro: 6.4 g/dL / ALK PHOS: 91 U/L / ALT: 9 U/L / AST: 16 U/L / GGT: x           PT/INR - ( 2024 00:48 )   PT: 9.6 sec;   INR: 0.87 ratio         PTT - ( 2024 00:48 )  PTT:48.1 sec  CAPILLARY BLOOD GLUCOSE                Urinalysis Basic - ( 2024 00:48 )    Color: x / Appearance: x / SG: x / pH: x  Gluc: 136 mg/dL / Ketone: x  / Bili: x / Urobili: x   Blood: x / Protein: x / Nitrite: x   Leuk Esterase: x / RBC: x / WBC x   Sq Epi: x / Non Sq Epi: x / Bacteria: x          RADIOLOGY & ADDITIONAL TESTS:  CXR:        Care Discussed with Consultants/Other Providers [ x] YES  [ ] NO

## 2024-04-18 NOTE — PROGRESS NOTE ADULT - ASSESSMENT
58-year-old female with a past medical history of hyperthyroidism on methimazole presenting with right-sided chest pain, cough, headache since Friday. Patient also complaining of chills and sweats. Patient had 1 episode of hemoptysis 2 days ago. associated left-sided headache. Patients coworker had COVID last week. EKG in the ED revealed sinus bradycardia with frequent PVCs. Febrile to 101.2 on admission. RVP and UA negative, CTA negative for PE.  Hypotensive to 80/40 on admission despite 2L of IVF so started on Levophed. Admitted to CICU for further workup.     ED t max 101.2, No leucocytosis, labs unremarkable. started on vancomycin and Zosyn    Hospital course ; Afebrile, abx switched to cefepime. Being ruled out for TB. On dopamine.  UA 4/16 unremarkable  Limited RVP 4/16 Negative  Bcx 4/16 NGTD  MRSA PCR collected  QuantiFeron collected     Procal 0.05  CRP 30      CTA; No PE, dependent atelectasis  Limited ECHO; no pericardia, pleural effusion       # Fevers   # Cough/ trace blood in sputum  # Hyperthyroidism    - Unclear source of fevers  - Check CT head and CT sinus  - Would check HIV, ESR, LONG, RF, Ferritin  - Check full RVP, Legionella urine Ag  - Low risk for TB;  r/o TB with AFB X3, Follow up QuantiFeron- now denies hemoptysis   - Continue cefepime for now; would switch to 1 gm Q8h      Discussed with attending and primary service    Roxana Riley M.D. ,   please reach via teams   If no answer, or after 5PM/ weekends,  then please call  [phone]

## 2024-04-18 NOTE — DIETITIAN INITIAL EVALUATION ADULT - PERTINENT MEDS FT
MEDICATIONS  (STANDING):  cefepime   IVPB 1000 milliGRAM(s) IV Intermittent every 8 hours  cefepime   IVPB      chlorhexidine 2% Cloths 1 Application(s) Topical <User Schedule>  DOPamine Infusion 5 MICROgram(s)/kG/Min (11.1 mL/Hr) IV Continuous <Continuous>  heparin   Injectable 5000 Unit(s) SubCutaneous every 8 hours  midodrine 5 milliGRAM(s) Oral every 8 hours  phenylephrine    Infusion 0.1 MICROgram(s)/kG/Min (2.21 mL/Hr) IV Continuous <Continuous>  polyethylene glycol 3350 17 Gram(s) Oral daily  senna 2 Tablet(s) Oral at bedtime    MEDICATIONS  (PRN):  bisacodyl 5 milliGRAM(s) Oral every 12 hours PRN Constipation

## 2024-04-18 NOTE — DIETITIAN INITIAL EVALUATION ADULT - PERSON TAUGHT/METHOD
adequate caloric/protein intake w/ food sources reviewed, smoothie of the day, meal ordering process, strategies to increase PO intake, all questions were answered/and Slovenian  Josiah #[id number] for Slovenian translation/verbal instruction/teach back - (Patient repeats in own words)/patient instructed

## 2024-04-19 LAB
24R-OH-CALCIDIOL SERPL-MCNC: 25.5 NG/ML — LOW (ref 30–80)
ALBUMIN SERPL ELPH-MCNC: 3.7 G/DL — SIGNIFICANT CHANGE UP (ref 3.3–5)
ALP SERPL-CCNC: 80 U/L — SIGNIFICANT CHANGE UP (ref 40–120)
ALT FLD-CCNC: 12 U/L — SIGNIFICANT CHANGE UP (ref 10–45)
ANION GAP SERPL CALC-SCNC: 11 MMOL/L — SIGNIFICANT CHANGE UP (ref 5–17)
APTT BLD: 53.5 SEC — HIGH (ref 24.5–35.6)
AST SERPL-CCNC: 15 U/L — SIGNIFICANT CHANGE UP (ref 10–40)
BASOPHILS # BLD AUTO: 0.04 K/UL — SIGNIFICANT CHANGE UP (ref 0–0.2)
BASOPHILS NFR BLD AUTO: 0.6 % — SIGNIFICANT CHANGE UP (ref 0–2)
BILIRUB SERPL-MCNC: 0.2 MG/DL — SIGNIFICANT CHANGE UP (ref 0.2–1.2)
BUN SERPL-MCNC: 19 MG/DL — SIGNIFICANT CHANGE UP (ref 7–23)
C3 SERPL-MCNC: 150 MG/DL — SIGNIFICANT CHANGE UP (ref 81–157)
C4 SERPL-MCNC: 39 MG/DL — SIGNIFICANT CHANGE UP (ref 13–39)
CALCIUM SERPL-MCNC: 9.6 MG/DL — SIGNIFICANT CHANGE UP (ref 8.4–10.5)
CHLORIDE SERPL-SCNC: 103 MMOL/L — SIGNIFICANT CHANGE UP (ref 96–108)
CO2 SERPL-SCNC: 23 MMOL/L — SIGNIFICANT CHANGE UP (ref 22–31)
CREAT SERPL-MCNC: 0.64 MG/DL — SIGNIFICANT CHANGE UP (ref 0.5–1.3)
EGFR: 101 ML/MIN/1.73M2 — SIGNIFICANT CHANGE UP
EOSINOPHIL # BLD AUTO: 0.18 K/UL — SIGNIFICANT CHANGE UP (ref 0–0.5)
EOSINOPHIL NFR BLD AUTO: 2.9 % — SIGNIFICANT CHANGE UP (ref 0–6)
GAMMA INTERFERON BACKGROUND BLD IA-ACNC: 0.16 IU/ML — SIGNIFICANT CHANGE UP
GLUCOSE SERPL-MCNC: 81 MG/DL — SIGNIFICANT CHANGE UP (ref 70–99)
HCT VFR BLD CALC: 38.5 % — SIGNIFICANT CHANGE UP (ref 34.5–45)
HGB BLD-MCNC: 13 G/DL — SIGNIFICANT CHANGE UP (ref 11.5–15.5)
HPIV3 RNA SPEC QL NAA+PROBE: DETECTED
IMM GRANULOCYTES NFR BLD AUTO: 0.8 % — SIGNIFICANT CHANGE UP (ref 0–0.9)
INR BLD: 0.91 RATIO — SIGNIFICANT CHANGE UP (ref 0.85–1.18)
LYMPHOCYTES # BLD AUTO: 3.87 K/UL — HIGH (ref 1–3.3)
LYMPHOCYTES # BLD AUTO: 61.7 % — HIGH (ref 13–44)
M TB IFN-G BLD-IMP: NEGATIVE — SIGNIFICANT CHANGE UP
M TB IFN-G CD4+ BCKGRND COR BLD-ACNC: 0.28 IU/ML — SIGNIFICANT CHANGE UP
M TB IFN-G CD4+CD8+ BCKGRND COR BLD-ACNC: 0.2 IU/ML — SIGNIFICANT CHANGE UP
MAGNESIUM SERPL-MCNC: 2.2 MG/DL — SIGNIFICANT CHANGE UP (ref 1.6–2.6)
MCHC RBC-ENTMCNC: 31.2 PG — SIGNIFICANT CHANGE UP (ref 27–34)
MCHC RBC-ENTMCNC: 33.8 GM/DL — SIGNIFICANT CHANGE UP (ref 32–36)
MCV RBC AUTO: 92.3 FL — SIGNIFICANT CHANGE UP (ref 80–100)
MONOCYTES # BLD AUTO: 0.34 K/UL — SIGNIFICANT CHANGE UP (ref 0–0.9)
MONOCYTES NFR BLD AUTO: 5.4 % — SIGNIFICANT CHANGE UP (ref 2–14)
NEUTROPHILS # BLD AUTO: 1.79 K/UL — LOW (ref 1.8–7.4)
NEUTROPHILS NFR BLD AUTO: 28.6 % — LOW (ref 43–77)
NIGHT BLUE STAIN TISS: SIGNIFICANT CHANGE UP
NRBC # BLD: 0 /100 WBCS — SIGNIFICANT CHANGE UP (ref 0–0)
PHOSPHATE SERPL-MCNC: 4 MG/DL — SIGNIFICANT CHANGE UP (ref 2.5–4.5)
PLATELET # BLD AUTO: 200 K/UL — SIGNIFICANT CHANGE UP (ref 150–400)
POTASSIUM SERPL-MCNC: 4.4 MMOL/L — SIGNIFICANT CHANGE UP (ref 3.5–5.3)
POTASSIUM SERPL-SCNC: 4.4 MMOL/L — SIGNIFICANT CHANGE UP (ref 3.5–5.3)
PROT SERPL-MCNC: 6.4 G/DL — SIGNIFICANT CHANGE UP (ref 6–8.3)
PROTHROM AB SERPL-ACNC: 9.6 SEC — SIGNIFICANT CHANGE UP (ref 9.5–13)
QUANT TB PLUS MITOGEN MINUS NIL: 2.29 IU/ML — SIGNIFICANT CHANGE UP
RAPID RVP RESULT: DETECTED
RBC # BLD: 4.17 M/UL — SIGNIFICANT CHANGE UP (ref 3.8–5.2)
RBC # FLD: 12.3 % — SIGNIFICANT CHANGE UP (ref 10.3–14.5)
SARS-COV-2 RNA SPEC QL NAA+PROBE: SIGNIFICANT CHANGE UP
SODIUM SERPL-SCNC: 137 MMOL/L — SIGNIFICANT CHANGE UP (ref 135–145)
SPECIMEN SOURCE: SIGNIFICANT CHANGE UP
VIT D25+D1,25 OH+D1,25 PNL SERPL-MCNC: 32.6 PG/ML — SIGNIFICANT CHANGE UP (ref 19.9–79.3)
WBC # BLD: 6.27 K/UL — SIGNIFICANT CHANGE UP (ref 3.8–10.5)
WBC # FLD AUTO: 6.27 K/UL — SIGNIFICANT CHANGE UP (ref 3.8–10.5)

## 2024-04-19 PROCEDURE — 70450 CT HEAD/BRAIN W/O DYE: CPT | Mod: 26

## 2024-04-19 PROCEDURE — 70486 CT MAXILLOFACIAL W/O DYE: CPT | Mod: 26

## 2024-04-19 PROCEDURE — 74177 CT ABD & PELVIS W/CONTRAST: CPT | Mod: 26

## 2024-04-19 PROCEDURE — 99232 SBSQ HOSP IP/OBS MODERATE 35: CPT

## 2024-04-19 PROCEDURE — 93010 ELECTROCARDIOGRAM REPORT: CPT

## 2024-04-19 PROCEDURE — 99291 CRITICAL CARE FIRST HOUR: CPT

## 2024-04-19 RX ORDER — HEPARIN SODIUM 5000 [USP'U]/ML
5000 INJECTION INTRAVENOUS; SUBCUTANEOUS EVERY 12 HOURS
Refills: 0 | Status: DISCONTINUED | OUTPATIENT
Start: 2024-04-19 | End: 2024-04-20

## 2024-04-19 RX ADMIN — MIDODRINE HYDROCHLORIDE 5 MILLIGRAM(S): 2.5 TABLET ORAL at 21:25

## 2024-04-19 RX ADMIN — CEFEPIME 100 MILLIGRAM(S): 1 INJECTION, POWDER, FOR SOLUTION INTRAMUSCULAR; INTRAVENOUS at 13:20

## 2024-04-19 RX ADMIN — CHLORHEXIDINE GLUCONATE 1 APPLICATION(S): 213 SOLUTION TOPICAL at 05:40

## 2024-04-19 RX ADMIN — SENNA PLUS 2 TABLET(S): 8.6 TABLET ORAL at 21:25

## 2024-04-19 RX ADMIN — CEFEPIME 100 MILLIGRAM(S): 1 INJECTION, POWDER, FOR SOLUTION INTRAMUSCULAR; INTRAVENOUS at 05:39

## 2024-04-19 RX ADMIN — POLYETHYLENE GLYCOL 3350 17 GRAM(S): 17 POWDER, FOR SOLUTION ORAL at 17:37

## 2024-04-19 RX ADMIN — MIDODRINE HYDROCHLORIDE 5 MILLIGRAM(S): 2.5 TABLET ORAL at 05:39

## 2024-04-19 RX ADMIN — MIDODRINE HYDROCHLORIDE 5 MILLIGRAM(S): 2.5 TABLET ORAL at 13:21

## 2024-04-19 RX ADMIN — HEPARIN SODIUM 5000 UNIT(S): 5000 INJECTION INTRAVENOUS; SUBCUTANEOUS at 17:37

## 2024-04-19 RX ADMIN — HEPARIN SODIUM 5000 UNIT(S): 5000 INJECTION INTRAVENOUS; SUBCUTANEOUS at 05:39

## 2024-04-19 NOTE — PROGRESS NOTE ADULT - SUBJECTIVE AND OBJECTIVE BOX
Patient is a 58y old  Female who presents with a chief complaint of Bradycardia (19 Apr 2024 11:12)    Being followed by ID for        Interval history:  No other acute events      ROS:  No cough,SOB,CP  No N/V/D  No abd pain  No urinary complaints  No HA  No joint or limb pain  No other complaints    PAST MEDICAL & SURGICAL HISTORY:  Hyperthyroidism        Allergies    No Known Allergies    Intolerances      Antimicrobials:    cefepime   IVPB 1000 milliGRAM(s) IV Intermittent every 8 hours  cefepime   IVPB        MEDICATIONS  (STANDING):  cefepime   IVPB 1000 milliGRAM(s) IV Intermittent every 8 hours  cefepime   IVPB      chlorhexidine 2% Cloths 1 Application(s) Topical <User Schedule>  heparin   Injectable 5000 Unit(s) SubCutaneous every 12 hours  midodrine 5 milliGRAM(s) Oral every 8 hours  polyethylene glycol 3350 17 Gram(s) Oral two times a day  senna 2 Tablet(s) Oral at bedtime      Vital Signs Last 24 Hrs  T(C): 36.7 (04-19-24 @ 16:00), Max: 37.1 (04-18-24 @ 19:30)  T(F): 98.1 (04-19-24 @ 16:00), Max: 98.7 (04-18-24 @ 19:30)  HR: 62 (04-19-24 @ 17:00) (51 - 91)  BP: 105/49 (04-19-24 @ 17:00) (81/38 - 136/58)  BP(mean): 71 (04-19-24 @ 17:00) (55 - 86)  RR: 32 (04-19-24 @ 17:00) (15 - 39)  SpO2: 96% (04-19-24 @ 17:00) (93% - 98%)    Physical Exam:    Constitutional well preserved,comfortable,pleasant    HEENT PERRLA EOMI,No pallor or icterus    No oral exudate or erythema    Neck supple no JVD or LN    Chest Good AE,CTA    CVS RRR S1 S2 WNl No murmur or rub or gallop    Abd soft BS normal No tenderness no masses    Ext No cyanosis clubbing or edema    IV site no erythema tenderness or discharge    Joints no swelling or LOM    CNS AAO X 3 no focal    Lab Data:                          13.0   6.27  )-----------( 200      ( 19 Apr 2024 03:41 )             38.5       04-19    137  |  103  |  19  ----------------------------<  81  4.4   |  23  |  0.64    Ca    9.6      19 Apr 2024 03:42  Phos  4.0     04-19  Mg     2.2     04-19    TPro  6.4  /  Alb  3.7  /  TBili  0.2  /  DBili  x   /  AST  15  /  ALT  12  /  AlkPhos  80  04-19        Procalcitonin, Serum (04.17.24 @ 02:04)    Procalcitonin, Serum: 0.05: This assay is intended for use to determine the change of PCT over time  as an aid in assessing the cumulative 28-day risk of all-cause mortality  for patients diagnosed with severe sepsis or septic shock in the ICU, or  when obtained in the emergency department or other medical wards prior to  ICU admission. This assay was performed by the Roche UshiS PCT  assay. ng/mL      .Sputum Sputum  04-17-24 --  --  --      Clean Catch Clean Catch (Midstream)  04-16-24   <10,000 CFU/mL Normal Urogenital Elaine  --  --      .Blood Blood-Peripheral  04-16-24   No growth at 72 Hours  --  --      .Blood Blood-Peripheral  04-16-24   No growth at 72 Hours  --  --      Respiratory Viral Panel with COVID-19 by DANIEL (04.16.24 @ 10:37)    Rapid RVP Result: Detected   SARS-CoV-2: NotDete: This Respiratory Panel uses polymerase chain reaction (PCR) to detect for  adenovirus; coronavirus (HKU1, NL63, 229E, OC43); human metapneumovirus  (hMPV); human enterovirus/rhinovirus (Entero/RV); influenza A; influenza  A/H1; influenza A/H3; influenza A/H1-2009; influenza B; parainfluenza  viruses 1, 2, 3, 4; respiratory syncytial virus; Mycoplasma pneumoniae;  Chlamydophila pneumoniae; and SARS-CoV-2.   Parainfluenza 3 (RapRVP): Detected      < from: CT Maxillofacial No Cont (04.19.24 @ 13:09) >  ACC: [id number] EXAM:  CT MAXILLOFACIAL   ORDERED BY: JOHNATHAN TORRES     ACC: [id number] EXAM:  CT BRAIN   ORDERED BY: AVIS LESTER     PROCEDURE DATE:  04/19/2024          INTERPRETATION:  .    CLINICAL INFORMATION: Thyrotoxicosis. Fevers. Evaluate for signs or   symptoms of infection.    TECHNIQUE: Axial CT images were obtained through the brain, paranasal   sinuses, and orbits. Coronal and sagittal reconstruction images were also   obtained.    COMPARISON: None available.    FINDINGS:    NONCONTRAST HEAD CT: There is no acute intracranial hemorrhage, mass   effect, shift of the midline structures, herniation, extra-axial fluid   collection, or hydrocephalus.    The calvarium appears intact.    NONCONTRAST MAXILLOFACIAL CT: Scattered mucosal thickening is seen   throughout the paranasal sinuses.    The tympanomastoid cavities are clear.    A nasal prosthesis is seen.    Rightward deviation of the nasal septum is notable. There is no nasal   cavity mass. The nasopharynx appears unremarkable.    The bilateral orbits inclusive of the globes, extraocular muscles, optic   nerves, and orbital fat appear grossly unremarkable.    The maxilla, mandible, and zygomatic arches are intact. The TMJ spaces   appear within normal limits.    IMPRESSION:    NONCONTRAST HEAD CT: No acute intracranial hemorrhage, mass effect, or   shift of the midline structures.    NONCONTRAST MAXILLOFACIAL CT: Mild scattered inflammatory changes   throughout the paranasal sinuses.    --- End of Report ---            ILIANA MCCANN MD; Attending Radiologist  This document has been electronically signed. Apr 19 2024  1:18PM    < end of copied text >        < from: CT Abdomen and Pelvis w/ IV Cont (04.19.24 @ 13:12) >    ACC: [id number] EXAM:  CT ABDOMEN AND PELVIS IC   ORDERED BY: AVIS LESTER     PROCEDURE DATE:  04/19/2024          INTERPRETATION:  CLINICAL INFORMATION: Sepsis. Assess for infection.   Right-sided chest pain, cough, headache with chills and night sweats.   Hypotension and bradycardia.    COMPARISON: CT chest 4/16/2024.    CONTRAST/COMPLICATIONS:  IV Contrast: Omnipaque 350  90 cc administered   10 cc discarded  Oral Contrast: NONE  Complications: None reported at time of study completion    PROCEDURE:  CT of the Abdomen and Pelvis was performed.  Sagittal and coronal reformats were performed.    FINDINGS:  LOWER CHEST: Left lower lobe subsegmental atelectasis. Partially   visualized bilateral breast implants. Partially visualized bilateral   upper extremity peripheral IV catheters.    LIVER: Within normal limits.  BILE DUCTS: Normal caliber.  GALLBLADDER: Cholelithiasis.  SPLEEN: Within normal limits.  PANCREAS: Within normal limits.  ADRENALS: Within normal limits.  KIDNEYS/URETERS: Within normal limits.    BLADDER: Minimally distended.  REPRODUCTIVE ORGANS: Uterus within normal limits.    BOWEL: No bowel obstruction. Appendix is normal.  PERITONEUM: No ascites.  VESSELS: Within normal limits.  RETROPERITONEUM/LYMPH NODES: No lymphadenopathy.  ABDOMINAL WALL: Within normal limits.  BONES: Degenerative changes. Left hip orthopedic hardware.    IMPRESSION:  No acute intra-abdominal or pelvic pathology.        --- End of Report ---             UDAY TRAN MD; Attending Radiologist  This document has been electronically signed. Apr 19 2024  1:31PM    < end of copied text >        WBC Count: 6.27 (04-19-24 @ 03:41)  WBC Count: 4.80 (04-18-24 @ 00:48)  WBC Count: 5.05 (04-17-24 @ 02:05)  WBC Count: 6.32 (04-16-24 @ 10:40)       Bilirubin Total: 0.2 mg/dL (04-19-24 @ 03:42)  Aspartate Aminotransferase (AST/SGOT): 15 U/L (04-19-24 @ 03:42)  Alanine Aminotransferase (ALT/SGPT): 12 U/L (04-19-24 @ 03:42)  Alkaline Phosphatase: 80 U/L (04-19-24 @ 03:42)  Bilirubin Total: 0.2 mg/dL (04-18-24 @ 00:48)  Aspartate Aminotransferase (AST/SGOT): 16 U/L (04-18-24 @ 00:48)  Alanine Aminotransferase (ALT/SGPT): 9 U/L (04-18-24 @ 00:48)  Alkaline Phosphatase: 91 U/L (04-18-24 @ 00:48)  Bilirubin Total: 0.3 mg/dL (04-17-24 @ 13:10)  Aspartate Aminotransferase (AST/SGOT): 17 U/L (04-17-24 @ 13:10)  Alanine Aminotransferase (ALT/SGPT): 10 U/L (04-17-24 @ 13:10)  Alkaline Phosphatase: 79 U/L (04-17-24 @ 13:10)  Bilirubin Total: 0.3 mg/dL (04-17-24 @ 02:04)  Aspartate Aminotransferase (AST/SGOT): 18 U/L (04-17-24 @ 02:04)  Alanine Aminotransferase (ALT/SGPT): 10 U/L (04-17-24 @ 02:04)  Alkaline Phosphatase: 82 U/L (04-17-24 @ 02:04)  Bilirubin Total: 0.3 mg/dL (04-16-24 @ 10:40)  Aspartate Aminotransferase (AST/SGOT): 17 U/L (04-16-24 @ 10:40)  Alanine Aminotransferase (ALT/SGPT): 10 U/L (04-16-24 @ 10:40)  Alkaline Phosphatase: 71 U/L (04-16-24 @ 10:40)         Patient is a 58y old  Female who presents with a chief complaint of Bradycardia (19 Apr 2024 11:12)    Being followed by ID for fever        Interval history:  pt feeling better today  temp better  less coughing today   off the dobutamine  found to have parainfluenza  No other acute events        PAST MEDICAL & SURGICAL HISTORY:  Hyperthyroidism        Allergies    No Known Allergies    Intolerances      Antimicrobials:    cefepime   IVPB 1000 milliGRAM(s) IV Intermittent every 8 hours  cefepime   IVPB        MEDICATIONS  (STANDING):  cefepime   IVPB 1000 milliGRAM(s) IV Intermittent every 8 hours  cefepime   IVPB      chlorhexidine 2% Cloths 1 Application(s) Topical <User Schedule>  heparin   Injectable 5000 Unit(s) SubCutaneous every 12 hours  midodrine 5 milliGRAM(s) Oral every 8 hours  polyethylene glycol 3350 17 Gram(s) Oral two times a day  senna 2 Tablet(s) Oral at bedtime      Vital Signs Last 24 Hrs  T(C): 36.7 (04-19-24 @ 16:00), Max: 37.1 (04-18-24 @ 19:30)  T(F): 98.1 (04-19-24 @ 16:00), Max: 98.7 (04-18-24 @ 19:30)  HR: 62 (04-19-24 @ 17:00) (51 - 91)  BP: 105/49 (04-19-24 @ 17:00) (81/38 - 136/58)  BP(mean): 71 (04-19-24 @ 17:00) (55 - 86)  RR: 32 (04-19-24 @ 17:00) (15 - 39)  SpO2: 96% (04-19-24 @ 17:00) (93% - 98%)    Physical Exam:    Constitutional well preserved,comfortable,pleasant    HEENT PERRLA EOMI,No pallor or icterus    No oral exudate or erythema    Neck supple no JVD or LN    Chest Good AE,CTA    CVS  S1 S2     Abd soft BS normal No tenderness     Ext No cyanosis clubbing or edema    IV site no erythema tenderness or discharge    Joints no swelling or LOM    CNS AAO X 3 no focal    Lab Data:                          13.0   6.27  )-----------( 200      ( 19 Apr 2024 03:41 )             38.5       04-19    137  |  103  |  19  ----------------------------<  81  4.4   |  23  |  0.64    Ca    9.6      19 Apr 2024 03:42  Phos  4.0     04-19  Mg     2.2     04-19    TPro  6.4  /  Alb  3.7  /  TBili  0.2  /  DBili  x   /  AST  15  /  ALT  12  /  AlkPhos  80  04-19        Procalcitonin, Serum (04.17.24 @ 02:04)    Procalcitonin, Serum: 0.05: This assay is intended for use to determine the change of PCT over time  as an aid in assessing the cumulative 28-day risk of all-cause mortality  for patients diagnosed with severe sepsis or septic shock in the ICU, or  when obtained in the emergency department or other medical wards prior to  ICU admission. This assay was performed by the [employer] [employer] PCT  assay. ng/mL      .Sputum Sputum  04-17-24 --  --  --      Clean Catch Clean Catch (Midstream)  04-16-24   <10,000 CFU/mL Normal Urogenital Elaine  --  --      .Blood Blood-Peripheral  04-16-24   No growth at 72 Hours  --  --      .Blood Blood-Peripheral  04-16-24   No growth at 72 Hours  --  --      Respiratory Viral Panel with COVID-19 by DANIEL (04.16.24 @ 10:37)    Rapid RVP Result: Detected   SARS-CoV-2: NotDete: This Respiratory Panel uses polymerase chain reaction (PCR) to detect for  adenovirus; coronavirus (HKU1, NL63, 229E, OC43); human metapneumovirus  (hMPV); human enterovirus/rhinovirus (Entero/RV); influenza A; influenza  A/H1; influenza A/H3; influenza A/H1-2009; influenza B; parainfluenza  viruses 1, 2, 3, 4; respiratory syncytial virus; Mycoplasma pneumoniae;  Chlamydophila pneumoniae; and SARS-CoV-2.   Parainfluenza 3 (RapRVP): Detected      < from: CT Maxillofacial No Cont (04.19.24 @ 13:09) >  ACC: [id number] EXAM:  CT MAXILLOFACIAL   ORDERED BY: JOHNATHAN TORRES     ACC: [id number] EXAM:  CT BRAIN   ORDERED BY: AVIS LESTER     PROCEDURE DATE:  04/19/2024          INTERPRETATION:  .    CLINICAL INFORMATION: Thyrotoxicosis. Fevers. Evaluate for signs or   symptoms of infection.    TECHNIQUE: Axial CT images were obtained through the brain, paranasal   sinuses, and orbits. Coronal and sagittal reconstruction images were also   obtained.    COMPARISON: None available.    FINDINGS:    NONCONTRAST HEAD CT: There is no acute intracranial hemorrhage, mass   effect, shift of the midline structures, herniation, extra-axial fluid   collection, or hydrocephalus.    The calvarium appears intact.    NONCONTRAST MAXILLOFACIAL CT: Scattered mucosal thickening is seen   throughout the paranasal sinuses.    The tympanomastoid cavities are clear.    A nasal prosthesis is seen.    Rightward deviation of the nasal septum is notable. There is no nasal   cavity mass. The nasopharynx appears unremarkable.    The bilateral orbits inclusive of the globes, extraocular muscles, optic   nerves, and orbital fat appear grossly unremarkable.    The maxilla, mandible, and zygomatic arches are intact. The TMJ spaces   appear within normal limits.    IMPRESSION:    NONCONTRAST HEAD CT: No acute intracranial hemorrhage, mass effect, or   shift of the midline structures.    NONCONTRAST MAXILLOFACIAL CT: Mild scattered inflammatory changes   throughout the paranasal sinuses.    --- End of Report ---            ILIANA MCCANN MD; Attending Radiologist  This document has been electronically signed. Apr 19 2024  1:18PM    < end of copied text >        < from: CT Abdomen and Pelvis w/ IV Cont (04.19.24 @ 13:12) >    ACC: [id number] EXAM:  CT ABDOMEN AND PELVIS IC   ORDERED BY: AVIS LESTER     PROCEDURE DATE:  04/19/2024          INTERPRETATION:  CLINICAL INFORMATION: Sepsis. Assess for infection.   Right-sided chest pain, cough, headache with chills and night sweats.   Hypotension and bradycardia.    COMPARISON: CT chest 4/16/2024.    CONTRAST/COMPLICATIONS:  IV Contrast: Omnipaque 350  90 cc administered   10 cc discarded  Oral Contrast: NONE  Complications: None reported at time of study completion    PROCEDURE:  CT of the Abdomen and Pelvis was performed.  Sagittal and coronal reformats were performed.    FINDINGS:  LOWER CHEST: Left lower lobe subsegmental atelectasis. Partially   visualized bilateral breast implants. Partially visualized bilateral   upper extremity peripheral IV catheters.    LIVER: Within normal limits.  BILE DUCTS: Normal caliber.  GALLBLADDER: Cholelithiasis.  SPLEEN: Within normal limits.  PANCREAS: Within normal limits.  ADRENALS: Within normal limits.  KIDNEYS/URETERS: Within normal limits.    BLADDER: Minimally distended.  REPRODUCTIVE ORGANS: Uterus within normal limits.    BOWEL: No bowel obstruction. Appendix is normal.  PERITONEUM: No ascites.  VESSELS: Within normal limits.  RETROPERITONEUM/LYMPH NODES: No lymphadenopathy.  ABDOMINAL WALL: Within normal limits.  BONES: Degenerative changes. Left hip orthopedic hardware.    IMPRESSION:  No acute intra-abdominal or pelvic pathology.        --- End of Report ---             UDAY TRAN MD; Attending Radiologist  This document has been electronically signed. Apr 19 2024  1:31PM    < end of copied text >        WBC Count: 6.27 (04-19-24 @ 03:41)  WBC Count: 4.80 (04-18-24 @ 00:48)  WBC Count: 5.05 (04-17-24 @ 02:05)  WBC Count: 6.32 (04-16-24 @ 10:40)       Bilirubin Total: 0.2 mg/dL (04-19-24 @ 03:42)  Aspartate Aminotransferase (AST/SGOT): 15 U/L (04-19-24 @ 03:42)  Alanine Aminotransferase (ALT/SGPT): 12 U/L (04-19-24 @ 03:42)  Alkaline Phosphatase: 80 U/L (04-19-24 @ 03:42)    Bilirubin Total: 0.2 mg/dL (04-18-24 @ 00:48)  Aspartate Aminotransferase (AST/SGOT): 16 U/L (04-18-24 @ 00:48)  Alanine Aminotransferase (ALT/SGPT): 9 U/L (04-18-24 @ 00:48)  Alkaline Phosphatase: 91 U/L (04-18-24 @ 00:48)    < from: US Thyroid + Parathyroid (04.18.24 @ 14:20) >    IMPRESSION:    1.9 cm nodule of low sonographic suspicion in the left thyroid lobe.   Suggest continued annual sonographic surveillance.    TI-RAD 3: Mildly suspicious (FNA if > 2.5 cm, Follow if > 1.5 cm)  __________________________________  ACR Thyroid Imaging, Reporting and Data System (TI-RADS): White Paper of   the ACR TI-RADS Committee. J Am Daniel Radiol 2017;14:587-595.    TI-RAD 1: Benign (No FNA)  TI-RAD 2: Not suspicious (No FNA)  TI-RAD 3: Mildly suspicious (FNA if > 2.5 cm, Follow if >1.5 cm)  TI-RAD 4: Moderately suspicious (FNA if > 1.5 cm, Follow if > 1 cm)  TI-RAD 5: Highly suspicious (FNA if > 1 cm, Follow if > 0.5 cm    < end of copied text >    < from: CT Angio Chest PE Protocol w/ IV Cont (04.16.24 @ 14:33) >  INTERPRETATION:  CLINICAL INFORMATION: Chest pain    COMPARISON: Chest x-ray 4/16/2024    CONTRAST/COMPLICATIONS:  IV Contrast: Omnipaque 350  70 cc administered   30 cc discarded  Oral Contrast: NONE  Complications: None reported at time of study completion    PROCEDURE:  CT Angiography of the Chest.  Sagittal and coronal reformats were performed as well as 3D (MIP)   reconstructions.    FINDINGS:    LUNGS AND AIRWAYS: Patent central airways.  Dependent atelectasis.  PLEURA: No pleural effusion.  MEDIASTINUM AND ALANA: No lymphadenopathy.  VESSELS: No pulmonary embolus  HEART: Heart size is normal. No pericardial effusion.  CHEST WALL AND LOWER NECK: Bilateral breast implants  VISUALIZED UPPER ABDOMEN: Within normal limits.  BONES: Within normal limits.    IMPRESSION:  No pulmonary embolus        --- End of Report ---

## 2024-04-19 NOTE — PROGRESS NOTE ADULT - SUBJECTIVE AND OBJECTIVE BOX
Patient is a 58y old  Female who presents with a chief complaint of Bradycardia (2024 20:11)    HPI:  58-year-old female with a past medical history of hyperthyroidism on methimazole presenting with right-sided chest pain, cough, headache since Friday. Patient also complaining of chills and sweats. Patient had 1 episode of hemoptysis 2 days ago. associated left-sided headache. Patients coworker had COVID last week. EKG in the ED revealed sinus bradycardia with frequent PVCs. Febrile to 101.2 on admission. RVP and UA negative, CTA negative for PE. Also reports taking Methimazole at home without consistent appts or TSH being checked with question of methimazole toxicity. Hypotensive to 80/40 on admission despite 2L of IVF so started on Levophed. EP consulted and said no need for TVP at this time. Admitted to CICU for further workup. (2024 19:11)       INTERVAL HPI/OVERNIGHT EVENTS:   No overnight events   Afebrile, hemodynamically stable     Subjective:    ICU Vital Signs Last 24 Hrs  T(C): 37.1 (2024 06:09), Max: 37.1 (2024 19:30)  T(F): 98.7 (2024 06:09), Max: 98.7 (2024 19:30)  HR: 52 (2024 07:00) (49 - 115)  BP: 116/55 (2024 07:00) (80/40 - 136/58)  BP(mean): 75 (2024 07:00) (56 - 86)  ABP: --  ABP(mean): --  RR: 20 (2024 07:00) (14 - 39)  SpO2: 97% (2024 07:00) (94% - 99%)    O2 Parameters below as of 2024 07:00  Patient On (Oxygen Delivery Method): room air          I&O's Summary    2024 07:01  -  2024 07:00  --------------------------------------------------------  IN: 1066.5 mL / OUT: 1900 mL / NET: -833.5 mL          Daily Height in cm: 167.6 (2024 06:09)    Daily Weight in k.4 (2024 06:00)    Adult Advanced Hemodynamics Last 24 Hrs  CVP(mm Hg): --  CVP(cm H2O): --  CO: --  CI: --  PA: --  PA(mean): --  PCWP: --  SVR: --  SVRI: --  PVR: --  PVRI: --    EKG/Telemetry Events:    MEDICATIONS  (STANDING):  cefepime   IVPB 1000 milliGRAM(s) IV Intermittent every 8 hours  cefepime   IVPB      chlorhexidine 2% Cloths 1 Application(s) Topical <User Schedule>  heparin   Injectable 5000 Unit(s) SubCutaneous every 8 hours  midodrine 5 milliGRAM(s) Oral every 8 hours  polyethylene glycol 3350 17 Gram(s) Oral two times a day  senna 2 Tablet(s) Oral at bedtime    MEDICATIONS  (PRN):  bisacodyl 5 milliGRAM(s) Oral every 12 hours PRN Constipation      PHYSICAL EXAM:  GENERAL:   HEAD:  Atraumatic, Normocephalic  EYES: EOMI, PERRLA, conjunctiva and sclera clear  NECK: Supple, No JVD, Normal thyroid, no enlarged nodes  NERVOUS SYSTEM:  Alert & Awake.   CHEST/LUNG: B/L good air entry; No rales, rhonchi, or wheezing  HEART: S1S2 normal, no S3, Regular rate and rhythm; No murmurs  ABDOMEN: Soft, Nontender, Nondistended; Bowel sounds present  EXTREMITIES:  2+ Peripheral Pulses, No clubbing, cyanosis, or edema  LYMPH: No lymphadenopathy noted  SKIN: No rashes or lesions    LABS:                        13.0   6.27  )-----------( 200      ( 2024 03:41 )             38.5     04-19    137  |  103  |  19  ----------------------------<  81  4.4   |  23  |  0.64    Ca    9.6      2024 03:42  Phos  4.0     04-19  Mg     2.2     -19    TPro  6.4  /  Alb  3.7  /  TBili  0.2  /  DBili  x   /  AST  15  /  ALT  12  /  AlkPhos  80  04-19    LIVER FUNCTIONS - ( 2024 03:42 )  Alb: 3.7 g/dL / Pro: 6.4 g/dL / ALK PHOS: 80 U/L / ALT: 12 U/L / AST: 15 U/L / GGT: x           PT/INR - ( 2024 03:42 )   PT: 9.6 sec;   INR: 0.91 ratio         PTT - ( 2024 03:42 )  PTT:53.5 sec  CAPILLARY BLOOD GLUCOSE                Urinalysis Basic - ( 2024 03:42 )    Color: x / Appearance: x / SG: x / pH: x  Gluc: 81 mg/dL / Ketone: x  / Bili: x / Urobili: x   Blood: x / Protein: x / Nitrite: x   Leuk Esterase: x / RBC: x / WBC x   Sq Epi: x / Non Sq Epi: x / Bacteria: x          RADIOLOGY & ADDITIONAL TESTS:  CXR:        Care Discussed with Consultants/Other Providers [ x] YES  [ ] NO           Patient is a 58y old  Female who presents with a chief complaint of Bradycardia (2024 20:11)    HPI:  58-year-old female with a past medical history of hyperthyroidism on methimazole presenting with right-sided chest pain, cough, headache since Friday. Patient also complaining of chills and sweats. Patient had 1 episode of hemoptysis 2 days ago. associated left-sided headache. Patients coworker had COVID last week. EKG in the ED revealed sinus bradycardia with frequent PVCs. Febrile to 101.2 on admission. RVP and UA negative, CTA negative for PE. Also reports taking Methimazole at home without consistent appts or TSH being checked with question of methimazole toxicity. Hypotensive to 80/40 on admission despite 2L of IVF so started on Levophed. EP consulted and said no need for TVP at this time. Admitted to CICU for further workup. (2024 19:11)       INTERVAL HPI/OVERNIGHT EVENTS:   NAEON  Pt denies cp, sob, leg swelling, dizziness or lightheadedness in Am    Subjective:    ICU Vital Signs Last 24 Hrs  T(C): 37.1 (2024 06:09), Max: 37.1 (2024 19:30)  T(F): 98.7 (2024 06:09), Max: 98.7 (2024 19:30)  HR: 52 (2024 07:00) (49 - 115)  BP: 116/55 (2024 07:00) (80/40 - 136/58)  BP(mean): 75 (2024 07:00) (56 - 86)  ABP: --  ABP(mean): --  RR: 20 (2024 07:00) (14 - 39)  SpO2: 97% (2024 07:00) (94% - 99%)    O2 Parameters below as of 2024 07:00  Patient On (Oxygen Delivery Method): room air          I&O's Summary    2024 07:01  -  2024 07:00  --------------------------------------------------------  IN: 1066.5 mL / OUT: 1900 mL / NET: -833.5 mL          Daily Height in cm: 167.6 (2024 06:09)    Daily Weight in k.4 (2024 06:00)    Adult Advanced Hemodynamics Last 24 Hrs  CVP(mm Hg): --  CVP(cm H2O): --  CO: --  CI: --  PA: --  PA(mean): --  PCWP: --  SVR: --  SVRI: --  PVR: --  PVRI: --    EKG/Telemetry Events: Sinus bradycardia, high PVC burden, frequent bigem and trigeminy     MEDICATIONS  (STANDING):  cefepime   IVPB 1000 milliGRAM(s) IV Intermittent every 8 hours  cefepime   IVPB      chlorhexidine 2% Cloths 1 Application(s) Topical <User Schedule>  heparin   Injectable 5000 Unit(s) SubCutaneous every 8 hours  midodrine 5 milliGRAM(s) Oral every 8 hours  polyethylene glycol 3350 17 Gram(s) Oral two times a day  senna 2 Tablet(s) Oral at bedtime    MEDICATIONS  (PRN):  bisacodyl 5 milliGRAM(s) Oral every 12 hours PRN Constipation      PHYSICAL EXAM:  GENERAL: No acute distress, well-developed  HEAD:  Atraumatic, Normocephalic  EYES: EOMI, PERRLA  CHEST/LUNG: CTAB; No wheezes, rales, or rhonchi  HEART: osiel, no murmurs appreciated   ABDOMEN: Soft, non-tender, non-distended; normal bowel sounds  EXTREMITIES:  2+ peripheral pulses b/l, No clubbing, cyanosis, or edema  NEUROLOGY: A&O x 3, no focal deficits    LABS:                        13.0   6.27  )-----------( 200      ( 2024 03:41 )             38.5     04-19    137  |  103  |  19  ----------------------------<  81  4.4   |  23  |  0.64    Ca    9.6      2024 03:42  Phos  4.0     04-19  Mg     2.2     04-19    TPro  6.4  /  Alb  3.7  /  TBili  0.2  /  DBili  x   /  AST  15  /  ALT  12  /  AlkPhos  80  04-19    LIVER FUNCTIONS - ( 2024 03:42 )  Alb: 3.7 g/dL / Pro: 6.4 g/dL / ALK PHOS: 80 U/L / ALT: 12 U/L / AST: 15 U/L / GGT: x           PT/INR - ( 2024 03:42 )   PT: 9.6 sec;   INR: 0.91 ratio         PTT - ( 2024 03:42 )  PTT:53.5 sec  CAPILLARY BLOOD GLUCOSE                Urinalysis Basic - ( 2024 03:42 )    Color: x / Appearance: x / SG: x / pH: x  Gluc: 81 mg/dL / Ketone: x  / Bili: x / Urobili: x   Blood: x / Protein: x / Nitrite: x   Leuk Esterase: x / RBC: x / WBC x   Sq Epi: x / Non Sq Epi: x / Bacteria: x          RADIOLOGY & ADDITIONAL TESTS:  CXR:        Care Discussed with Consultants/Other Providers [ x] YES  [ ] NO

## 2024-04-19 NOTE — PROGRESS NOTE ADULT - ATTENDING COMMENTS
Agree with assessment and plan as above by Dr. Dobbs. Reviewed all pertinent labs, glucose values, and imaging studies. Modifications made as indicated above. Pt. with hx of Graves' now likely in admission with nonadherence to methimazole and chemically euthyroid. Can monitor off methimazole and repeat TFTs as outpatient. Will sign off at this time. Please reconsult if needed.     Dilan Jim D.O  [phone]
Bradycardia, frequent PVCs (bigeminy)  Vasoplegic in the setting of acute viral infection - parainfluenza  ID and EP follow-up appreciated
57 yo woman with hyperthyroidism, p/w fevers, symptomatic osiel     A+Ox3  bradycardia and hypotension requiring dopamine use  EPS consulted   still unclear etiology for this  TTE showed normal EF   O2 sats mid to high 90s on room air  DASH diet  Normal renal function  H/H normal  HSQ for DVT prophylaxis  febrile in the ED, cont abx, ID consulted, on airborne precautions   Sugars controlled  No central access
57 yo woman with hyperthyroidism, p/w fevers, symptomatic osiel     A+Ox3  bradycardia and hypotension requiring dopamine use, phenyl used yesterday as well  EPS consulted as d/w Dr. Villalta this am, will attempt low dose midodrine 5 mg TID, potential EP study tmr   still unclear etiology for this  TTE showed normal EF   O2 sats mid to high 90s on room air  DASH diet  Normal renal function  H/H normal  HSQ for DVT prophylaxis  febrile in the ED, cont abx for now, ID consulted, on airborne precautions though low suspicion   Sugars controlled  No central access

## 2024-04-19 NOTE — PROGRESS NOTE ADULT - ASSESSMENT
58-year-old female with a past medical history of hyperthyroidism on methimazole presenting with right-sided chest pain, cough, headache since Friday. Patient also complaining of chills and sweats. Patient had 1 episode of hemoptysis 2 days ago. associated left-sided headache. Patients coworker had COVID last week. EKG in the ED revealed sinus bradycardia with frequent PVCs. Febrile to 101.2 on admission. RVP and UA negative, CTA negative for PE.  Hypotensive to 80/40 on admission despite 2L of IVF so started on Levophed. Admitted to CICU for further workup.     ED t max 101.2, No leucocytosis, labs unremarkable. started on vancomycin and Zosyn    Hospital course ; Afebrile, abx switched to cefepime. Being ruled out for TB. On dopamine.  UA 4/16 unremarkable  Limited RVP 4/16 Negative  Bcx 4/16 NGTD  MRSA PCR collected  QuantiFeron collected     Procal 0.05  CRP 30      CTA; No PE, dependent atelectasis  Limited ECHO; no pericardia, pleural effusion     #bradycardia   # Fevers   # Cough/ trace blood in sputum  # Hyperthyroidism    - Unclear source of fevers, now found to have parainfluenza  - ct with no sinusitis   - Legionella urine Ag- negative - no pneumonia on CT  low procalcitonin   - Low risk for TB;  r/o TB with AFB X3, Follow up QuantiFeron- now denies hemoptysis   can discontinue the cefepime   - thyroid nodule- outpt workup  normal TSH     Discussed with attending and primary service    Roxana Riley M.D. ,   please reach via teams   If no answer, or after 5PM/ weekends,  then please call  [phone]    ID will follow the patient PRN. Please recontact ID if we can be of further assistance . [phone]

## 2024-04-19 NOTE — PROGRESS NOTE ADULT - SUBJECTIVE AND OBJECTIVE BOX
24H hour events: tele with SR rates ~40-50s with PVCs, intermittently in bigem/trigem. Pt without palpitations/SOB/dizziness. Romanian  ID#[id number]    MEDICATIONS:  heparin   Injectable 5000 Unit(s) SubCutaneous every 8 hours  midodrine 5 milliGRAM(s) Oral every 8 hours  cefepime   IVPB      cefepime   IVPB 1000 milliGRAM(s) IV Intermittent every 8 hours  bisacodyl 5 milliGRAM(s) Oral every 12 hours PRN  polyethylene glycol 3350 17 Gram(s) Oral two times a day  senna 2 Tablet(s) Oral at bedtime  chlorhexidine 2% Cloths 1 Application(s) Topical <User Schedule>    REVIEW OF SYSTEMS:  Complete 12point ROS negative.    PHYSICAL EXAM:  T(C): 36.7 (04-19-24 @ 08:00), Max: 37.1 (04-18-24 @ 19:30)  HR: 52 (04-19-24 @ 10:00) (49 - 115)  BP: 85/54 (04-19-24 @ 10:00) (85/54 - 136/58)  RR: 22 (04-19-24 @ 10:00) (15 - 39)  SpO2: 97% (04-19-24 @ 10:00) (94% - 98%)  Wt(kg): --  I&O's Summary    18 Apr 2024 07:01  -  19 Apr 2024 07:00  --------------------------------------------------------  IN: 1066.5 mL / OUT: 1900 mL / NET: -833.5 mL        Appearance: Normal	  HEENT: NC/AT  Cardiovascular: Normal S1 S2  Respiratory: Lungs clear to auscultation	  Psychiatry: A & O x 3, Mood & affect appropriate  Neurologic: Non-focal  Extremities: No BLE edema    LABS:	 	    CBC Full  -  ( 19 Apr 2024 03:41 )  WBC Count : 6.27 K/uL  Hemoglobin : 13.0 g/dL  Hematocrit : 38.5 %  Platelet Count - Automated : 200 K/uL  Mean Cell Volume : 92.3 fl  Mean Cell Hemoglobin : 31.2 pg  Mean Cell Hemoglobin Concentration : 33.8 gm/dL  Auto Neutrophil # : 1.79 K/uL  Auto Lymphocyte # : 3.87 K/uL  Auto Monocyte # : 0.34 K/uL  Auto Eosinophil # : 0.18 K/uL  Auto Basophil # : 0.04 K/uL  Auto Neutrophil % : 28.6 %  Auto Lymphocyte % : 61.7 %  Auto Monocyte % : 5.4 %  Auto Eosinophil % : 2.9 %  Auto Basophil % : 0.6 %    04-19    137  |  103  |  19  ----------------------------<  81  4.4   |  23  |  0.64  04-18    136  |  101  |  11  ----------------------------<  136<H>  4.3   |  21<L>  |  0.66    Ca    9.6      19 Apr 2024 03:42  Ca    9.3      18 Apr 2024 00:48  Phos  4.0     04-19  Phos  3.9     04-18  Mg     2.2     04-19  Mg     2.0     04-18    TPro  6.4  /  Alb  3.7  /  TBili  0.2  /  DBili  x   /  AST  15  /  ALT  12  /  AlkPhos  80  04-19  TPro  6.4  /  Alb  3.4  /  TBili  0.2  /  DBili  x   /  AST  16  /  ALT  9<L>  /  AlkPhos  91  04-18      proBNP: Pro-Brain Natriuretic Peptide (04.16.24 @ 14:05)    Pro-Brain Natriuretic Peptide: 236 pg/mL  TSH: Free Thyroxine, Serum in AM (04.18.24 @ 00:48)    Free Thyroxine, Serum: 1.1 ng/dL    Thyroid Stimulating Hormone, Serum (04.16.24 @ 14:05)    Thyroid Stimulating Hormone, Serum: 2.43 uIU/mL    CARDIAC MARKERS:Troponin T, High Sensitivity (04.16.24 @ 14:05)    Troponin T, High Sensitivity Result: <6: *  *  Rapid upward or downward changes in high-sensitivity troponin levels  suggest acute myocardial injury. Renal impairment may cause sustained  troponin elevations.  Normal: <6 - 14 ng/L  Indeterminate: 15-51 ng/L  Elevated: > 51 ng/L  See http://labs/test/TROPTHS on the [facility] intranet for more  information ng/L  RADIOLOGY: < from: CT Angio Chest PE Protocol w/ IV Cont (04.16.24 @ 14:33) >  LUNGS AND AIRWAYS: Patent central airways.  Dependent atelectasis.  PLEURA: No pleural effusion.  MEDIASTINUM AND ALANA: No lymphadenopathy.  VESSELS: No pulmonary embolus  HEART: Heart size is normal. No pericardial effusion.  CHEST WALL AND LOWER NECK: Bilateral breast implants  VISUALIZED UPPER ABDOMEN: Within normal limits.  BONES: Within normal limits.    IMPRESSION:  No pulmonary embolus    < end of copied text >  PREVIOUS DIAGNOSTIC TESTING:    [ ] Echocardiogram: < from: TTE W or WO Ultrasound Enhancing Agent (04.17.24 @ 08:25) >  CONCLUSIONS:      1. Left ventricular cavity is mildly dilated. Left ventricular wall thickness is normal. Abnormal (paradoxical) septal motion consistent with conduction delay. Left ventricular systolic function is normal with an ejection fraction of 63 % by Lema's method of disks. Regional wall motion abnormalities present.   2. Entire inferior wall and basal and mid inferior septum are abnormal.   3. Normal left ventricular diastolic function, with normal filling pressure.   4. Normal right ventricular cavity size and normal systolic function.   5. Normal left and right atrial size.   6. No significant valvular disease.   7. Estimated pulmonary artery systolic pressure is 16 mmHg.   8. No pericardial effusion seen.   9. No prior echocardiogram is available for comparison.    < end of copied text >

## 2024-04-19 NOTE — PROGRESS NOTE ADULT - ASSESSMENT
58F Romanian-speaking Hx hyperthyroidism (on methimazole) who presented to the ED w/ R-sided chest pain, cough, headache since Friday a/w chills and sweats and 1 episode of blood-tinged sputum.  Describes episodes of night sweats for weeks. Persistent hypotension despite 3L IVF in ED now requiring pressor support w/ Levophed--->dopamine. Telemetry w/ sinus bradycardia to 50s w/ significant ectopy (bigemy, trigeminy). Transferred to CICU for further care.     #Hyperthyroidism on Methimazole  #Sepsis workup  #Frequent PVCs    - tele with SR rates ~45-60s with intermittent PVCs in bigem/trigem. BP noted ~90-100s/50-60s, low BP noted without ectopy on tele, very unlikely d/t PVCs. Asymptomatic with ectopy.   - Continue Midodrine  - Now off dopamine  - Pt with temp of 101 in ED, WBC normal and labs thus far unremarkable. COVID/FLU (-). ID on board, appreciate recs. Pending Blood cx. Currently on Cefepime and Pip/daisha  - TFTs thus far WNL. Endo following, appreciate recs.   - TTE with LVEF 63%, no RWMA, LV wall thickness normal, LV cavity mildly dilated, no pericardial effusion  - No EP intervention at this time. Workup ongoing. Pending cath and hemodynamic study with Dr. Mosley today.   - Keep on tele. Keep K>4, Mg>2  - Discussed with EP attending and primary team.    58F Persian-speaking Hx hyperthyroidism (on methimazole) who presented to the ED w/ R-sided chest pain, cough, headache since Friday a/w chills and sweats and 1 episode of blood-tinged sputum.  Describes episodes of night sweats for weeks. Persistent hypotension despite 3L IVF in ED now requiring pressor support w/ Levophed--->dopamine. Telemetry w/ sinus bradycardia to 50s w/ significant ectopy (bigemy, trigeminy). Transferred to CICU for further care.     #Hyperthyroidism on Methimazole  #Sepsis workup  #Frequent PVCs    - tele with SR rates ~45-60s with intermittent PVCs in bigem/trigem. BP noted ~90-100s/50-60s, low BP noted without ectopy on tele, very unlikely d/t PVCs. Asymptomatic with ectopy.   - Continue Midodrine  - Now off dopamine  - Pt with temp of 101 in ED, WBC normal and labs thus far unremarkable. COVID/FLU (-). ID on board, appreciate recs. Blood cx NGTD. Currently on Cefepime  - TFTs thus far WNL. Endo following, appreciate recs.   - TTE with LVEF 63%, no RWMA, LV wall thickness normal, LV cavity mildly dilated, no pericardial effusion  - No EP intervention at this time. Workup ongoing. Pending cath and hemodynamic study with Dr. Mosley today.   - Keep on tele. Keep K>4, Mg>2  - Discussed with EP attending and primary team.

## 2024-04-19 NOTE — PROGRESS NOTE ADULT - ASSESSMENT
59yo Upper sorbian-speaking woman with Hx hyperthyroidism (on methimazole) who presented to the ED w/ R-sided chest pain, cough, headache since Friday a/w chills and sweats and 1 episode of blood-tinged sputum.  Describes episodes of night sweats for weeks. Persistent hypotension despite 3L IVF in ED now requiring pressor support w/ Levophed--->dopamine. Telemetry w/ sinus bradycardia to 50s w/ significant ectopy (bigemy, trigeminy). Fever of unknown origin (infectious vs non-infectious). Now S/p dopamine and Devin gtt. Transferred to CICU for further care.     Neuro:   Aox3, no active issues    Cardio:   #Sinus bradycardia with frequent PVCs, hypotension   - TFTs wnl   - Echo EF 63%, septal abnormality, RVF wnl  - Extensive serology ordered to figure out etiology including ESR/CRP (both elevated), HIV (-), RF -, Lyme -, Leptospirosis, ferritin wnl, LONG pend. Further serology below   - EP: trial of Midodrine 5TID, s/p dopamine and phenylephrine gtt   - Pending hemodynamic study and cath with Dr. Mosley 4/19  - EP following for further recs after above workup     Pulmonary:   #Parainfluenza PNA  Not producing sputum currently, hx of fevers, night sweats, episode of hemoptysis   R/o TB on admission, per ID ok to d/c airborne precautions at this time  MRSA negative.  Found to be parainfluenza positive on RVP  ID following    Renal:   No active issues    Endocrine:   #Hyperthyrodism on Methimazole  - TSH, T4, T3 wnl   - AM cortisol decreased, but likely not corrected at right time  - pending TSH receptor ab, TSI  - Thyroid U/S with L thyroid lobe 1.9cm nodule, no sonographic suspicion per report but will need surveillance  - Endocrine recs appreciated, OP endocrine follow up     GI:   #Constipation - improved   - Miralax, senna, dulcolax   - Bowel movement 4/19     ID:   #Fever and hypotension of unclear origin   #Infectious vs non-infectious etiology  Limited RVP and UA negative, CT chest with dependent atelectasis but no apparent consolidation   Empiric coverage with cefepime for 5 day course (till 4/21)  Blood/Ucx NGTD  - r/o TB airborne precautions on admission, per ID ok to d/c at this time   - ID consulted for further recs  - cefepime for empiric coverage, 5 day course per ID  - of note, also has breast implants which may contribute to inflammation seen on testing  - Serological workup performed including: ESR/CRP (both elevated), HIV (-), RF -, Lyme -, Leptospirosis, ferritin wnl, LONG pend, ANCA, dsDNA, C3/C4 wnl, UP/Cr, Serum ACE, SPEP, full RVP, Vit D level reduced  - Found to be parainfluenza positive on RPP  - S/p CTH, CT maxillofacial, and CTAP which only demonstrated inflammation in the paranasal sinuses  - ID recs appreciated      Prophylactic:   SQH Dvt ppx  Diet: Regular   Lines: PIVs

## 2024-04-19 NOTE — PROGRESS NOTE ADULT - NS ATTEND AMEND GEN_ALL_CORE FT
She has responded to midodrine. Parainfuenza may explain her vasodilatation. I don't feel a great need to have her undergo any further hemodynamic studies at this time. No indication for ablation or pacemaker at this time. EP will sign off. Continue midodrine and may increas to 10 mg tid if needed

## 2024-04-19 NOTE — PROGRESS NOTE ADULT - SUBJECTIVE AND OBJECTIVE BOX
PATIENT:  CURT GURROLA  [id number]    CHIEF COMPLAINT:  Patient is a 58y old  Female who presents with a chief complaint of Bradycardia (2024 17:43)      INTERVAL HISTORYOVERNIGHT EVENTS:  Found to be parainfluenza positive  RHC with TVP pacing deferred       REVIEW OF SYSTEMS:    Constitutional:     [x] negative [ ] fevers [ ] chills [ ] weight loss [ ] weight gain  HEENT:                  [x] negative [ ] dry eyes [ ] eye irritation [ ] postnasal drip [ ] nasal congestion  CV:                         [x] negative  [ ] chest pain [ ] orthopnea [ ] palpitations [ ] murmur  Resp:                     [x] negative [ ] cough [ ] shortness of breath [ ] dyspnea [ ] wheezing [ ] sputum [ ] hemoptysis  GI:                          [x] negative [ ] nausea [ ] vomiting [ ] diarrhea [ ] constipation [ ] abd pain [ ] dysphagia   :                        [x] negative [ ] dysuria [ ] nocturia [ ] hematuria [ ] increased urinary frequency  Musculoskeletal: [x] negative [ ] back pain [ ] myalgias [ ] arthralgias [ ] fracture  Skin:                       [x] negative [ ] rash [ ] itch  Neurological:        [x] negative [ ] headache [ ] dizziness [ ] syncope [ ] weakness [ ] numbness  Psychiatric:           [x] negative [ ] anxiety [ ] depression  Endocrine:            [x] negative [ ] diabetes [ ] thyroid problem  Heme/Lymph:      [x] negative [ ] anemia [ ] bleeding problem  Allergic/Immune: [x] negative [ ] itchy eyes [ ] nasal discharge [ ] hives [ ] angioedema    MEDICATIONS:  MEDICATIONS  (STANDING):  chlorhexidine 2% Cloths 1 Application(s) Topical <User Schedule>  heparin   Injectable 5000 Unit(s) SubCutaneous every 12 hours  midodrine 5 milliGRAM(s) Oral every 8 hours  polyethylene glycol 3350 17 Gram(s) Oral two times a day  senna 2 Tablet(s) Oral at bedtime    MEDICATIONS  (PRN):  bisacodyl 5 milliGRAM(s) Oral every 12 hours PRN Constipation      ALLERGIES:  Allergies  No Known Allergies    Intolerances      OBJECTIVE:  ICU Vital Signs Last 24 Hrs  T(C): 36.7 (2024 16:00), Max: 37.1 (2024 19:30)  T(F): 98.1 (2024 16:00), Max: 98.7 (2024 19:30)  HR: 55 (2024 18:00) (51 - 91)  BP: 98/52 (2024 18:00) (81/38 - 136/58)  BP(mean): 73 (2024 18:00) (55 - 86)  ABP: --  ABP(mean): --  RR: 22 (2024 18:00) (15 - 39)  SpO2: 95% (2024 18:00) (93% - 98%)    O2 Parameters below as of 2024 18:00  Patient On (Oxygen Delivery Method): room air      CAPILLARY BLOOD GLUCOSE      I&O's Summary    2024 07:01  -  2024 07:00  --------------------------------------------------------  IN: 1066.5 mL / OUT: 1900 mL / NET: -833.5 mL    2024 07:01  -  2024 19:28  --------------------------------------------------------  IN: 790 mL / OUT: 650 mL / NET: 140 mL      Daily Height in cm: 167.6 (2024 06:09)    Daily Weight in k.4 (2024 06:00)    PHYSICAL EXAMINATION:  GENERAL: No acute distress, well-developed  HEAD:  Atraumatic, Normocephalic  EYES: EOMI, PERRLA  CHEST/LUNG: CTAB; No wheezes, rales, or rhonchi  HEART: osiel, no murmurs appreciated   ABDOMEN: Soft, non-tender, non-distended; normal bowel sounds  EXTREMITIES:  2+ peripheral pulses b/l, No clubbing, cyanosis, or edema  NEUROLOGY: A&O x 3, no focal deficits    LABS:                          13.0   6.27  )-----------( 200      ( 2024 03:41 )             38.5     04-19    137  |  103  |  19  ----------------------------<  81  4.4   |  23  |  0.64    Ca    9.6      2024 03:42  Phos  4.0       Mg     2.2         TPro  6.4  /  Alb  3.7  /  TBili  0.2  /  DBili  x   /  AST  15  /  ALT  12  /  AlkPhos  80  -    LIVER FUNCTIONS - ( 2024 03:42 )  Alb: 3.7 g/dL / Pro: 6.4 g/dL / ALK PHOS: 80 U/L / ALT: 12 U/L / AST: 15 U/L / GGT: x           PT/INR - ( 2024 03:42 )   PT: 9.6 sec;   INR: 0.91 ratio         PTT - ( 2024 03:42 )  PTT:53.5 sec        Urinalysis Basic - ( 2024 03:42 )    Color: x / Appearance: x / SG: x / pH: x  Gluc: 81 mg/dL / Ketone: x  / Bili: x / Urobili: x   Blood: x / Protein: x / Nitrite: x   Leuk Esterase: x / RBC: x / WBC x   Sq Epi: x / Non Sq Epi: x / Bacteria: x

## 2024-04-19 NOTE — PROGRESS NOTE ADULT - ASSESSMENT
A/P:58F Telugu-speaking Hx hyperthyroidism (on methimazole) who presented to the ED w/ R-sided chest pain, cough, headache since Friday a/w chills and sweats and 1 episode of blood-tinged sputum.  Describes episodes of night sweats for weeks. Persistent hypotension despite 3L IVF in ED now requiring pressor support w/ Levophed--->dopamine. Telemetry w/ sinus bradycardia to 50s w/ significant ectopy (bigemy, trigeminy). R/o TB and fever of unknown origin (infectious vs non-infectious). Transferred to CICU for further care.     Neuro:   Aox3, no active issues    Cardio:   #Sinus bradycardia with frequent PVCs, hypotension   - currently on dopamine gtt  - TFTs wnl   - Echo EF 63%, septal abnormality, RVF wnl  - Extensive serology ordered to figure out etiology including ESR/CRP (both elevated), HIV (-), RF -, Lyme -, Leptospirosis -, ferritin wnl, LONG pend  - EP: trial of Midodrine 5TID, NPO@MN in case of procedure, attempt to wean dopamine (s/p yu)  - EP recs appreciated    Pulmonary:   # R/o TB  airborne precautions for now  Not producing sputum currently, hx of fevers, night sweats, episode of hemoptysis   ID consulted for further recs: sputum AFBx3    Renal:   No active issues    Endocrine:   #Hyperthyrodism on Methimazole  - TSH, T4, T3 wnl   - AM cortisol decreased, but likely not corrected at right time  - pending TSH receptor ab, TSI, Thyroid U/S per endocrine  - endocrine recs appreciated     GI:   #Constipation   - miralax, senna, will add on dulcolax     ID:   #Fever and hypotension of unclear origin   #Infectious vs non-infectious etiology  RVP and UA negative, CT chest with dependent atelectasis but no apparent consolidation   Empiric coverage with cefepime   Blood/Ucx NGTD  - r/o TB airborne precautions, will clarify with ID if this is still needed  - ID consulted for further recs, unclear if fevers are from infectious or non-infectious etiology  - cefepime for empiric coverage   - of note, also has breast implants which may contribute to inflammation seen on testing  - Serological workup performed including: ESR/CRP (both elevated), HIV (-), RF -, Lyme -, Leptospirosis -, ferritin wnl, LONG pend, ANCA, dsDNA, C3/C4, UP/Cr, Serum ACE, SPEP, Vit D level  - if ID decides this is non-infectious, will consider rheumatological input for autoimmune workup   - pending CTH, CT maxillofacial, and CTAP for further infectious workup  - ID recs appreciated      Prophylactic:   SQH Dvt ppx  Diet: Regular   Pending clinical improvement   Lines: PIVs    A/P:58F Maori-speaking Hx hyperthyroidism (on methimazole) who presented to the ED w/ R-sided chest pain, cough, headache since Friday a/w chills and sweats and 1 episode of blood-tinged sputum.  Describes episodes of night sweats for weeks. Persistent hypotension despite 3L IVF in ED now requiring pressor support w/ Levophed--->dopamine. Telemetry w/ sinus bradycardia to 50s w/ significant ectopy (bigemy, trigeminy). Fever of unknown origin (infectious vs non-infectious). Now S/p dopamine and Devin gtt. Transferred to CICU for further care.     Neuro:   Aox3, no active issues    Cardio:   #Sinus bradycardia with frequent PVCs, hypotension   - currently on dopamine gtt  - TFTs wnl   - Echo EF 63%, septal abnormality, RVF wnl  - Extensive serology ordered to figure out etiology including ESR/CRP (both elevated), HIV (-), RF -, Lyme -, Leptospirosis, ferritin wnl, LONG pend. Further serology below   - EP: trial of Midodrine 5TID, s/p dopamine and phenylephrine gtt   - Pending hemodynamic study and cath with Dr. Mosley 4/19  - EP following for further recs after above workup     Pulmonary:   #Cough  Not producing sputum currently, hx of fevers, night sweats, episode of hemoptysis   R/o TB on admission, per ID ok to d/c airborne precautions at this time  MRSA negative. Pending full RVP, CTH, CT Maxillofacial for further workup   ID following    Renal:   No active issues    Endocrine:   #Hyperthyrodism on Methimazole  - TSH, T4, T3 wnl   - AM cortisol decreased, but likely not corrected at right time  - pending TSH receptor ab, TSI  - Thyroid U/S with L thyroid lobe 1.9cm nodule, no sonographic suspicion per report but will need surveillance  - endocrine recs appreciated, OP endocrine follow up     GI:   #Constipation- improved   - miralax, senna, will add on dulcolax   - Bowel movement 4/19     ID:   #Fever and hypotension of unclear origin   #Infectious vs non-infectious etiology  Limited RVP and UA negative, CT chest with dependent atelectasis but no apparent consolidation   Empiric coverage with cefepime for 5 day course (till 4/21)  Blood/Ucx NGTD  - r/o TB airborne precautions on admission, per ID ok to d/c at this time   - ID consulted for further recs, unclear if fevers are from infectious or non-infectious etiology  - cefepime for empiric coverage, 5 day course per ID  - of note, also has breast implants which may contribute to inflammation seen on testing  - Serological workup performed including: ESR/CRP (both elevated), HIV (-), RF -, Lyme -, Leptospirosis, ferritin wnl, LONG pend, ANCA, dsDNA, C3/C4 wnl, UP/Cr, Serum ACE, SPEP, full RVP, Vit D level reduced  - if ID decides this is non-infectious, will consider rheumatological input for autoimmune workup   - pending CTH, CT maxillofacial, and CTAP for further infectious workup 4/19   - ID recs appreciated      Prophylactic:   SQH Dvt ppx  Diet: Regular   Pending clinical improvement   Lines: PIVs

## 2024-04-20 LAB
ALBUMIN SERPL ELPH-MCNC: 3.8 G/DL — SIGNIFICANT CHANGE UP (ref 3.3–5)
ALP SERPL-CCNC: 81 U/L — SIGNIFICANT CHANGE UP (ref 40–120)
ALT FLD-CCNC: 12 U/L — SIGNIFICANT CHANGE UP (ref 10–45)
ANION GAP SERPL CALC-SCNC: 12 MMOL/L — SIGNIFICANT CHANGE UP (ref 5–17)
APTT BLD: 29.9 SEC — SIGNIFICANT CHANGE UP (ref 24.5–35.6)
AST SERPL-CCNC: 19 U/L — SIGNIFICANT CHANGE UP (ref 10–40)
BASOPHILS # BLD AUTO: 0.05 K/UL — SIGNIFICANT CHANGE UP (ref 0–0.2)
BASOPHILS NFR BLD AUTO: 0.9 % — SIGNIFICANT CHANGE UP (ref 0–2)
BILIRUB SERPL-MCNC: 0.2 MG/DL — SIGNIFICANT CHANGE UP (ref 0.2–1.2)
BUN SERPL-MCNC: 18 MG/DL — SIGNIFICANT CHANGE UP (ref 7–23)
CALCIUM SERPL-MCNC: 9.4 MG/DL — SIGNIFICANT CHANGE UP (ref 8.4–10.5)
CHLORIDE SERPL-SCNC: 102 MMOL/L — SIGNIFICANT CHANGE UP (ref 96–108)
CO2 SERPL-SCNC: 24 MMOL/L — SIGNIFICANT CHANGE UP (ref 22–31)
CREAT SERPL-MCNC: 0.63 MG/DL — SIGNIFICANT CHANGE UP (ref 0.5–1.3)
EGFR: 101 ML/MIN/1.73M2 — SIGNIFICANT CHANGE UP
EOSINOPHIL # BLD AUTO: 0.12 K/UL — SIGNIFICANT CHANGE UP (ref 0–0.5)
EOSINOPHIL NFR BLD AUTO: 2.1 % — SIGNIFICANT CHANGE UP (ref 0–6)
GLUCOSE SERPL-MCNC: 87 MG/DL — SIGNIFICANT CHANGE UP (ref 70–99)
HCT VFR BLD CALC: 38.6 % — SIGNIFICANT CHANGE UP (ref 34.5–45)
HGB BLD-MCNC: 12.5 G/DL — SIGNIFICANT CHANGE UP (ref 11.5–15.5)
IMM GRANULOCYTES NFR BLD AUTO: 1 % — HIGH (ref 0–0.9)
INR BLD: 0.89 RATIO — SIGNIFICANT CHANGE UP (ref 0.85–1.18)
LYMPHOCYTES # BLD AUTO: 3.51 K/UL — HIGH (ref 1–3.3)
LYMPHOCYTES # BLD AUTO: 60 % — HIGH (ref 13–44)
MAGNESIUM SERPL-MCNC: 2.4 MG/DL — SIGNIFICANT CHANGE UP (ref 1.6–2.6)
MCHC RBC-ENTMCNC: 30.5 PG — SIGNIFICANT CHANGE UP (ref 27–34)
MCHC RBC-ENTMCNC: 32.4 GM/DL — SIGNIFICANT CHANGE UP (ref 32–36)
MCV RBC AUTO: 94.1 FL — SIGNIFICANT CHANGE UP (ref 80–100)
MONOCYTES # BLD AUTO: 0.3 K/UL — SIGNIFICANT CHANGE UP (ref 0–0.9)
MONOCYTES NFR BLD AUTO: 5.1 % — SIGNIFICANT CHANGE UP (ref 2–14)
NEUTROPHILS # BLD AUTO: 1.81 K/UL — SIGNIFICANT CHANGE UP (ref 1.8–7.4)
NEUTROPHILS NFR BLD AUTO: 30.9 % — LOW (ref 43–77)
NRBC # BLD: 0 /100 WBCS — SIGNIFICANT CHANGE UP (ref 0–0)
PHOSPHATE SERPL-MCNC: 4.2 MG/DL — SIGNIFICANT CHANGE UP (ref 2.5–4.5)
PLATELET # BLD AUTO: 211 K/UL — SIGNIFICANT CHANGE UP (ref 150–400)
POTASSIUM SERPL-MCNC: 4.4 MMOL/L — SIGNIFICANT CHANGE UP (ref 3.5–5.3)
POTASSIUM SERPL-SCNC: 4.4 MMOL/L — SIGNIFICANT CHANGE UP (ref 3.5–5.3)
PROT SERPL-MCNC: 6.4 G/DL — SIGNIFICANT CHANGE UP (ref 6–8.3)
PROTHROM AB SERPL-ACNC: 9.8 SEC — SIGNIFICANT CHANGE UP (ref 9.5–13)
RBC # BLD: 4.1 M/UL — SIGNIFICANT CHANGE UP (ref 3.8–5.2)
RBC # FLD: 12.5 % — SIGNIFICANT CHANGE UP (ref 10.3–14.5)
SODIUM SERPL-SCNC: 138 MMOL/L — SIGNIFICANT CHANGE UP (ref 135–145)
TSH RECEP AB FLD-ACNC: <1.1 IU/L — SIGNIFICANT CHANGE UP (ref 0–1.75)
TSI ACT/NOR SER: <0.1 IU/L — SIGNIFICANT CHANGE UP (ref 0–0.55)
WBC # BLD: 5.85 K/UL — SIGNIFICANT CHANGE UP (ref 3.8–10.5)
WBC # FLD AUTO: 5.85 K/UL — SIGNIFICANT CHANGE UP (ref 3.8–10.5)

## 2024-04-20 PROCEDURE — 93010 ELECTROCARDIOGRAM REPORT: CPT

## 2024-04-20 PROCEDURE — 99291 CRITICAL CARE FIRST HOUR: CPT

## 2024-04-20 PROCEDURE — 99292 CRITICAL CARE ADDL 30 MIN: CPT

## 2024-04-20 RX ORDER — LANOLIN ALCOHOL/MO/W.PET/CERES
1 CREAM (GRAM) TOPICAL AT BEDTIME
Refills: 0 | Status: DISCONTINUED | OUTPATIENT
Start: 2024-04-20 | End: 2024-04-22

## 2024-04-20 RX ORDER — HEPARIN SODIUM 5000 [USP'U]/ML
5000 INJECTION INTRAVENOUS; SUBCUTANEOUS EVERY 8 HOURS
Refills: 0 | Status: DISCONTINUED | OUTPATIENT
Start: 2024-04-20 | End: 2024-04-22

## 2024-04-20 RX ADMIN — Medication 1 MILLIGRAM(S): at 21:35

## 2024-04-20 RX ADMIN — POLYETHYLENE GLYCOL 3350 17 GRAM(S): 17 POWDER, FOR SOLUTION ORAL at 05:30

## 2024-04-20 RX ADMIN — HEPARIN SODIUM 5000 UNIT(S): 5000 INJECTION INTRAVENOUS; SUBCUTANEOUS at 21:25

## 2024-04-20 RX ADMIN — HEPARIN SODIUM 5000 UNIT(S): 5000 INJECTION INTRAVENOUS; SUBCUTANEOUS at 13:04

## 2024-04-20 RX ADMIN — POLYETHYLENE GLYCOL 3350 17 GRAM(S): 17 POWDER, FOR SOLUTION ORAL at 17:16

## 2024-04-20 RX ADMIN — MIDODRINE HYDROCHLORIDE 5 MILLIGRAM(S): 2.5 TABLET ORAL at 05:30

## 2024-04-20 RX ADMIN — HEPARIN SODIUM 5000 UNIT(S): 5000 INJECTION INTRAVENOUS; SUBCUTANEOUS at 05:30

## 2024-04-20 RX ADMIN — SENNA PLUS 2 TABLET(S): 8.6 TABLET ORAL at 21:24

## 2024-04-20 NOTE — PROGRESS NOTE ADULT - ASSESSMENT
58-year-old female with a past medical history of hyperthyroidism on methimazole presenting with right-sided chest pain, cough, headache since Friday found to have parainfluenxa also w bigeminy and bradycardia    Neuro:   Aox3, no active issues    Cardio:   #Sinus bradycardia with frequent PVCs, hypotension   - TFTs wnl   - Echo EF 63%, septal abnormality, RVF wnl  - Extensive serology ordered to figure out etiology including HIV (-), RF -, Lyme -, Leptospirosis, ferritin wnl, LONG pend.   - Now off Midrodine continue to monitor BP. Hypotension could've been in the setting of parainfluenza.   - EP following for further recs there is discussion of a possible EP study    Pulmonary:   #Parainfluenza PNA  Not producing sputum currently, hx of fevers, night sweats, episode of hemoptysis   R/o TB on admission, per ID ok to d/c airborne precautions at this time  MRSA negative.  Found to be parainfluenza positive on RVP  ID following    Renal:   No active issues    Endocrine:   #Hyperthyrodism on Methimazole  - TSH, T4, T3 wnl   - Thyroid U/S with L thyroid lobe 1.9cm nodule, no sonographic suspicion per report but will need surveillance  - Endocrine recs appreciated, OP endocrine follow up     GI:   #Constipation - improved   - Miralax, senna, dulcolax   - Bowel movement 4/19     ID:   #Fever and hypotension of unclear origin   #Infectious vs non-infectious etiology  Limited RVP and UA negative, CT chest with dependent atelectasis but no apparent consolidation   Empiric coverage with cefepime for 5 day course (till 4/21)  Blood/Ucx NGTD  - r/o TB airborne precautions on admission, per ID ok to d/c at this time   - ID consulted for further recs  - Found to be parainfluenza positive on RPP  - S/p CTH, CT maxillofacial, and CTAP which only demonstrated inflammation in the paranasal sinuses    Prophylactic:   SQH Dvt ppx  Diet: Regular   Lines: PIVs     Dispo: Stable for transfer to telemetry if blood pressures remain within normal limits off midodrine 58-year-old female with a past medical history of hyperthyroidism on methimazole presenting with right-sided chest pain, cough, headache since Friday found to have parainfluenxa also w bigeminy and bradycardia    Neuro:   Aox3, no active issues    Cardio:   #Sinus bradycardia with frequent PVCs, hypotension   - TFTs wnl   - Echo EF 63%, septal abnormality, RVF wnl  - Extensive serology ordered to figure out etiology including HIV (-), RF -, Lyme -, Leptospirosis, ferritin wnl, LONG pend.   - Now off Midrodine continue to monitor BP. Hypotension could've been in the setting of parainfluenza.   - EP following for further recs there is discussion of a possible EP study    Pulmonary:   #Parainfluenza PNA  Not producing sputum currently, hx of fevers, night sweats, episode of hemoptysis   R/o TB on admission, per ID ok to d/c airborne precautions at this time  MRSA negative.  Found to be parainfluenza positive on RVP  ID following    Renal:   No active issues    Endocrine:   #Hyperthyrodism on Methimazole  - TSH, T4, T3 wnl   - Thyroid U/S with L thyroid lobe 1.9cm nodule, no sonographic suspicion per report but will need surveillance  - Endocrine recs appreciated, OP endocrine follow up     GI:   #Constipation - improved   - Miralax, senna, dulcolax   - Bowel movement 4/19     ID:   #Fever and hypotension of unclear origin   #Infectious vs non-infectious etiology  Limited RVP and UA negative, CT chest with dependent atelectasis but no apparent consolidation   Empiric coverage with cefepime for 5 day course (till 4/21)  Blood/Ucx NGTD  - r/o TB airborne precautions on admission, per ID ok to d/c at this time   - ID consulted for further recs  - Found to be parainfluenza positive on RPP  - S/p CTH, CT maxillofacial, and CTAP which only demonstrated inflammation in the paranasal sinuses    Prophylactic:   SQH Dvt ppx  Diet: Regular   Lines: PIVs

## 2024-04-20 NOTE — PROGRESS NOTE ADULT - SUBJECTIVE AND OBJECTIVE BOX
HPI:  58-year-old female with a past medical history of hyperthyroidism on methimazole presenting with right-sided chest pain, cough, headache since Friday. Patient also complaining of chills and sweats. Patient had 1 episode of hemoptysis 2 days ago. associated left-sided headache. Patients coworker had COVID last week. EKG in the ED revealed sinus bradycardia with frequent PVCs. Febrile to 101.2 on admission. RVP and UA negative, CTA negative for PE. Also reports taking Methimazole at home without consistent appts or TSH being checked with question of methimazole toxicity. Hypotensive to 80/40 on admission despite 2L of IVF so started on Levophed. EP consulted and said no need for TVP at this time. Admitted to CICU for further workup. (2024 19:11)      Events:    Review Of Systems:  Constitutional: denies fever, chills, Fatigue   HEENT: denies Blurred vision, Eye Pain, Headache   Respiratory: denies Cough, Wheezing , Shortness of breath  Cardiovascular: denies Chest Pain, Palpitations,  GONZALEZ   Gastrointestinal: denies Abdominal Pain, Diarrhea, Constipation   Genitourinary: denies Nocturia, Dysuria, Incontinence  Extremities: denies Swelling, Joint Pain  Neurologic: denies Focal deficit, Paresthesias, Syncope  Lymphatic: denies Swelling, Lymphadenopathy   Skin: denies Rash, Ecchymoses, Wounds   Psychiatry: denies Depression, Suicidal/Homicidal Ideation, anxiety  [X ] 10 point review of systems is otherwise negative except as mentioned above         Medications:  bisacodyl 5 milliGRAM(s) Oral every 12 hours PRN  heparin   Injectable 5000 Unit(s) SubCutaneous every 12 hours  midodrine 5 milliGRAM(s) Oral every 8 hours  polyethylene glycol 3350 17 Gram(s) Oral two times a day  senna 2 Tablet(s) Oral at bedtime    PMH/PSH/FH/SH: [ ] Unchanged  Vitals:  T(C): 37 (24 @ 03:00), Max: 37 (24 @ 03:00)  HR: 54 (24 @ 06:00) (44 - 73)  BP: 93/46 (24 @ 06:00) (81/38 - 116/55)  BP(mean): 66 (04-20-24 @ 06:00) (55 - 78)  RR: 23 (24 @ 06:00) (15 - 33)  SpO2: 95% (24 @ 06:00) (93% - 97%)  Wt(kg): --  Daily     Daily Weight in k.7 (2024 05:00)  I&O's Summary    2024 07:01  -  2024 07:00  --------------------------------------------------------  IN: 1066.5 mL / OUT: 1900 mL / NET: -833.5 mL    2024 07:01  -  2024 06:57  --------------------------------------------------------  IN: 1150 mL / OUT: 900 mL / NET: 250 mL        Physical Exam:  Appearance: [ ] Normal [ ] NAD  Eyes: [ ] PERRL [ ] EOMI  HENT: [ ] Normal oral muscosa [ ]NC/AT  Cardiovascular: [ ] S1 [ ] S2 [ ] RRR [ ] No m/r/g [ ]No edema [ ] JVP  Procedural Access Site: [ ] No hematoma [ ] Non-tender to palpation [ ] 2+ pulse [ ] No bruit [ ] No Ecchymosis  Respiratory: [ ] Clear to auscultation bilaterally  Gastrointestinal: [ ] Soft [ ] Non-tender [ ] Non-distended [ ] BS+  Musculoskeletal: [ ] No clubbing [ ] No joint deformity   Neurologic: [ ] Non-focal  Lymphatic: [ ] No lymphadenopathy  Psychiatry: [ ] AAOx3 [ ] Mood & affect appropriate  Skin: [ ] No rashes [ ] No ecchymoses [ ] No cyanosis        138  |  102  |  18  ----------------------------<  87  4.4   |  24  |  0.63    Ca    9.4      2024 00:56  Phos  4.2     04-20  Mg     2.4     04-20    TPro  6.4  /  Alb  3.8  /  TBili  0.2  /  DBili  x   /  AST  19  /  ALT  12  /  AlkPhos  81  04-20    PT/INR - ( 2024 00:56 )   PT: 9.8 sec;   INR: 0.89 ratio         PTT - ( 2024 00:56 )  PTT:29.9 sec              ECG:    Echo:    Stress Testing:     Cath:    Imaging:    Interpretation of Telemetry:      Assessment:       Neuro/Psych:    Cardiovascular    Lines:    Pulmonary    Gastrointestinal    Genitourinary    Renal    ID    Hematological    Endocrine                    HPI:  58-year-old female with a past medical history of hyperthyroidism on methimazole presenting with right-sided chest pain, cough, headache since Friday. Patient also complaining of chills and sweats. Patient had 1 episode of hemoptysis 2 days ago. associated left-sided headache. Patients coworker had COVID last week. EKG in the ED revealed sinus bradycardia with frequent PVCs. Febrile to 101.2 on admission. RVP and UA negative, CTA negative for PE. Also reports taking Methimazole at home without consistent appts or TSH being checked with question of methimazole toxicity. Hypotensive to 80/40 on admission despite 2L of IVF so started on Levophed. EP consulted and said no need for TVP at this time. Admitted to CICU for further workup. (2024 19:11). She was started on midodrine and found to be parainfluenza positive.     Events: No acute events overnight. SBP stable. Midodrine discontinued.     Review Of Systems:  Constitutional: denies fever, chills, Fatigue   HEENT: denies Blurred vision, Eye Pain, Headache   Respiratory: denies Cough, Wheezing , Shortness of breath  Cardiovascular: denies Chest Pain, Palpitations,  GONZALEZ   Gastrointestinal: denies Abdominal Pain, Diarrhea, Constipation   Genitourinary: denies Nocturia, Dysuria, Incontinence  Extremities: denies Swelling, Joint Pain  Neurologic: denies Focal deficit, Paresthesias, Syncope  Lymphatic: denies Swelling, Lymphadenopathy   Skin: denies Rash, Ecchymoses, Wounds   Psychiatry: denies Depression, Suicidal/Homicidal Ideation, anxiety  [X ] 10 point review of systems is otherwise negative except as mentioned above         Medications:  bisacodyl 5 milliGRAM(s) Oral every 12 hours PRN  heparin   Injectable 5000 Unit(s) SubCutaneous every 12 hours  midodrine 5 milliGRAM(s) Oral every 8 hours  polyethylene glycol 3350 17 Gram(s) Oral two times a day  senna 2 Tablet(s) Oral at bedtime    Vitals:  T(C): 37 (24 @ 03:00), Max: 37 (24 @ 03:00)  HR: 54 (24 @ 06:00) (44 - 73)  BP: 93/46 (24 @ 06:00) (81/38 - 116/55)  BP(mean): 66 (24 @ 06:00) (55 - 78)  RR: 23 (24 @ 06:00) (15 - 33)  SpO2: 95% (24 @ 06:00) (93% - 97%)    Daily     Daily Weight in k.7 (2024 05:00)  I&O's Summary    2024 07:01  -  2024 07:00  --------------------------------------------------------  IN: 1066.5 mL / OUT: 1900 mL / NET: -833.5 mL    2024 07:01  -  2024 06:57  --------------------------------------------------------  IN: 1150 mL / OUT: 900 mL / NET: 250 mL    Physical Exam:  Appearance: [X ] Normal [ X] NAD  Eyes: [X ] PERRL [ X] EOMI  HENT: [X ] Normal oral muscosa [ X]NC/AT  Cardiovascular: [X ] S1 [X ] S2 [X ] RRR . No JVP, no edema  Respiratory: [ X] Clear to auscultation bilaterally  Gastrointestinal: [ X] Soft [X ] Non-tender [X ] Non-distended   Musculoskeletal: [ X] No clubbing [ X] No joint deformity   Neurologic: [ X] Non-focal  Lymphatic: [X ] No lymphadenopathy  Psychiatry: [X ] AAOx3 [ X] Mood & affect appropriate  Skin: [X ] No rashes [ X] No ecchymoses [X] No cyanosis    -    138  |  102  |  18  ----------------------------<  87  4.4   |  24  |  0.63    Ca    9.4      2024 00:56  Phos  4.2     04-20  Mg     2.4     04-20    TPro  6.4  /  Alb  3.8  /  TBili  0.2  /  DBili  x   /  AST  19  /  ALT  12  /  AlkPhos  81  -    PT/INR - ( 2024 00:56 )   PT: 9.8 sec;   INR: 0.89 ratio      PTT - ( 2024 00:56 )  PTT:29.9 sec    ECG: < from: 12 Lead ECG (24 @ 09:05) >  Diagnosis Line SINUS RHYTHM WITH FREQUENT PREMATURE VENTRICULAR COMPLEXES    Echo: < from: TTE W or WO Ultrasound Enhancing Agent (24 @ 08:25) >      1. Left ventricular cavity is mildly dilated. Left ventricular wall thickness is normal. Abnormal (paradoxical) septal motion consistent with conduction delay. Left ventricular systolic function is normal with an ejection fraction of 63 % by Lema's method of disks. Regional wall motion abnormalities present.   2. Entire inferior wall and basal and mid inferior septum are abnormal.   3. Normal left ventricular diastolic function, with normal filling pressure.   4. Normal right ventricular cavity size and normal systolic function.   5. Normal left and right atrial size.   6. No significant valvular disease.   7. Estimated pulmonary artery systolic pressure is 16 mmHg.   8. No pericardial effusion seen.   9. No prior echocardiogram is available for comparison.    Cath:    Imaging:    Interpretation of Telemetry:      Assessment:       Neuro/Psych:    Cardiovascular    Lines:    Pulmonary    Gastrointestinal    Genitourinary    Renal    ID    Hematological    Endocrine                    HPI:  58-year-old female with a past medical history of hyperthyroidism on methimazole presenting with right-sided chest pain, cough, headache since Friday. Patient also complaining of chills and sweats. Patient had 1 episode of hemoptysis 2 days ago. associated left-sided headache. Patients coworker had COVID last week. EKG in the ED revealed sinus bradycardia with frequent PVCs. Febrile to 101.2 on admission. RVP and UA negative, CTA negative for PE. Also reports taking Methimazole at home without consistent appts or TSH being checked with question of methimazole toxicity. Hypotensive to 80/40 on admission despite 2L of IVF so started on Levophed. EP consulted and said no need for TVP at this time. Admitted to CICU for further workup. (2024 19:11). She was started on midodrine and found to be parainfluenza positive.     Events: No acute events overnight. SBP stable. Midodrine discontinued.     Review Of Systems:  Constitutional: denies fever, chills, Fatigue   HEENT: denies Blurred vision, Eye Pain, Headache   Respiratory: denies Cough, Wheezing , Shortness of breath  Cardiovascular: denies Chest Pain, Palpitations,  GONZALEZ   Gastrointestinal: denies Abdominal Pain, Diarrhea, Constipation   Genitourinary: denies Nocturia, Dysuria, Incontinence  Extremities: denies Swelling, Joint Pain  Neurologic: denies Focal deficit, Paresthesias, Syncope  Lymphatic: denies Swelling, Lymphadenopathy   Skin: denies Rash, Ecchymoses, Wounds   Psychiatry: denies Depression, Suicidal/Homicidal Ideation, anxiety  [X ] 10 point review of systems is otherwise negative except as mentioned above         Medications:  bisacodyl 5 milliGRAM(s) Oral every 12 hours PRN  heparin   Injectable 5000 Unit(s) SubCutaneous every 12 hours  midodrine 5 milliGRAM(s) Oral every 8 hours  polyethylene glycol 3350 17 Gram(s) Oral two times a day  senna 2 Tablet(s) Oral at bedtime    Vitals:  T(C): 37 (24 @ 03:00), Max: 37 (24 @ 03:00)  HR: 54 (24 @ 06:00) (44 - 73)  BP: 93/46 (24 @ 06:00) (81/38 - 116/55)  BP(mean): 66 (24 @ 06:00) (55 - 78)  RR: 23 (24 @ 06:00) (15 - 33)  SpO2: 95% (24 @ 06:00) (93% - 97%)    Daily     Daily Weight in k.7 (2024 05:00)  I&O's Summary    2024 07:01  -  2024 07:00  --------------------------------------------------------  IN: 1066.5 mL / OUT: 1900 mL / NET: -833.5 mL    2024 07:01  -  2024 06:57  --------------------------------------------------------  IN: 1150 mL / OUT: 900 mL / NET: 250 mL    Physical Exam:  Appearance: [X ] Normal [ X] NAD  Eyes: [X ] PERRL [ X] EOMI  HENT: [X ] Normal oral muscosa [ X]NC/AT  Cardiovascular: [X ] S1 [X ] S2 [X ] RRR . No JVP, no edema  Respiratory: [ X] Clear to auscultation bilaterally  Gastrointestinal: [ X] Soft [X ] Non-tender [X ] Non-distended   Musculoskeletal: [ X] No clubbing [ X] No joint deformity   Neurologic: [ X] Non-focal  Lymphatic: [X ] No lymphadenopathy  Psychiatry: [X ] AAOx3 [ X] Mood & affect appropriate  Skin: [X ] No rashes [ X] No ecchymoses [X] No cyanosis    -    138  |  102  |  18  ----------------------------<  87  4.4   |  24  |  0.63    Ca    9.4      2024 00:56  Phos  4.2     04-20  Mg     2.4     04-20    TPro  6.4  /  Alb  3.8  /  TBili  0.2  /  DBili  x   /  AST  19  /  ALT  12  /  AlkPhos  81  -    PT/INR - ( 2024 00:56 )   PT: 9.8 sec;   INR: 0.89 ratio      PTT - ( 2024 00:56 )  PTT:29.9 sec    ECG: < from: 12 Lead ECG (24 @ 09:05) >  Diagnosis Line SINUS RHYTHM WITH FREQUENT PREMATURE VENTRICULAR COMPLEXES    Echo: < from: TTE W or WO Ultrasound Enhancing Agent (24 @ 08:25) >      1. Left ventricular cavity is mildly dilated. Left ventricular wall thickness is normal. Abnormal (paradoxical) septal motion consistent with conduction delay. Left ventricular systolic function is normal with an ejection fraction of 63 % by Lema's method of disks. Regional wall motion abnormalities present.   2. Entire inferior wall and basal and mid inferior septum are abnormal.   3. Normal left ventricular diastolic function, with normal filling pressure.   4. Normal right ventricular cavity size and normal systolic function.   5. Normal left and right atrial size.   6. No significant valvular disease.   7. Estimated pulmonary artery systolic pressure is 16 mmHg.   8. No pericardial effusion seen.   9. No prior echocardiogram is available for comparison.    Imaging: < from: CT Angio Chest PE Protocol w/ IV Cont (24 @ 14:33) >  IMPRESSION:  No pulmonary embolus    Interpretation of Telemetry: Sadia Isaac               HPI:  58-year-old female with a past medical history of hyperthyroidism on methimazole presenting with right-sided chest pain, cough, headache since Friday. Patient also complaining of chills and sweats. Patient had 1 episode of hemoptysis 2 days ago. associated left-sided headache. Patients coworker had COVID last week. EKG in the ED revealed sinus bradycardia with frequent PVCs. Febrile to 101.2 on admission. RVP and UA negative, CTA negative for PE. Also reports taking Methimazole at home without consistent appts or TSH being checked with question of methimazole toxicity. Hypotensive to 80/40 on admission despite 2L of IVF so started on Levophed. EP consulted and said no need for TVP at this time. Admitted to CICU for further workup. (2024 19:11). She was started on midodrine and found to be parainfluenza positive.     Events: No acute events overnight. SBP stable. Midodrine discontinued.     Review Of Systems:  Constitutional: denies fever, chills, Fatigue   HEENT: denies Blurred vision, Eye Pain, Headache   Respiratory: denies Cough, Wheezing , Shortness of breath  Cardiovascular: denies Chest Pain, Palpitations,  GONZALEZ   Gastrointestinal: denies Abdominal Pain, Diarrhea, Constipation   Genitourinary: denies Nocturia, Dysuria, Incontinence  Extremities: denies Swelling, Joint Pain  Neurologic: denies Focal deficit, Paresthesias, Syncope  Lymphatic: denies Swelling, Lymphadenopathy   Skin: denies Rash, Ecchymoses, Wounds   Psychiatry: denies Depression, Suicidal/Homicidal Ideation, anxiety  [X ] 10 point review of systems is otherwise negative except as mentioned above         Medications:  bisacodyl 5 milliGRAM(s) Oral every 12 hours PRN  heparin   Injectable 5000 Unit(s) SubCutaneous every 12 hours  midodrine 5 milliGRAM(s) Oral every 8 hours  polyethylene glycol 3350 17 Gram(s) Oral two times a day  senna 2 Tablet(s) Oral at bedtime    Vitals:  T(C): 37 (24 @ 03:00), Max: 37 (24 @ 03:00)  HR: 54 (24 @ 06:00) (44 - 73)  BP: 93/46 (24 @ 06:00) (81/38 - 116/55)  BP(mean): 66 (24 @ 06:00) (55 - 78)  RR: 23 (24 @ 06:00) (15 - 33)  SpO2: 95% (24 @ 06:00) (93% - 97%)    Daily     Daily Weight in k.7 (2024 05:00)  I&O's Summary    2024 07:01  -  2024 07:00  --------------------------------------------------------  IN: 1066.5 mL / OUT: 1900 mL / NET: -833.5 mL    2024 07:01  -  2024 06:57  --------------------------------------------------------  IN: 1150 mL / OUT: 900 mL / NET: 250 mL    Physical Exam:  Appearance: [X ] Normal [ X] NAD  Eyes: [X ] PERRL [ X] EOMI  HENT: [X ] Normal oral muscosa [ X]NC/AT  Cardiovascular: [X ] S1 [X ] S2 [X ] RRR . No JVP, no edema  Respiratory: [ X] Clear to auscultation bilaterally  Gastrointestinal: [ X] Soft [X ] Non-tender [X ] Non-distended   Musculoskeletal: [ X] No clubbing [ X] No joint deformity   Neurologic: [ X] Non-focal  Psychiatry: [X ] AAOx3 [ X] Mood & affect appropriate  Skin: [X ] No rashes     -    138  |  102  |  18  ----------------------------<  87  4.4   |  24  |  0.63    Ca    9.4      2024 00:56  Phos  4.2     04-20  Mg     2.4     -20    TPro  6.4  /  Alb  3.8  /  TBili  0.2  /  DBili  x   /  AST  19  /  ALT  12  /  AlkPhos  81  04-20    PT/INR - ( 2024 00:56 )   PT: 9.8 sec;   INR: 0.89 ratio      PTT - ( 2024 00:56 )  PTT:29.9 sec    ECG: < from: 12 Lead ECG (24 @ 09:05) >  Diagnosis Line SINUS RHYTHM WITH FREQUENT PREMATURE VENTRICULAR COMPLEXES    Echo: < from: TTE W or WO Ultrasound Enhancing Agent (24 @ 08:25) >      1. Left ventricular cavity is mildly dilated. Left ventricular wall thickness is normal. Abnormal (paradoxical) septal motion consistent with conduction delay. Left ventricular systolic function is normal with an ejection fraction of 63 % by Lema's method of disks. Regional wall motion abnormalities present.   2. Entire inferior wall and basal and mid inferior septum are abnormal.   3. Normal left ventricular diastolic function, with normal filling pressure.   4. Normal right ventricular cavity size and normal systolic function.   5. Normal left and right atrial size.   6. No significant valvular disease.   7. Estimated pulmonary artery systolic pressure is 16 mmHg.   8. No pericardial effusion seen.   9. No prior echocardiogram is available for comparison.    Imaging: < from: CT Angio Chest PE Protocol w/ IV Cont (24 @ 14:33) >  IMPRESSION:  No pulmonary embolus    Interpretation of Telemetry: Sadia Isaac

## 2024-04-20 NOTE — CHART NOTE - NSCHARTNOTEFT_GEN_A_CORE
CICU Transfer Note    Transfer from: CICU    Transfer to: (  ) Medicine    ( X ) Telemetry     (   ) RCU        (    ) Palliative         (   ) Stroke Unit       (  ) MICU       Accepting Physician: ______________    Signout given to: ______________    HPI:  58-year-old female with a past medical history of hyperthyroidism on methimazole presenting with right-sided chest pain, cough, headache since Friday. Patient also complaining of chills and sweats. Patient had 1 episode of hemoptysis 2 days ago. associated left-sided headache. Patients coworker had COVID last week. EKG in the ED revealed sinus bradycardia with frequent PVCs. Febrile to 101.2 on admission. RVP and UA negative, CTA negative for PE. Also reports taking Methimazole at home without consistent appts or TSH being checked with question of methimazole toxicity. Hypotensive to 80/40 on admission despite 2L of IVF so started on Levophed. EP consulted and said no need for TVP at this time. Admitted to CICU for further workup (16 Apr 2024 19:11). She was started on midodrine and found to be parainfluenza positive.     CICU COURSE:  EP consulted for possible device placement. Pending LHC & RHC with possible TVP study. Endo & ID following.     FOR FOLLOW UP:  [] LHC & RHC  [] f/u serological workup  [] f/u EP reccs  [] f/u Endo reccs  [] f/u ID reccs    Shu Sanchez PA-C CICU Transfer Note    Transfer from: CICU    Transfer to: (  ) Medicine    ( X ) Telemetry     (   ) RCU        (    ) Palliative         (   ) Stroke Unit       (  ) MICU       Accepting Physician: Maryanne (NATASHA ly)    Signout given to: Hospitalist & ACP ____________    HPI:  58-year-old female with a past medical history of hyperthyroidism on methimazole presenting with right-sided chest pain, cough, headache since Friday. Patient also complaining of chills and sweats. Patient had 1 episode of hemoptysis 2 days ago. associated left-sided headache. Patients coworker had COVID last week. EKG in the ED revealed sinus bradycardia with frequent PVCs. Febrile to 101.2 on admission. RVP and UA negative, CTA negative for PE. Also reports taking Methimazole at home without consistent appts or TSH being checked with question of methimazole toxicity. Hypotensive to 80/40 on admission despite 2L of IVF so started on Levophed. EP consulted and said no need for TVP at this time. Admitted to CICU for further workup (16 Apr 2024 19:11). She was started on midodrine and found to be parainfluenza positive.    CICU COURSE:  Admitted to CICU on Levophed. Dopamine weaned off. EP consulted for possible device placement. Pending LHC & RHC with possible TVP study. Endo & ID following.    FOR FOLLOW UP:  [] LHC & RHC  [] f/u serological workup  [] f/u EP reccs  [] f/u Endo reccs  [] f/u ID reccs    Shu Sanchez PA-C CICU Transfer Note    Transfer from: CICU    Transfer to: (  ) Medicine    ( X ) Telemetry     (   ) RCU        (    ) Palliative         (   ) Stroke Unit       (  ) MICU       Accepting Physician: Maryanne (NATASHA ly)    Signout given to: Hospitalist & Night NATASHA [id number]    HPI:  58-year-old female with a past medical history of hyperthyroidism on methimazole presenting with right-sided chest pain, cough, headache since Friday. Patient also complaining of chills and sweats. Patient had 1 episode of hemoptysis 2 days ago. associated left-sided headache. Patients coworker had COVID last week. EKG in the ED revealed sinus bradycardia with frequent PVCs. Febrile to 101.2 on admission. RVP and UA negative, CTA negative for PE. Also reports taking Methimazole at home without consistent appts or TSH being checked with question of methimazole toxicity. Hypotensive to 80/40 on admission despite 2L of IVF so started on Levophed. EP consulted and said no need for TVP at this time. Admitted to CICU for further workup (16 Apr 2024 19:11). She was started on midodrine and found to be parainfluenza positive.    CICU COURSE:  Admitted to CICU on Levophed. Dopamine weaned off. EP consulted for possible device placement. Pending LHC & RHC with possible TVP study. Endo & ID following.    FOR FOLLOW UP:  [] LHC & RHC  [] f/u serological workup  [] f/u EP reccs  [] f/u Endo reccs  [] f/u ID reccs    Shu Sanchez PA-C CICU Transfer Note    Transfer from: CICU    Transfer to: (  ) Medicine    ( X )  Telemetry  [id number]    Accepting Physician: Maryanne (NATASHA ly)    Signout given to: Hospitalist & Night NATASHA [id number]    HPI:  58-year-old female with a past medical history of hyperthyroidism on methimazole presenting with right-sided chest pain, cough, headache since Friday. Patient also complaining of chills and sweats. Patient had 1 episode of hemoptysis 2 days ago. associated left-sided headache. Patients coworker had COVID last week. EKG in the ED revealed sinus bradycardia with frequent PVCs. Febrile to 101.2 on admission. RVP and UA negative, CTA negative for PE. Also reports taking Methimazole at home without consistent appts or TSH being checked with question of methimazole toxicity. Hypotensive to 80/40 on admission despite 2L of IVF so started on Levophed. EP consulted and said no need for TVP at this time. Admitted to CICU for further workup (16 Apr 2024 19:11). She was started on midodrine and found to be parainfluenza positive.    CICU COURSE:  Admitted to CICU on Levophed. Dopamine weaned off. EP consulted for possible device placement. Pending LHC & RHC with possible TVP study. Endo & ID following.    FOR FOLLOW UP:  [] LHC & RHC  [] f/u serological workup  [] f/u EP reccs  [] f/u Endo reccs  [] f/u ID reccs    Shu Sanchez PA-C

## 2024-04-20 NOTE — PROGRESS NOTE ADULT - CRITICAL CARE ATTENDING COMMENT
59 yo woman with hyperthyroidism, p/w fevers, symptomatic osiel     A+Ox3  bradycardia and hypotension requiring dopamine and phenyl use previously, will dc mitodrine at this time as she hemodynamically improved   TTE showed normal EF   O2 sats mid to high 90s on room air  DASH diet  Normal renal function  H/H normal  HSQ for DVT prophylaxis  febrile in the ED, + parainfluenza, for now further cardiac work up on hold given that   Sugars controlled  No central access

## 2024-04-20 NOTE — PROGRESS NOTE ADULT - SUBJECTIVE AND OBJECTIVE BOX
Patient is a 58y old  Female who presents with a chief complaint of Bradycardia (2024 06:57)      INTERVAL HISTORY:  -    SUBJECTIVE  - Patient seen and evaluated at bedside.     MEDICATIONS:  MEDICATIONS  (STANDING):  heparin   Injectable 5000 Unit(s) SubCutaneous every 8 hours  melatonin 1 milliGRAM(s) Oral at bedtime  polyethylene glycol 3350 17 Gram(s) Oral two times a day  senna 2 Tablet(s) Oral at bedtime    MEDICATIONS  (PRN):  bisacodyl 5 milliGRAM(s) Oral every 12 hours PRN Constipation      OBJECTIVE:  ICU Vital Signs Last 24 Hrs  T(C): 36.6 (2024 16:00), Max: 37 (2024 03:00)  T(F): 97.8 (2024 16:00), Max: 98.6 (2024 03:00)  HR: 56 (2024 19:00) (44 - 73)  BP: 104/51 (2024 19:00) (84/44 - 115/56)  BP(mean): 69 (2024 19:00) (58 - 80)  ABP: --  ABP(mean): --  RR: 21 (2024 19:00) (17 - 33)  SpO2: 96% (2024 19:00) (93% - 97%)    O2 Parameters below as of 2024 19:00  Patient On (Oxygen Delivery Method): room air            Adult Advanced Hemodynamics Last 24 Hrs  CVP(mm Hg): --  CVP(cm H2O): --  CO: --  CI: --  PA: --  PA(mean): --  PCWP: --  SVR: --  SVRI: --  PVR: --  PVRI: --  CAPILLARY BLOOD GLUCOSE        CAPILLARY BLOOD GLUCOSE        I&O's Summary    2024 07:01  -  2024 07:00  --------------------------------------------------------  IN: 1150 mL / OUT: 900 mL / NET: 250 mL    2024 07:01  -  2024 19:29  --------------------------------------------------------  IN: 960 mL / OUT: 1300 mL / NET: -340 mL      Daily     Daily Weight in k.7 (2024 05:00)    PHYSICAL EXAM:  General: NAD, well-groomed, well-developed  Eyes: Conjunctiva and sclera clear  ENMT: Moist mucous membranes  Neck: Supple  Chest: Clear to auscultation bilaterally; no rales, rhonchi, or wheezing  Heart: Regular rate and rhythm; normal S1 and S2; no murmurs, rubs, or gallops  Abd: Soft, nontender, nondistended  Nervous System: AAOX3  Ext: no peripheral LE edema bilaterally  Lines/Tubes: IV peripheral    LABS:                          12.5   5.85  )-----------( 211      ( 2024 00:56 )             38.6     04-20    138  |  102  |  18  ----------------------------<  87  4.4   |  24  |  0.63    Ca    9.4      2024 00:56  Phos  4.2     04-20  Mg     2.4     04-20    TPro  6.4  /  Alb  3.8  /  TBili  0.2  /  DBili  x   /  AST  19  /  ALT  12  /  AlkPhos  81  04-20    LIVER FUNCTIONS - ( 2024 00:56 )  Alb: 3.8 g/dL / Pro: 6.4 g/dL / ALK PHOS: 81 U/L / ALT: 12 U/L / AST: 19 U/L / GGT: x           PT/INR - ( 2024 00:56 )   PT: 9.8 sec;   INR: 0.89 ratio         PTT - ( 2024 00:56 )  PTT:29.9 sec        Urinalysis Basic - ( 2024 00:56 )    Color: x / Appearance: x / SG: x / pH: x  Gluc: 87 mg/dL / Ketone: x  / Bili: x / Urobili: x   Blood: x / Protein: x / Nitrite: x   Leuk Esterase: x / RBC: x / WBC x   Sq Epi: x / Non Sq Epi: x / Bacteria: x          Plan:  ====================== NEUROLOGY=====================  - continue to monitor mental status as per protocol     ==================== RESPIRATORY======================  - continue to monitor SpO2 with goal >94%    Mechanical Vent:     ====================CARDIOVASCULAR==================      ===================== RENAL =========================  - Continue monitoring urine output, lytes, SCr/ BUN  - replete lytes prn with goal K >4 and Mg >2    =============== GASTROINTESTINAL===================      ===================ENDO====================      ===================HEMATOLOGIC/ONC ===================  - Monitor H/H and plts  - DVT PPX:     ==================INFECTIOUS DISEASE================  - monitor and trend WBC and temperature curve     Dispo:    Patient requires continuous monitoring with bedside rhythm monitoring, arterial line, pulse oximetry, ventilator monitoring and intermittent blood gas analysis.  Care plan discussed with ICU care team.  I have spent 35 minutes providing critical care, in addition to initial critical time provided by CICU attending Dr. Hastings, re-evaluated multiple times during the day.    Shu Sanchez PA-C Patient is a 58y old  Female who presents with a chief complaint of Bradycardia (2024 06:57).    INTERVAL HISTORY:  -     SUBJECTIVE  - Patient seen and evaluated at bedside.     MEDICATIONS:  MEDICATIONS  (STANDING):  heparin   Injectable 5000 Unit(s) SubCutaneous every 8 hours  melatonin 1 milliGRAM(s) Oral at bedtime  polyethylene glycol 3350 17 Gram(s) Oral two times a day  senna 2 Tablet(s) Oral at bedtime    MEDICATIONS  (PRN):  bisacodyl 5 milliGRAM(s) Oral every 12 hours PRN Constipation    OBJECTIVE:  ICU Vital Signs Last 24 Hrs  T(C): 36.6 (2024 16:00), Max: 37 (2024 03:00)  T(F): 97.8 (2024 16:00), Max: 98.6 (2024 03:00)  HR: 56 (2024 19:00) (44 - 73)  BP: 104/51 (2024 19:00) (84/44 - 115/56)  BP(mean): 69 (2024 19:00) (58 - 80)  RR: 21 (2024 19:00) (17 - 33)  SpO2: 96% (2024 19:00) (93% - 97%)    O2 Parameters below as of 2024 19:00  Patient On (Oxygen Delivery Method): room air    I&O's Summary    2024 07:01  -  2024 07:00  --------------------------------------------------------  IN: 1150 mL / OUT: 900 mL / NET: 250 mL    2024 07:01  -  2024 19:29  --------------------------------------------------------  IN: 960 mL / OUT: 1300 mL / NET: -340 mL    Daily Weight in k.7 (2024 05:00)    PHYSICAL EXAM:  General: NAD, well-groomed, well-developed  Eyes: Conjunctiva and sclera clear  ENMT: Moist mucous membranes  Neck: Supple  Chest: Clear to auscultation bilaterally; no rales, rhonchi, or wheezing  Heart: Regular rate and rhythm; normal S1 and S2; no murmurs, rubs, or gallops  Abd: Soft, nontender, nondistended  Nervous System: AAOX3  Ext: no peripheral LE edema bilaterally  Lines/Tubes: IV peripheral    LABS:                        12.5   5.85  )-----------( 211      ( 2024 00:56 )             38.6     04-20    138  |  102  |  18  ----------------------------<  87  4.4   |  24  |  0.63    Ca    9.4      2024 00:56  Phos  4.2     04-20  Mg     2.4     04-20    TPro  6.4  /  Alb  3.8  /  TBili  0.2  /  DBili  x   /  AST  19  /  ALT  12  /  AlkPhos  81  04-20    LIVER FUNCTIONS - ( 2024 00:56 )  Alb: 3.8 g/dL / Pro: 6.4 g/dL / ALK PHOS: 81 U/L / ALT: 12 U/L / AST: 19 U/L / GGT: x           PT/INR - ( 2024 00:56 )   PT: 9.8 sec;   INR: 0.89 ratio         PTT - ( 2024 00:56 )  PTT:29.9 sec        Urinalysis Basic - ( 2024 00:56 )    Color: x / Appearance: x / SG: x / pH: x  Gluc: 87 mg/dL / Ketone: x  / Bili: x / Urobili: x   Blood: x / Protein: x / Nitrite: x   Leuk Esterase: x / RBC: x / WBC x   Sq Epi: x / Non Sq Epi: x / Bacteria: x          Plan:  ====================== NEUROLOGY=====================  - continue to monitor mental status as per protocol     ==================== RESPIRATORY======================  - continue to monitor SpO2 with goal >94%    Mechanical Vent:     ====================CARDIOVASCULAR==================      ===================== RENAL =========================  - Continue monitoring urine output, lytes, SCr/ BUN  - replete lytes prn with goal K >4 and Mg >2    =============== GASTROINTESTINAL===================      ===================ENDO====================      ===================HEMATOLOGIC/ONC ===================  - Monitor H/H and plts  - DVT PPX:     ==================INFECTIOUS DISEASE================  - monitor and trend WBC and temperature curve     Dispo:    Patient requires continuous monitoring with bedside rhythm monitoring, arterial line, pulse oximetry, ventilator monitoring and intermittent blood gas analysis.  Care plan discussed with ICU care team.  I have spent 35 minutes providing critical care, in addition to initial critical time provided by CICU attending Dr. Hastings, re-evaluated multiple times during the day.    Shu Sanchez PA-C Patient is a 58y old  Female who presents with a chief complaint of Bradycardia (2024 06:57).    INTERVAL HISTORY:  - no acute events    SUBJECTIVE  - Patient seen and evaluated at bedside.     MEDICATIONS:  MEDICATIONS  (STANDING):  heparin   Injectable 5000 Unit(s) SubCutaneous every 8 hours  melatonin 1 milliGRAM(s) Oral at bedtime  polyethylene glycol 3350 17 Gram(s) Oral two times a day  senna 2 Tablet(s) Oral at bedtime    MEDICATIONS  (PRN):  bisacodyl 5 milliGRAM(s) Oral every 12 hours PRN Constipation    OBJECTIVE:  ICU Vital Signs Last 24 Hrs  T(C): 36.6 (2024 16:00), Max: 37 (2024 03:00)  T(F): 97.8 (2024 16:00), Max: 98.6 (2024 03:00)  HR: 56 (2024 19:00) (44 - 73)  BP: 104/51 (2024 19:00) (84/44 - 115/56)  BP(mean): 69 (2024 19:00) (58 - 80)  RR: 21 (2024 19:00) (17 - 33)  SpO2: 96% (2024 19:00) (93% - 97%)    O2 Parameters below as of 2024 19:00  Patient On (Oxygen Delivery Method): room air    I&O's Summary    2024 07:01  -  2024 07:00  --------------------------------------------------------  IN: 1150 mL / OUT: 900 mL / NET: 250 mL    2024 07:01  -  2024 19:29  --------------------------------------------------------  IN: 960 mL / OUT: 1300 mL / NET: -340 mL    Daily Weight in k.7 (2024 05:00)    PHYSICAL EXAM:  General: NAD, well-groomed, well-developed  Eyes: Conjunctiva and sclera clear  ENMT: Moist mucous membranes  Neck: Supple  Chest: Clear to auscultation bilaterally; no rales, rhonchi, or wheezing  Heart: Regular rate and rhythm; normal S1 and S2; no murmurs, rubs, or gallops  Abd: Soft, nontender, nondistended  Nervous System: AAOX3  Ext: no peripheral LE edema bilaterally    LABS:                  12.5   5.85  )-----------( 211      ( 2024 00:56 )             38.6     04-20    138  |  102  |  18  ----------------------------<  87  4.4   |  24  |  0.63    Ca    9.4      2024 00:56  Phos  4.2     04-20  Mg     2.4     04-20    TPro  6.4  /  Alb  3.8  /  TBili  0.2  /  DBili  x   /  AST  19  /  ALT  12  /  AlkPhos  81  04-20  LIVER FUNCTIONS - ( 2024 00:56 )  Alb: 3.8 g/dL / Pro: 6.4 g/dL / ALK PHOS: 81 U/L / ALT: 12 U/L / AST: 19 U/L / GGT: x         PT/INR - ( 2024 00:56 )   PT: 9.8 sec;   INR: 0.89 ratio    PTT - ( 2024 00:56 )  PTT:29.9 sec    Urinalysis Basic - ( 2024 00:56 )    Color: x / Appearance: x / SG: x / pH: x  Gluc: 87 mg/dL / Ketone: x  / Bili: x / Urobili: x   Blood: x / Protein: x / Nitrite: x   Leuk Esterase: x / RBC: x / WBC x   Sq Epi: x / Non Sq Epi: x / Bacteria: x      Assessment: 58F with PMHx hyperthyroidism on methimazole presenting with right-sided chest pain, cough, headache since Friday found to have parainfluenxa also w bigeminy and bradycardia.    Plan:  ====================== NEUROLOGY=====================  Aox3  - continue to monitor mental status as per protocol     ==================== RESPIRATORY======================  Parainfluenza PNA  - Not producing sputum currently, hx of fevers, night sweats, episode of hemoptysis   - continue to monitor SpO2 with goal >94%    ====================CARDIOVASCULAR==================  Sinus bradycardia with frequent PVCs, hypotension   - currently in bigeminy HR in 60s, BP stable  - TFTs wnl   - Echo EF 63%, septal abnormality, RVF wnl  - Extensive serology ordered to figure out etiology including HIV (-), RF -, Lyme -, Leptospirosis, ferritin wnl, LONG pend.   - Now off Midrodine continue to monitor BP  - EP following    ===================== RENAL =========================  No active issues  - Continue monitoring urine output, lytes, SCr/ BUN  - replete lytes prn with goal K >4 and Mg >2    =============== GASTROINTESTINAL===================  No active issues  Tolerating PO diet    ===================ENDO====================  Hyperthyroidism  - on Methimazole at home  - TSH, T4, T3 wnl   - Thyroid U/S with L thyroid lobe 1.9cm nodule, no sonographic suspicion per report but will need surveillance  - Endocrine recs appreciated    ===================HEMATOLOGIC/ONC ===================  H/H & plts stable  - Monitor H/H and plts  - DVT PPX: HSQ    ==================INFECTIOUS DISEASE================  Parainfluenza  - s/p Cefepime course (-)  - ID consulted for further recs  - Found to be parainfluenza positive on RPP  - S/p CTH, CT maxillofacial, and CTAP which only demonstrated inflammation in the paranasal sinuses  - monitor and trend WBC and temperature curve     Dispo: Hemodynamically stable for downgrade at this time.    Patient requires continuous monitoring with bedside rhythm monitoring, arterial line, pulse oximetry, ventilator monitoring and intermittent blood gas analysis.  Care plan discussed with ICU care team.  I have spent 35 minutes providing critical care, in addition to initial critical time provided by CICU attending Dr. Hastings, re-evaluated multiple times during the day.    Shu Sanchez PA-C

## 2024-04-21 DIAGNOSIS — Z29.9 ENCOUNTER FOR PROPHYLACTIC MEASURES, UNSPECIFIED: ICD-10-CM

## 2024-04-21 DIAGNOSIS — B34.8 OTHER VIRAL INFECTIONS OF UNSPECIFIED SITE: ICD-10-CM

## 2024-04-21 DIAGNOSIS — R00.1 BRADYCARDIA, UNSPECIFIED: ICD-10-CM

## 2024-04-21 LAB
ALBUMIN SERPL ELPH-MCNC: 3.8 G/DL — SIGNIFICANT CHANGE UP (ref 3.3–5)
ALP SERPL-CCNC: 84 U/L — SIGNIFICANT CHANGE UP (ref 40–120)
ALT FLD-CCNC: 31 U/L — SIGNIFICANT CHANGE UP (ref 10–45)
ANION GAP SERPL CALC-SCNC: 12 MMOL/L — SIGNIFICANT CHANGE UP (ref 5–17)
APTT BLD: 48.7 SEC — HIGH (ref 24.5–35.6)
AST SERPL-CCNC: 39 U/L — SIGNIFICANT CHANGE UP (ref 10–40)
BASOPHILS # BLD AUTO: 0.04 K/UL — SIGNIFICANT CHANGE UP (ref 0–0.2)
BASOPHILS NFR BLD AUTO: 0.8 % — SIGNIFICANT CHANGE UP (ref 0–2)
BILIRUB SERPL-MCNC: 0.2 MG/DL — SIGNIFICANT CHANGE UP (ref 0.2–1.2)
BLD GP AB SCN SERPL QL: NEGATIVE — SIGNIFICANT CHANGE UP
BUN SERPL-MCNC: 17 MG/DL — SIGNIFICANT CHANGE UP (ref 7–23)
CALCIUM SERPL-MCNC: 9.6 MG/DL — SIGNIFICANT CHANGE UP (ref 8.4–10.5)
CHLORIDE SERPL-SCNC: 103 MMOL/L — SIGNIFICANT CHANGE UP (ref 96–108)
CO2 SERPL-SCNC: 23 MMOL/L — SIGNIFICANT CHANGE UP (ref 22–31)
CREAT SERPL-MCNC: 0.63 MG/DL — SIGNIFICANT CHANGE UP (ref 0.5–1.3)
CREATININE, URINE RESULT: 63 MG/DL — SIGNIFICANT CHANGE UP
CULTURE RESULTS: SIGNIFICANT CHANGE UP
CULTURE RESULTS: SIGNIFICANT CHANGE UP
DIGITOXIN SERPL-MCNC: <5 NG/ML — LOW (ref 10–25)
EGFR: 101 ML/MIN/1.73M2 — SIGNIFICANT CHANGE UP
EOSINOPHIL # BLD AUTO: 0.11 K/UL — SIGNIFICANT CHANGE UP (ref 0–0.5)
EOSINOPHIL NFR BLD AUTO: 2.1 % — SIGNIFICANT CHANGE UP (ref 0–6)
GLUCOSE SERPL-MCNC: 108 MG/DL — HIGH (ref 70–99)
HCT VFR BLD CALC: 36.9 % — SIGNIFICANT CHANGE UP (ref 34.5–45)
HGB BLD-MCNC: 12.4 G/DL — SIGNIFICANT CHANGE UP (ref 11.5–15.5)
IMM GRANULOCYTES NFR BLD AUTO: 1.3 % — HIGH (ref 0–0.9)
INR BLD: 0.91 RATIO — SIGNIFICANT CHANGE UP (ref 0.85–1.18)
LYMPHOCYTES # BLD AUTO: 3.35 K/UL — HIGH (ref 1–3.3)
LYMPHOCYTES # BLD AUTO: 64.5 % — HIGH (ref 13–44)
MAGNESIUM SERPL-MCNC: 2.4 MG/DL — SIGNIFICANT CHANGE UP (ref 1.6–2.6)
MCHC RBC-ENTMCNC: 31.5 PG — SIGNIFICANT CHANGE UP (ref 27–34)
MCHC RBC-ENTMCNC: 33.6 GM/DL — SIGNIFICANT CHANGE UP (ref 32–36)
MCV RBC AUTO: 93.7 FL — SIGNIFICANT CHANGE UP (ref 80–100)
MONOCYTES # BLD AUTO: 0.32 K/UL — SIGNIFICANT CHANGE UP (ref 0–0.9)
MONOCYTES NFR BLD AUTO: 6.2 % — SIGNIFICANT CHANGE UP (ref 2–14)
NEUTROPHILS # BLD AUTO: 1.3 K/UL — LOW (ref 1.8–7.4)
NEUTROPHILS NFR BLD AUTO: 25.1 % — LOW (ref 43–77)
NRBC # BLD: 0 /100 WBCS — SIGNIFICANT CHANGE UP (ref 0–0)
PHOSPHATE SERPL-MCNC: 4 MG/DL — SIGNIFICANT CHANGE UP (ref 2.5–4.5)
PLATELET # BLD AUTO: 241 K/UL — SIGNIFICANT CHANGE UP (ref 150–400)
POTASSIUM SERPL-MCNC: 4.4 MMOL/L — SIGNIFICANT CHANGE UP (ref 3.5–5.3)
POTASSIUM SERPL-SCNC: 4.4 MMOL/L — SIGNIFICANT CHANGE UP (ref 3.5–5.3)
PROT ?TM UR-MCNC: 6 MG/DL — SIGNIFICANT CHANGE UP (ref 0–12)
PROT SERPL-MCNC: 5.9 G/DL — LOW (ref 6–8.3)
PROT SERPL-MCNC: 6.4 G/DL — SIGNIFICANT CHANGE UP (ref 6–8.3)
PROTHROM AB SERPL-ACNC: 9.6 SEC — SIGNIFICANT CHANGE UP (ref 9.5–13)
RBC # BLD: 3.94 M/UL — SIGNIFICANT CHANGE UP (ref 3.8–5.2)
RBC # FLD: 12.6 % — SIGNIFICANT CHANGE UP (ref 10.3–14.5)
RH IG SCN BLD-IMP: POSITIVE — SIGNIFICANT CHANGE UP
SODIUM SERPL-SCNC: 138 MMOL/L — SIGNIFICANT CHANGE UP (ref 135–145)
SPECIMEN SOURCE: SIGNIFICANT CHANGE UP
SPECIMEN SOURCE: SIGNIFICANT CHANGE UP
WBC # BLD: 5.19 K/UL — SIGNIFICANT CHANGE UP (ref 3.8–10.5)
WBC # FLD AUTO: 5.19 K/UL — SIGNIFICANT CHANGE UP (ref 3.8–10.5)

## 2024-04-21 PROCEDURE — 99232 SBSQ HOSP IP/OBS MODERATE 35: CPT

## 2024-04-21 RX ADMIN — HEPARIN SODIUM 5000 UNIT(S): 5000 INJECTION INTRAVENOUS; SUBCUTANEOUS at 05:11

## 2024-04-21 RX ADMIN — Medication 1 MILLIGRAM(S): at 21:20

## 2024-04-21 RX ADMIN — POLYETHYLENE GLYCOL 3350 17 GRAM(S): 17 POWDER, FOR SOLUTION ORAL at 18:30

## 2024-04-21 RX ADMIN — POLYETHYLENE GLYCOL 3350 17 GRAM(S): 17 POWDER, FOR SOLUTION ORAL at 05:11

## 2024-04-21 RX ADMIN — SENNA PLUS 2 TABLET(S): 8.6 TABLET ORAL at 21:20

## 2024-04-21 RX ADMIN — HEPARIN SODIUM 5000 UNIT(S): 5000 INJECTION INTRAVENOUS; SUBCUTANEOUS at 14:06

## 2024-04-21 RX ADMIN — HEPARIN SODIUM 5000 UNIT(S): 5000 INJECTION INTRAVENOUS; SUBCUTANEOUS at 21:20

## 2024-04-21 NOTE — PROGRESS NOTE ADULT - ASSESSMENT
57 y/o F with PMH of hyperthyroidism on methimazole presenting with right-sided chest pain, cough, headache on 4/16. Also had chills and sweats. Patient had 1 episode of hemoptysis 2 days before presentation and associated left-sided headache. Patients coworker had COVID the week prior to presentation. EKG in the ED revealed sinus bradycardia with frequent PVCs. Febrile to 101.2 on admission. RVP and UA negative, CTA negative for PE. Also reports taking Methimazole at home without consistent appts or TSH being checked with question of methimazole toxicity. Hypotensive to 80/40 on admission despite 2L of IVF so started on Levophed. EP consulted and said no need for TVP at this time. Admitted to CICU for further workup. She was started on midodrine and found to be parainfluenza positive.

## 2024-04-21 NOTE — PROGRESS NOTE ADULT - PROBLEM/PLAN-2
HPI Comments: Kindra Duffy is a 42 y.o. male with PMHx of HTN, HCL, diverticulitis, bowel resection, 2 bleeding ulcers, 2 renal transplants, and CAD presenting ambulatory to ED [facility] c/o sudden onset of left lower quadrant abdominal pain since 0200 today. Pt rates his abdominal pain as 6/10. He reports additional symptoms of nausea and vomiting x2. Pt notes that he has not been able to tolerate anything PO. Per pt, he has had an appendectomy and cholecystectomy. He reports that he is transitioning to home dialysis which he does Monday through Friday for 2 and a half hours a day, and he does not have any issues with dialysis. His dialysis is in his right arm. His last bowel movement was 2 days ago. Pt denies sick contacts, diarrhea, chest pain, SOB, fever, nasal congestion, sore throat, ear pain, eating raw foods, or FMHx of intestinal issues. PCP: Kyle Walden MD    There are no other complaints, changes, or physical findings at this time. The history is provided by the patient. No  was used.         Past Medical History:   Diagnosis Date    Abdominal hematoma     AICD (automatic cardioverter/defibrillator) present     CAD (coronary artery disease)     anterior MI s/p 2 stents  2007    Chronic abdominal pain     Chronic kidney disease     DVT (deep venous thrombosis) ([address] 2001    DVT of popliteal vein ([address] 11/2011    not anticoagulated due to bleeding, IVC filter    Gallstone pancreatitis     Gastrointestinal disorder     acid reflux    Gastrointestinal disorder     peptic ulcer    High cholesterol     Hypertension     Kidney transplant     b/l kidneys 1995, 2001    Nephrotic syndrome     Other ill-defined conditions(799.89)     kidny transplant x2,  on dialysis    Other ill-defined conditions(799.89)     dialysis tu-th-sat    Other ill-defined conditions(799.89)     hx recurrent left leg DVT    Peritonitis (HCC)     Seizures ([address]     [facility] bowel obstruction [facility]     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[facility]     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    V-tach [facility])        Past Surgical History:   Procedure Laterality Date    HX APPENDECTOMY      HX CHOLECYSTECTOMY      HX OTHER SURGICAL      cholecystectomy    HX OTHER SURGICAL  11/2011    exploratory laparotomy    HX OTHER SURGICAL      fem-pop    HX OTHER SURGICAL  02/2013    right upper extremity fistula    HX PACEMAKER  6/2009    AICD    HX SMALL BOWEL RESECTION      HX TRANSPLANT  2001     Kidney    HX UROLOGICAL      2 kidney transplants    AL EDG US EXAM SURGICAL ALTER STOM DUODENUM/JEJUNUM  7/15/2011         AL ESOPHAGOGASTRODUODENOSCOPY TRANSORAL DIAGNOSTIC  5/24/2012              Family History:   Problem Relation Age of Onset    COPD Mother     Lung Disease Mother     Cancer Father      stomach    Cancer Maternal Grandmother        Social History     Social History    Marital status: SINGLE     Spouse name: N/A    Number of children: N/A    Years of education: N/A     Occupational History    Not on file. Social History Main Topics    Smoking status: Never Smoker    Smokeless tobacco: Never Used    Alcohol use No    Drug use: No    Sexual activity: Not on file     Other Topics Concern    Not on file     Social History Narrative         ALLERGIES: Shellfish containing products; Heparin analogues; and Iodinated contrast media - oral and iv dye    Review of Systems   Constitutional: Negative. Negative for chills and fever. HENT: Negative. Negative for congestion, ear pain and sore throat. Eyes: Negative. Respiratory: Negative. Negative for shortness of breath. Cardiovascular: Negative. Negative for chest pain. Gastrointestinal: Positive for abdominal pain, nausea and vomiting. Negative for diarrhea. Genitourinary: Negative. Negative for dysuria. Musculoskeletal: Negative. Negative for back pain. Skin: Negative. Neurological: Negative. Negative for dizziness.    All other systems reviewed and are negative. Patient Vitals for the past 12 hrs:   Temp Pulse Resp BP SpO2   02/25/17 1130 - - - (!) 129/100 100 %   02/25/17 1115 - - - (!) 131/98 100 %   02/25/17 1100 - - - (!) 125/91 100 %   02/25/17 1045 - - - 118/86 99 %   02/25/17 1030 - - - 122/89 98 %   02/25/17 1015 - - - 119/89 99 %   02/25/17 1000 - - - 122/89 99 %   02/25/17 0840 98.2 °F (36.8 °C) 88 16 - 99 %            Physical Exam   Vital signs and nursing notes reviewed    CONSTITUTIONAL: Alert, in mild distress; well-developed; well-nourished. HEAD:  Normocephalic, atraumatic  EYES: PERRL; EOM's intact. ENTM: Nose: no rhinorrhea; Throat: no erythema or exudate, mucous membranes moist  Neck:  Supple. trachea is midline. RESP: Chest clear, equal breath sounds. - W/R/R  CV: S1 and S2 WNL; No murmurs, gallops or rubs. 2+ radial and DP pulses bilaterally. GI: non-distended, normal bowel sounds, abdomen soft. No masses or organomegaly. Multiple well healed scars. Tenderness of the RLQ, no rebounding or guarding. : No costo-vertebral angle tenderness. BACK:  Non-tender, normal appearance  UPPER EXT:  Normal inspection. no joint or soft tissue swelling. HD access in right upper arm  LOWER EXT: No edema, no calf tenderness. NEURO: Alert and oriented x3, 5/5 strength and light touch sensation intact in bilateral upper and lower extremities. SKIN: No rashes; Warm and dry  PSYCH: Normal mood, normal affect    MDM  Number of Diagnoses or Management Options  Constipation, unspecified constipation type:   Non-intractable vomiting with nausea, unspecified vomiting type:   Diagnosis management comments: 42 y.o. Male end stage renal disease. Pt with nausea and vomiting likely viral, imaging performed due to LLQ pain showing constipation only, but no obstruction or infection. Pt tolerating PO here. Plans home dialysis. Slightly diminished potassium levels, but given pt is a dialysis pt will not robustly replete here.        Amount and/or Complexity of Data Reviewed  Clinical lab tests: ordered and reviewed  Tests in the radiology section of CPT®: ordered and reviewed  Review and summarize past medical records: yes    Patient Progress  Patient progress: stable    ED Course       Procedures    LABORATORY TESTS:  Recent Results (from the past 12 hour(s))   CBC WITH AUTOMATED DIFF    Collection Time: 02/25/17  9:20 AM   Result Value Ref Range    WBC 7.1 4.1 - 11.1 K/uL    RBC 3.87 (L) 4.10 - 5.70 M/uL    HGB 12.2 12.1 - 17.0 g/dL    HCT 35.8 (L) 36.6 - 50.3 %    MCV 92.5 80.0 - 99.0 FL    MCH 31.5 26.0 - 34.0 PG    MCHC 34.1 30.0 - 36.5 g/dL    RDW 16.3 (H) 11.5 - 14.5 %    PLATELET 047 (L) 515 - 400 K/uL    NEUTROPHILS 62 32 - 75 %    LYMPHOCYTES 15 12 - 49 %    MONOCYTES 7 5 - 13 %    EOSINOPHILS 14 (H) 0 - 7 %    BASOPHILS 2 (H) 0 - 1 %    ABS. NEUTROPHILS 4.4 1.8 - 8.0 K/UL    ABS. LYMPHOCYTES 1.1 0.8 - 3.5 K/UL    ABS. MONOCYTES 0.5 0.0 - 1.0 K/UL    ABS. EOSINOPHILS 1.0 (H) 0.0 - 0.4 K/UL    ABS.  BASOPHILS 0.1 0.0 - 0.1 K/UL    RBC COMMENTS NORMOCYTIC, NORMOCHROMIC     MAGNESIUM    Collection Time: 02/25/17  9:20 AM   Result Value Ref Range    Magnesium 2.1 1.6 - 2.4 mg/dL   PHOSPHORUS    Collection Time: 02/25/17  9:20 AM   Result Value Ref Range    Phosphorus 3.3 2.6 - 4.7 MG/DL   LIPASE    Collection Time: 02/25/17  9:20 AM   Result Value Ref Range    Lipase 102 73 - 393 U/L   LACTIC ACID, PLASMA    Collection Time: 02/25/17  9:20 AM   Result Value Ref Range    Lactic acid 1.0 0.4 - 2.0 MMOL/L   METABOLIC PANEL, COMPREHENSIVE    Collection Time: 02/25/17  9:20 AM   Result Value Ref Range    Sodium 138 136 - 145 mmol/L    Potassium 3.1 (L) 3.5 - 5.1 mmol/L    Chloride 100 97 - 108 mmol/L    CO2 24 21 - 32 mmol/L    Anion gap 14 5 - 15 mmol/L    Glucose 74 65 - 100 mg/dL    BUN 20 6 - 20 MG/DL    Creatinine 7.34 (H) 0.70 - 1.30 MG/DL    BUN/Creatinine ratio 3 (L) 12 - 20      GFR est AA 10 (L) >60 ml/min/1.73m2    GFR est non-AA 8 (L) >60 ml/min/1.73m2    Calcium 8.6 8.5 - 10.1 MG/DL    Bilirubin, total 0.7 0.2 - 1.0 MG/DL    ALT (SGPT) 10 (L) 12 - 78 U/L    AST (SGOT) 18 15 - 37 U/L    Alk. phosphatase 133 (H) 45 - 117 U/L    Protein, total 7.7 6.4 - 8.2 g/dL    Albumin 3.5 3.5 - 5.0 g/dL    Globulin 4.2 (H) 2.0 - 4.0 g/dL    A-G Ratio 0.8 (L) 1.1 - 2.2         IMAGING RESULTS:    INDICATION: acute LLQ pain; eval for diverticulitis with history of bilateral  renal transplants, peritonitis     COMPARISON: CT 1/29/2017     TECHNIQUE:   Thin axial images were obtained through the abdomen and pelvis. Coronal and  sagittal reconstructions were generated. Oral contrast was not administered. CT  dose reduction was achieved through use of a standardized protocol tailored for  this examination and automatic exposure control for dose modulation.      The absence of intravenous contrast material reduces the sensitivity for  evaluation of the solid parenchymal organs of the abdomen.      FINDINGS:   LUNG BASES: Mild bibasilar atelectasis and small right pleural effusion, mildly  worse  INCIDENTALLY IMAGED HEART AND MEDIASTINUM: Chronic cardiomegaly. Pacer leads are  noted  LIVER: No mass or biliary dilatation. GALLBLADDER: Status post cholecystectomy  SPLEEN: Peripheral splenic calcifications are stable  PANCREAS: No mass or ductal dilatation. ADRENALS: Unremarkable. KIDNEYS/URETERS: Negative kidneys are atrophic, and there is a chronic left  lower pole low-density lesion, likely a cyst, 2.6 cm  STOMACH: Unremarkable. SMALL BOWEL: No dilatation or wall thickening. COLON: Evidence of moderate constipation. No evidence of acute diverticulitis  APPENDIX: Not visualized  PERITONEUM: No significant ascites. Partially calcified mesenteric mass is again  noted, 3 x 2.3 cm, not significantly changed. Additional low-density lesion  along the left liver border, and along the posterior bare area of the liver as  well, not significantly changed.  No new mesenteric lesions are identified. RETROPERITONEUM: No lymphadenopathy or aortic aneurysm. Chronic calcified soft  tissue lesions anterior to bilateral psoas muscles, not significantly changed. REPRODUCTIVE ORGANS: Prostate is noted. There is presacral edema which is  chronic. URINARY BLADDER: No mass or calculus. BONES: Evidence of renal osteodystrophy throughout the spine. No acute osseous  abnormality  ADDITIONAL COMMENTS: N/A     IMPRESSION  IMPRESSION:  Evidence of moderate constipation. No evidence of acute diverticulitis. Small  right pleural effusion has mildly worsened. No other significant change. MEDICATIONS GIVEN:  Medications   morphine injection 4 mg (4 mg IntraVENous Incomplete 2/25/17 1059)   diatrizoate meglumine-d.sodium (MD-GASTROVIEW,GASTROGRAFIN) 66-10 % contrast solution 30 mL (30 mL Oral Given 2/25/17 1021)   ondansetron (ZOFRAN) injection 4 mg (4 mg IntraVENous Given 2/25/17 1021)       IMPRESSION:  1. Non-intractable vomiting with nausea, unspecified vomiting type    2. Constipation, unspecified constipation type        PLAN:  1. Discharge Medication List as of 2/25/2017  1:23 PM      START taking these medications    Details   senna-docusate (BENY-COLACE) 8.6-50 mg per tablet Take 2 Tabs by mouth daily for 20 doses. , Normal, Disp-40 Tab, R-0      !! ondansetron (ZOFRAN ODT) 4 mg disintegrating tablet Take 1 Tab by mouth every eight (8) hours as needed for Nausea., Normal, Disp-10 Tab, R-0       !! - Potential duplicate medications found. Please discuss with provider. CONTINUE these medications which have NOT CHANGED    Details   !! warfarin (COUMADIN) 2.5 mg tablet Take 2.5 mg by mouth every Sunday., Historical Med      HYDROcodone-acetaminophen (NORCO) 5-325 mg per tablet Take 1 Tab by mouth every four (4) hours as needed for Pain.  Max Daily Amount: 6 Tabs., Print, Disp-20 Tab, R-0      !! ondansetron (ZOFRAN ODT) 4 mg disintegrating tablet Take 1 Tab by mouth every eight (8) hours as needed for Nausea. , Print, Disp-10 Tab, R-0      promethazine (PHENERGAN) 25 mg tablet Take 1 Tab by mouth every six (6) hours as needed for Nausea. , Print, Disp-15 Tab, R-0      levETIRAcetam (KEPPRA) 750 mg tablet Take 750 mg by mouth two (2) times a day., Historical Med      lacosamide (VIMPAT) 50 mg tab tablet Take 50 mg by mouth two (2) times a day., Historical Med      atorvastatin (LIPITOR) 20 mg tablet Take 20 mg by mouth daily. , Historical Med      !! warfarin (COUMADIN) 5 mg tablet Decrease coumadin dose to 2.5mg while taking these antibiotics. Recheck INR every 2 days until you finish the antibiotics. , Print, Disp-30 Tab, R-0      pantoprazole (PROTONIX) 40 mg tablet Take 40 mg by mouth daily. , Historical Med      clopidogrel (PLAVIX) 75 mg tablet Take  by mouth daily. , Historical Med      metoprolol (LOPRESSOR) 25 mg tablet Take 12.5 mg by mouth daily. , Historical Med      sevelamer carbonate (RENVELA) 800 mg Tab tab Take 2,400 mg by mouth three (3) times daily (with meals). , Historical Med      aspirin 81 mg chewable tablet Take 81 mg by mouth daily. , Historical Med       !! - Potential duplicate medications found. Please discuss with provider. 2.   Follow-up Information     Follow up With Details Comments Contact Info    Dedra Arizmendi MD In 3 days for re-evaluation of your constipation         Return to ED if worse     DISCHARGE NOTE:  1:27 PM  The patient is ready for discharge. The patient's signs, symptoms, diagnosis, and discharge instructions have been discussed and the patient has conveyed their understanding. The patient is to follow up as recommended or return to the ER should their symptoms worsen. Plan has been discussed and the patient is in agreement. This note is prepared by Kavya Mckeon, acting as Scribe for MD Fred Pulliam MD: The scribe's documentation has been prepared under my direction and personally reviewed by me in its entirety.  I DISPLAY PLAN FREE TEXT confirm that the note above accurately reflects all work, treatment, procedures, and medical decision making performed by me.

## 2024-04-21 NOTE — PROGRESS NOTE ADULT - SUBJECTIVE AND OBJECTIVE BOX
[facility] Division of Hospital Medicine  Sweetie Padilla MD  Available via MS Teams      SUBJECTIVE / OVERNIGHT EVENTS: No acute events overnight. Pt has some coughing. No chest pain, palpitations dizziness, headache.    ADDITIONAL REVIEW OF SYSTEMS:    MEDICATIONS  (STANDING):  heparin   Injectable 5000 Unit(s) SubCutaneous every 8 hours  melatonin 1 milliGRAM(s) Oral at bedtime  polyethylene glycol 3350 17 Gram(s) Oral two times a day  senna 2 Tablet(s) Oral at bedtime    MEDICATIONS  (PRN):  bisacodyl 5 milliGRAM(s) Oral every 12 hours PRN Constipation      I&O's Summary    20 Apr 2024 07:01  -  21 Apr 2024 07:00  --------------------------------------------------------  IN: 1060 mL / OUT: 1900 mL / NET: -840 mL    21 Apr 2024 07:01  -  21 Apr 2024 13:55  --------------------------------------------------------  IN: 480 mL / OUT: 0 mL / NET: 480 mL        PHYSICAL EXAM:  Vital Signs Last 24 Hrs  T(C): 36.8 (21 Apr 2024 11:22), Max: 36.8 (21 Apr 2024 11:22)  T(F): 98.2 (21 Apr 2024 11:22), Max: 98.2 (21 Apr 2024 11:22)  HR: 52 (21 Apr 2024 11:22) (47 - 63)  BP: 96/58 (21 Apr 2024 11:22) (84/44 - 107/53)  BP(mean): 70 (21 Apr 2024 02:00) (61 - 76)  RR: 18 (21 Apr 2024 11:22) (13 - 26)  SpO2: 97% (21 Apr 2024 11:22) (95% - 98%)    Parameters below as of 21 Apr 2024 11:22  Patient On (Oxygen Delivery Method): room air      CONSTITUTIONAL: NAD, well-developed, well-groomed  EYES:  conjunctiva and sclera clear  ENMT: Moist oral mucosa  NECK: Supple  RESPIRATORY: Normal respiratory effort; lungs are clear to auscultation bilaterally  CARDIOVASCULAR: Regular rate and rhythm, normal S1 and S2,  No lower extremity edema  ABDOMEN: Nontender to palpation, normoactive bowel sounds  MUSCULOSKELETAL:  no joint swelling or tenderness to palpation  PSYCH: A+O to person, place, and time  NEUROLOGY: no gross sensory deficits   SKIN: No rashes    LABS:                        12.4   5.19  )-----------( 241      ( 21 Apr 2024 00:35 )             36.9     04-21    138  |  103  |  17  ----------------------------<  108<H>  4.4   |  23  |  0.63    Ca    9.6      21 Apr 2024 00:34  Phos  4.0     04-21  Mg     2.4     04-21    TPro  6.4  /  Alb  3.8  /  TBili  0.2  /  DBili  x   /  AST  39  /  ALT  31  /  AlkPhos  84  04-21    PT/INR - ( 21 Apr 2024 00:34 )   PT: 9.6 sec;   INR: 0.91 ratio         PTT - ( 21 Apr 2024 00:34 )  PTT:48.7 sec      SARS-CoV-2: Dalytec (16 Apr 2024 10:37)

## 2024-04-21 NOTE — PROGRESS NOTE ADULT - SUBJECTIVE AND OBJECTIVE BOX
DATE OF SERVICE: 04-21-24 @ 23:02    Patient is a 58y old  Female who presents with a chief complaint of Bradycardia (21 Apr 2024 13:55)           PHYSICAL EXAM:  T(C): 36.3 (04-21-24 @ 20:19), Max: 36.8 (04-21-24 @ 11:22)  HR: 56 (04-21-24 @ 20:19) (48 - 56)  BP: 98/47 (04-21-24 @ 20:19) (95/45 - 102/49)  RR: 20 (04-21-24 @ 20:19) (17 - 26)  SpO2: 93% (04-21-24 @ 20:19) (93% - 98%)  Wt(kg): --  I&O's Summary    20 Apr 2024 07:01  -  21 Apr 2024 07:00  --------------------------------------------------------  IN: 1060 mL / OUT: 1900 mL / NET: -840 mL    21 Apr 2024 07:01  -  21 Apr 2024 23:02  --------------------------------------------------------  IN: 480 mL / OUT: 0 mL / NET: 480 mL          Appearance: In no distress	  HEENT:    PERRL, EOMI	  Cardiovascular:  S1 S2, No JVD  Respiratory: Lungs clear to auscultation	  Gastrointestinal:  Soft, Non-tender, + BS	  Vascularature:  No edema of LE  Psychiatric: Appropriate affect   Neuro: no acute focal deficits                               12.4   5.19  )-----------( 241      ( 21 Apr 2024 00:35 )             36.9     04-21    138  |  103  |  17  ----------------------------<  108<H>  4.4   |  23  |  0.63    Ca    9.6      21 Apr 2024 00:34  Phos  4.0     04-21  Mg     2.4     04-21    TPro  6.4  /  Alb  3.8  /  TBili  0.2  /  DBili  x   /  AST  39  /  ALT  31  /  AlkPhos  84  04-21        Labs personally reviewed      ASSESSMENT/PLAN: 	    Sinus bradycardia with frequent PVCs, hypotension   - currently in bigeminy HR in 60s, BP stable  - TFTs wnl   - Echo EF 63%, septal abnormality, RVF wnl  - Extensive serology ordered to figure out etiology including HIV (-), RF -, Lyme -, Leptospirosis, ferritin wnl, LONG pend.   - Plan for [facility] Monday to r/o CAD, discussed with pt and daughter, agreeable to proceed   - EP following    We will be covered by Dr Mauricio Bella from Monday April 22nd to Thursday April 25th. He can be reached at [phone]      Cem Tovar DO formerly [facility]  Cardiovascular Medicine  [address] [place] [address], [place] [address]  Office: [phone]  Available via Text/call on Microsoft Teams

## 2024-04-21 NOTE — PROGRESS NOTE ADULT - PROBLEM SELECTOR PLAN 1
- TFTs wnl   - Echo EF 63%, septal abnormality, RVF wnl  - Extensive serology ordered to figure out etiology including HIV (-), RF -, Lyme -, Leptospirosis, ferritin wnl, LONG pend.   EKG on 4/20 showing sinus bradycardia with ocassional PVCs and low voltage QRS  - Now off Midodrine   - Pending  LHC & RHC  - EP following for further recs there is discussion of a possible EP study  - [facility]

## 2024-04-22 ENCOUNTER — TRANSCRIPTION ENCOUNTER (OUTPATIENT)
Age: 59
End: 2024-04-22

## 2024-04-22 ENCOUNTER — NON-APPOINTMENT (OUTPATIENT)
Age: 59
End: 2024-04-22

## 2024-04-22 VITALS
RESPIRATION RATE: 18 BRPM | OXYGEN SATURATION: 97 % | SYSTOLIC BLOOD PRESSURE: 94 MMHG | DIASTOLIC BLOOD PRESSURE: 47 MMHG | HEART RATE: 72 BPM

## 2024-04-22 PROBLEM — E05.90 THYROTOXICOSIS, UNSPECIFIED WITHOUT THYROTOXIC CRISIS OR STORM: Chronic | Status: ACTIVE | Noted: 2024-04-16

## 2024-04-22 PROBLEM — Z00.00 ENCOUNTER FOR PREVENTIVE HEALTH EXAMINATION: Status: ACTIVE | Noted: 2024-04-22

## 2024-04-22 LAB
% ALBUMIN: 60.1 % — SIGNIFICANT CHANGE UP
% ALPHA 1: 6 % — SIGNIFICANT CHANGE UP
% ALPHA 2: 14.3 % — SIGNIFICANT CHANGE UP
% BETA: 11.4 % — SIGNIFICANT CHANGE UP
% GAMMA: 8.2 % — SIGNIFICANT CHANGE UP
ACE SERPL-CCNC: 39 U/L — SIGNIFICANT CHANGE UP (ref 14–82)
ALBUMIN SERPL ELPH-MCNC: 3.5 G/DL — LOW (ref 3.6–5.5)
ALBUMIN/GLOB SERPL ELPH: 1.5 RATIO — SIGNIFICANT CHANGE UP
ALPHA1 GLOB SERPL ELPH-MCNC: 0.4 G/DL — SIGNIFICANT CHANGE UP (ref 0.1–0.4)
ALPHA2 GLOB SERPL ELPH-MCNC: 0.8 G/DL — SIGNIFICANT CHANGE UP (ref 0.5–1)
ANA TITR SER: NEGATIVE — SIGNIFICANT CHANGE UP
ANION GAP SERPL CALC-SCNC: 12 MMOL/L — SIGNIFICANT CHANGE UP (ref 5–17)
AUTO DIFF PNL BLD: NEGATIVE — SIGNIFICANT CHANGE UP
B-GLOBULIN SERPL ELPH-MCNC: 0.7 G/DL — SIGNIFICANT CHANGE UP (ref 0.5–1)
BUN SERPL-MCNC: 13 MG/DL — SIGNIFICANT CHANGE UP (ref 7–23)
C-ANCA SER-ACNC: NEGATIVE — SIGNIFICANT CHANGE UP
CALCIUM SERPL-MCNC: 9.4 MG/DL — SIGNIFICANT CHANGE UP (ref 8.4–10.5)
CHLORIDE SERPL-SCNC: 104 MMOL/L — SIGNIFICANT CHANGE UP (ref 96–108)
CO2 SERPL-SCNC: 23 MMOL/L — SIGNIFICANT CHANGE UP (ref 22–31)
CREAT SERPL-MCNC: 0.61 MG/DL — SIGNIFICANT CHANGE UP (ref 0.5–1.3)
DSDNA AB FLD-ACNC: <0.2 AI — SIGNIFICANT CHANGE UP
DSDNA AB SER-ACNC: <1 IU/ML — SIGNIFICANT CHANGE UP
EGFR: 102 ML/MIN/1.73M2 — SIGNIFICANT CHANGE UP
ENA SS-A AB FLD IA-ACNC: <0.2 AI — SIGNIFICANT CHANGE UP
GAMMA GLOBULIN: 0.5 G/DL — LOW (ref 0.6–1.6)
GLUCOSE SERPL-MCNC: 86 MG/DL — SIGNIFICANT CHANGE UP (ref 70–99)
HCT VFR BLD CALC: 38.3 % — SIGNIFICANT CHANGE UP (ref 34.5–45)
HGB BLD-MCNC: 12.5 G/DL — SIGNIFICANT CHANGE UP (ref 11.5–15.5)
LEPTOSPIRA AB TITR SER: NEGATIVE — SIGNIFICANT CHANGE UP
MAGNESIUM SERPL-MCNC: 2.2 MG/DL — SIGNIFICANT CHANGE UP (ref 1.6–2.6)
MCHC RBC-ENTMCNC: 30.9 PG — SIGNIFICANT CHANGE UP (ref 27–34)
MCHC RBC-ENTMCNC: 32.6 GM/DL — SIGNIFICANT CHANGE UP (ref 32–36)
MCV RBC AUTO: 94.6 FL — SIGNIFICANT CHANGE UP (ref 80–100)
MPO AB + PR3 PNL SER: SIGNIFICANT CHANGE UP
NRBC # BLD: 0 /100 WBCS — SIGNIFICANT CHANGE UP (ref 0–0)
P-ANCA SER-ACNC: NEGATIVE — SIGNIFICANT CHANGE UP
PHOSPHATE SERPL-MCNC: 3.4 MG/DL — SIGNIFICANT CHANGE UP (ref 2.5–4.5)
PLATELET # BLD AUTO: 267 K/UL — SIGNIFICANT CHANGE UP (ref 150–400)
POTASSIUM SERPL-MCNC: 4.2 MMOL/L — SIGNIFICANT CHANGE UP (ref 3.5–5.3)
POTASSIUM SERPL-SCNC: 4.2 MMOL/L — SIGNIFICANT CHANGE UP (ref 3.5–5.3)
PROT PATTERN SERPL ELPH-IMP: SIGNIFICANT CHANGE UP
PROT SERPL-MCNC: 5.9 G/DL — LOW (ref 6–8.3)
RBC # BLD: 4.05 M/UL — SIGNIFICANT CHANGE UP (ref 3.8–5.2)
RBC # FLD: 12.7 % — SIGNIFICANT CHANGE UP (ref 10.3–14.5)
SODIUM SERPL-SCNC: 139 MMOL/L — SIGNIFICANT CHANGE UP (ref 135–145)
WBC # BLD: 6.16 K/UL — SIGNIFICANT CHANGE UP (ref 3.8–10.5)
WBC # FLD AUTO: 6.16 K/UL — SIGNIFICANT CHANGE UP (ref 3.8–10.5)

## 2024-04-22 PROCEDURE — 99239 HOSP IP/OBS DSCHRG MGMT >30: CPT

## 2024-04-22 PROCEDURE — 93010 ELECTROCARDIOGRAM REPORT: CPT

## 2024-04-22 RX ORDER — SODIUM CHLORIDE 9 MG/ML
1000 INJECTION INTRAMUSCULAR; INTRAVENOUS; SUBCUTANEOUS
Refills: 0 | Status: DISCONTINUED | OUTPATIENT
Start: 2024-04-22 | End: 2024-04-22

## 2024-04-22 RX ORDER — SODIUM CHLORIDE 9 MG/ML
250 INJECTION INTRAMUSCULAR; INTRAVENOUS; SUBCUTANEOUS ONCE
Refills: 0 | Status: COMPLETED | OUTPATIENT
Start: 2024-04-22 | End: 2024-04-22

## 2024-04-22 RX ADMIN — SODIUM CHLORIDE 75 MILLILITER(S): 9 INJECTION INTRAMUSCULAR; INTRAVENOUS; SUBCUTANEOUS at 08:43

## 2024-04-22 RX ADMIN — SODIUM CHLORIDE 75 MILLILITER(S): 9 INJECTION INTRAMUSCULAR; INTRAVENOUS; SUBCUTANEOUS at 10:41

## 2024-04-22 RX ADMIN — HEPARIN SODIUM 5000 UNIT(S): 5000 INJECTION INTRAVENOUS; SUBCUTANEOUS at 12:41

## 2024-04-22 RX ADMIN — SODIUM CHLORIDE 250 MILLILITER(S): 9 INJECTION INTRAMUSCULAR; INTRAVENOUS; SUBCUTANEOUS at 08:43

## 2024-04-22 RX ADMIN — HEPARIN SODIUM 5000 UNIT(S): 5000 INJECTION INTRAVENOUS; SUBCUTANEOUS at 05:10

## 2024-04-22 RX ADMIN — POLYETHYLENE GLYCOL 3350 17 GRAM(S): 17 POWDER, FOR SOLUTION ORAL at 05:09

## 2024-04-22 NOTE — PROGRESS NOTE ADULT - PROVIDER SPECIALTY LIST ADULT
Cardiology
Infectious Disease
OFELIA
OFELIA
Cardiology
Infectious Disease
OFELIA
Electrophysiology
Endocrinology
OFELIA
Internal Medicine
Internal Medicine

## 2024-04-22 NOTE — PROGRESS NOTE ADULT - REASON FOR ADMISSION
Bradycardia

## 2024-04-22 NOTE — PROGRESS NOTE ADULT - PROBLEM SELECTOR PLAN 3
with proptosis  - Thyroid US: 1.9 cm nodule of low sonographic suspicion in the left thyroid lobe. Suggest continued annual sonographic surveillance.  - TFTs nl   - Endocrine following recs to hold methimazole for now and repeat TFTs Outpatient
with proptosis  - Thyroid US: 1.9 cm nodule of low sonographic suspicion in the left thyroid lobe. Suggest continued annual sonographic surveillance.  - TFTs nl   - Endocrine following recs to hold methimazole for now and repeat TFTs OP

## 2024-04-22 NOTE — PROGRESS NOTE ADULT - ASSESSMENT
59 y/o F with PMH of hyperthyroidism on methimazole presenting with right-sided chest pain, cough, headache on 4/16. Also had chills and sweats. Patient had 1 episode of hemoptysis 2 days before presentation and associated left-sided headache. Patients coworker had COVID the week prior to presentation. EKG in the ED revealed sinus bradycardia with frequent PVCs. Febrile to 101.2 on admission. RVP and UA negative, CTA negative for PE. Also reports taking Methimazole at home without consistent appts or TSH being checked with question of methimazole toxicity. Hypotensive to 80/40 on admission despite 2L of IVF so started on Levophed. EP consulted and said no need for TVP at this time. Admitted to CICU for further workup. She was started on midodrine and found to be parainfluenza positive.

## 2024-04-22 NOTE — DISCHARGE NOTE PROVIDER - NSDCCPGOAL_GEN_ALL_CORE_FT
COMMENTS:  37 wks gestational age infant born via . Stable temperatures in radiant warmer. Serum total bilirubin of 3.1; remains below threshold for phototherapy. Urine for CMV is pending. OT/SLP/PT following     PLANS:  - Provide developmentally supportive care, as tolerated  - Follow with PT/OT/SLP   - Follow pending urine CMV   - Attempt to wean to open crib    To get better and follow your care plan as instructed.

## 2024-04-22 NOTE — PROGRESS NOTE ADULT - PROBLEM SELECTOR PLAN 1
- TFTs wnl   - Echo EF 63%, septal abnormality, RVF wnl  - Extensive serology ordered to figure out etiology including HIV (-), RF -, Lyme -, Leptospirosis, ferritin wnl, LONG pend.   - EKG on 4/20 showing sinus bradycardia with occasional PVCs and low voltage QRS  - Now off Midodrine   - s/p - LHC shows non-obstructive disease with normal RA pressures  - 4/22 Discussed with cardiology team, they rec EP eval > reconsulted EP team as concern for ongoing bradycardia > per EP CO, SVR wnl, no indication for ablation or pacemaker, they rec outpatient monitoring with 2 lead Zio monitor. F/U EP, cards outpatient.  - Zio monitor to be placed prior to discharge. - TFTs wnl   - Echo EF 63%, septal abnormality, RVF wnl  - Extensive serology ordered to figure out etiology including HIV (-), RF -, Lyme -, Leptospirosis, ferritin wnl, LONG pend.   - EKG on 4/20 showing sinus bradycardia with occasional PVCs and low voltage QRS  - Now off Midodrine   - s/p - LHC shows non-obstructive disease with normal RA pressures  - 4/22 Discussed with cardiology team, they rec EP eval > reconsulted EP team as concern for ongoing bradycardia > per EP CO, SVR wnl, no indication for ablation or pacemaker, they rec outpatient monitoring with 2 lead Zio monitor. F/U EP, cards outpatient.  - Discussed discharge plan with patient and cardiology > patient has insurance issues per , we will arrange for appointment/follow up with IM residents and cardiology fellows clinics.

## 2024-04-22 NOTE — DISCHARGE NOTE PROVIDER - PROVIDER TOKENS
PROVIDER:[TOKEN:[[id number]],SCHEDULEDAPPT:[05/20/2024],SCHEDULEDAPPTTIME:[02:30 PM]],FREE:[LAST:[Ammari],PHONE:[(   )    -],FAX:[(   )    -],ADDRESS:[Internal Medicine  [address] [place] [address].    MUST APPLY for SLIDING SCDALE PROGRAM due to no insurance],SCHEDULEDAPPT:[04/29/2024],SCHEDULEDAPPTTIME:[08:30 AM]]

## 2024-04-22 NOTE — DISCHARGE NOTE NURSING/CASE MANAGEMENT/SOCIAL WORK - PATIENT PORTAL LINK FT
You can access the FollowMyHealth Patient Portal offered by [facility] by registering at the following website: [email or web address]. By joining [facility]’s FollowMyHealth portal, you will also be able to view your health information using other applications (apps) compatible with our system.

## 2024-04-22 NOTE — PROGRESS NOTE ADULT - SUBJECTIVE AND OBJECTIVE BOX
[facility] Division of Hospital Medicine  Kae Ball MD M-F, 8A-5P: MS Teams, Pager  Other Times: HIC Extension / HIC Pager      Patient is a 58y old  Female who presents with a chief complaint of Bradycardia (2024 11:48)      SUBJECTIVE / OVERNIGHT EVENTS:  Patient scheduled for [facility] today.   Per cards s/p LHC non obstructive disease RFA/RFV cath access       ADDITIONAL REVIEW OF SYSTEMS:    MEDICATIONS  (STANDING):  heparin   Injectable 5000 Unit(s) SubCutaneous every 8 hours  melatonin 1 milliGRAM(s) Oral at bedtime  polyethylene glycol 3350 17 Gram(s) Oral two times a day  senna 2 Tablet(s) Oral at bedtime  sodium chloride 0.9%. 1000 milliLiter(s) (75 mL/Hr) IV Continuous <Continuous>  sodium chloride 0.9%. 1000 milliLiter(s) (75 mL/Hr) IV Continuous <Continuous>    MEDICATIONS  (PRN):  bisacodyl 5 milliGRAM(s) Oral every 12 hours PRN Constipation      CAPILLARY BLOOD GLUCOSE          I&O's Summary    2024 07:01  -  2024 07:00  --------------------------------------------------------  IN: 480 mL / OUT: 0 mL / NET: 480 mL        Daily     Daily Weight in k.4 (2024 08:00)    PHYSICAL EXAM:  Vital Signs Last 24 Hrs  T(C): 36.7 (2024 10:00), Max: 37.1 (2024 04:24)  T(F): 98 (2024 10:00), Max: 98.7 (2024 04:24)  HR: 72 (2024 15:00) (42 - 72)  BP: 94/47 (2024 15:00) (89/52 - 109/55)  BP(mean): 70 (2024 11:30) (68 - 78)  RR: 18 (2024 15:00) (16 - 20)  SpO2: 97% (2024 15:00) (93% - 98%)    Parameters below as of 2024 15:00  Patient On (Oxygen Delivery Method): room air      CONSTITUTIONAL: NAD, well-developed, well-groomed  EYES: PERRLA; conjunctiva and sclera clear  ENMT: Moist oral mucosa, no pharyngeal injection or exudates; normal dentition  NECK: Supple, no palpable masses; no thyromegaly  RESPIRATORY: Normal respiratory effort; lungs are clear to auscultation bilaterally  CARDIOVASCULAR: Regular rate and rhythm, normal S1 and S2, no murmur/rub/gallop; No lower extremity edema; Peripheral pulses are 2+ bilaterally  ABDOMEN: Nontender to palpation, normoactive bowel sounds, no rebound/guarding; No hepatosplenomegaly  MUSCULOSKELETAL:  no clubbing or cyanosis of digits; no joint swelling or tenderness to palpation, moving all extremities   PSYCH: A+O to person, place, and time; affect appropriate  NEUROLOGY: CN 2-12 are intact and symmetric; no gross sensory/motor deficits   SKIN: No rashes; no palpable lesions    LABS:                        12.5   6.16  )-----------( 267      ( 2024 06:10 )             38.3     04-    139  |  104  |  13  ----------------------------<  86  4.2   |  23  |  0.61    Ca    9.4      2024 06:12  Phos  3.4     04-22  Mg     2.2     -    TPro  6.4  /  Alb  3.8  /  TBili  0.2  /  DBili  x   /  AST  39  /  ALT  31  /  AlkPhos  84  04-21    LIVER FUNCTIONS - ( 2024 00:34 )  Alb: 3.8 g/dL / Pro: 6.4 g/dL / ALK PHOS: 84 U/L / ALT: 31 U/L / AST: 39 U/L / GGT: x           PT/INR - ( 2024 00:34 )   PT: 9.6 sec;   INR: 0.91 ratio         PTT - ( 2024 00:34 )  PTT:48.7 sec      Urinalysis Basic - ( 2024 06:12 )    Color: x / Appearance: x / SG: x / pH: x  Gluc: 86 mg/dL / Ketone: x  / Bili: x / Urobili: x   Blood: x / Protein: x / Nitrite: x   Leuk Esterase: x / RBC: x / WBC x   Sq Epi: x / Non Sq Epi: x / Bacteria: x        SARS-CoV-2: NotDetec (2024 10:37)      RADIOLOGY & ADDITIONAL TESTS:  Results Reviewed:   Imaging Personally Reviewed:  Electrocardiogram Personally Reviewed:    COORDINATION OF CARE:  Care Discussed with Consultants/Other Providers [Y/N]:  Prior or Outpatient Records Reviewed [Y/N]:   [facility] Division of Hospital Medicine  Kae Ball MD M-F, 8A-5P: MS Teams, Pager  Other Times: HIC Extension / HIC Pager      Patient is a 58y old  Female who presents with a chief complaint of Bradycardia (2024 11:48)      SUBJECTIVE / OVERNIGHT EVENTS:  Patient scheduled for C today.   Per cards s/p LHC non obstructive disease RFA/RFV cath access.  Persian  [id number]. Discussed cardiologist's recs and discharge plan with patient.       ADDITIONAL REVIEW OF SYSTEMS:    MEDICATIONS  (STANDING):  heparin   Injectable 5000 Unit(s) SubCutaneous every 8 hours  melatonin 1 milliGRAM(s) Oral at bedtime  polyethylene glycol 3350 17 Gram(s) Oral two times a day  senna 2 Tablet(s) Oral at bedtime  sodium chloride 0.9%. 1000 milliLiter(s) (75 mL/Hr) IV Continuous <Continuous>  sodium chloride 0.9%. 1000 milliLiter(s) (75 mL/Hr) IV Continuous <Continuous>    MEDICATIONS  (PRN):  bisacodyl 5 milliGRAM(s) Oral every 12 hours PRN Constipation      CAPILLARY BLOOD GLUCOSE          I&O's Summary    2024 07:01  -  2024 07:00  --------------------------------------------------------  IN: 480 mL / OUT: 0 mL / NET: 480 mL        Daily     Daily Weight in k.4 (2024 08:00)    PHYSICAL EXAM:  Vital Signs Last 24 Hrs  T(C): 36.7 (2024 10:00), Max: 37.1 (2024 04:24)  T(F): 98 (2024 10:00), Max: 98.7 (2024 04:24)  HR: 72 (2024 15:00) (42 - 72)  BP: 94/47 (2024 15:00) (89/52 - 109/55)  BP(mean): 70 (2024 11:30) (68 - 78)  RR: 18 (2024 15:00) (16 - 20)  SpO2: 97% (2024 15:00) (93% - 98%)    Parameters below as of 2024 15:00  Patient On (Oxygen Delivery Method): room air      CONSTITUTIONAL: NAD, well-developed, well-groomed  EYES: PERRLA; conjunctiva and sclera clear  ENMT: Moist oral mucosa, no pharyngeal injection or exudates; normal dentition  NECK: Supple, no palpable masses; no thyromegaly  RESPIRATORY: Normal respiratory effort; lungs are clear to auscultation bilaterally  CARDIOVASCULAR: Regular rate and rhythm, normal S1 and S2, no murmur/rub/gallop; No lower extremity edema; Peripheral pulses are 2+ bilaterally  ABDOMEN: Nontender to palpation, normoactive bowel sounds, no rebound/guarding; No hepatosplenomegaly  MUSCULOSKELETAL:  no clubbing or cyanosis of digits; no joint swelling or tenderness to palpation, moving all extremities   PSYCH: A+O to person, place, and time; affect appropriate  NEUROLOGY: CN 2-12 are intact and symmetric; no gross sensory/motor deficits   SKIN: No rashes; no palpable lesions    LABS:                        12.5   6.16  )-----------( 267      ( 2024 06:10 )             38.3     04-22    139  |  104  |  13  ----------------------------<  86  4.2   |  23  |  0.61    Ca    9.4      2024 06:12  Phos  3.4     04-22  Mg     2.2     04-22    TPro  6.4  /  Alb  3.8  /  TBili  0.2  /  DBili  x   /  AST  39  /  ALT  31  /  AlkPhos  84  04-21    LIVER FUNCTIONS - ( 2024 00:34 )  Alb: 3.8 g/dL / Pro: 6.4 g/dL / ALK PHOS: 84 U/L / ALT: 31 U/L / AST: 39 U/L / GGT: x           PT/INR - ( 2024 00:34 )   PT: 9.6 sec;   INR: 0.91 ratio         PTT - ( 2024 00:34 )  PTT:48.7 sec      Urinalysis Basic - ( 2024 06:12 )    Color: x / Appearance: x / SG: x / pH: x  Gluc: 86 mg/dL / Ketone: x  / Bili: x / Urobili: x   Blood: x / Protein: x / Nitrite: x   Leuk Esterase: x / RBC: x / WBC x   Sq Epi: x / Non Sq Epi: x / Bacteria: x        SARS-CoV-2: NotDetec (2024 10:37)      RADIOLOGY & ADDITIONAL TESTS:  Results Reviewed:   Imaging Personally Reviewed:  Electrocardiogram Personally Reviewed:    COORDINATION OF CARE:  Care Discussed with Consultants/Other Providers [Y/N]:  Prior or Outpatient Records Reviewed [Y/N]:

## 2024-04-22 NOTE — DISCHARGE NOTE PROVIDER - CARE PROVIDER_API CALL
Carmencita Rios  Cardiovascular Disease  [address] [place] [address]  Phone: [phone]  Fax: [phone]  Scheduled Appointment: 05/20/2024 02:30 PM    Jayjay,   Internal Medicine  [address] [facility].    MUST APPLY for [facility] due to no insurance  Phone: (   )    -  Fax: (   )    -  Scheduled Appointment: 04/29/2024 08:30 AM

## 2024-04-22 NOTE — DISCHARGE NOTE PROVIDER - NSDCFUSCHEDAPPT_GEN_ALL_CORE_FT
[facility]  INTMED OP [id number]  Scheduled Appointment: 04/29/2024     [facility]  INTMED OP [id number]  Scheduled Appointment: 04/29/2024    [facility]  ENDOCRIN [address]  Scheduled Appointment: 05/30/2024

## 2024-04-22 NOTE — PROGRESS NOTE ADULT - PROBLEM SELECTOR PLAN 4
DVT ppx; heparin SQ  Diet: Regular  Pending cards/ EP workup  Discussed plan with pt at bedside.
DVT ppx; heparin SQ  Diet: Regular      Discharge planning 50mins

## 2024-04-22 NOTE — DISCHARGE NOTE PROVIDER - NSDCCPCAREPLAN_GEN_ALL_CORE_FT
PRINCIPAL DISCHARGE DIAGNOSIS  Diagnosis: Sinus bradycardia  Assessment and Plan of Treatment: EKG in the ER showed slow heart rate called sinus bradycardia with frequent PVCs.   - You were admitted to the ICU for low blood pressure to 80/40 on admission even after intravenous fluids 2L and was given medication called pressors to improve your blood pressure. Now blood pressures are better  - Echo EF 63%, septal abnormality  - on 4/22 you had angiogram which showe dno blockages of your heart arteries. shows non-obstructive disease with normal RA pressures  - You need to follow up with outpatient cardiologist and medicine doctors for continued monitoring of your heart rate  - Arranged for appointment/follow up with IM residents and cardiology fellows clinics.      SECONDARY DISCHARGE DIAGNOSES  Diagnosis: Parainfluenza  Assessment and Plan of Treatment: You presented with chills and sweats, had 1 episode of hemoptysis 2 days prior associated left-sided headache.   Found to have fever of 101.2 on admission.   - You were admitted to the ICU for low blood pressure to 80/40 on admission even after intravenous fluids 2L and was given medication called pressors to improve your blood pressure. Now blood pressures are better  Found to have a viral infection called Parainfluenza  - You got intravenous antibiotics called Cefepime course (4/17-19) for possible bacterial infection  - You had CT of Head, CT face, and CT of abdomen and Pelvis which only demonstrated inflammation in the paranasal sinuses  Call you Health care provider upon arrival home to make a one week follow up appointment.  If you develop fever, chills, malaise, or change in mental status call your Health Care Provider or go to the Emergency Department.  Nutrition is important, eat small frequent meals to help ensure you get adequate calories.  Do not stay in bed all day!  Increase your activity daily as tolerated.      Diagnosis: Graves disease  Assessment and Plan of Treatment: You report taking Methimazole at home without regular doctor's follow up and regular blood work to check levels of medication adn thyroid levels  - Thyroid ultrasound showed: 1.9 cm nodule of low sonographic suspicion in the left thyroid lobe. You need follow up regularly to monitor  - Thyroid function test in 1 week  - Endocrine recommends to HOLD methimazole for now and repeat TFTs Outpatient in 1 week      Diagnosis: Sinus bradycardia  Assessment and Plan of Treatment:      PRINCIPAL DISCHARGE DIAGNOSIS  Diagnosis: Sinus bradycardia  Assessment and Plan of Treatment: EKG in the ER showed slow heart rate called sinus bradycardia with frequent PVCs.   - You were admitted to the ICU for low blood pressure to 80/40 on admission even after intravenous fluids 2L and was given medication called pressors to improve your blood pressure. Now blood pressures are better  - Echo EF 63%, septal abnormality  - on 4/22 you had angiogram of your heart  which showed no blockages of your heart arteries.   They used your right groin to do this study. No heavy lifting, strenuous activity, bending, straining or unnecessary stair climbing for 2 weeks. No sex for 1 week. No driving for 2 days. You may shower 24 hours following procedure but avoid baths and swimming for 1 week. Check groin site for bleeding and/or swelling daily following procedure. Call your doctor/cardiologist immediately for bleeding or swelling or if you have increased/persistent pain, fever/chills, or drainage at the site. Follow up with your cardiologist in 1- 2 weeks. You may call [facility] at [phone] or [phone] after office hours and weekends with any questions or concerns following our procedure.  - You need to follow up with outpatient cardiologist and medicine doctors for continued monitoring of your heart rate - Appointments take already        SECONDARY DISCHARGE DIAGNOSES  Diagnosis: Parainfluenza  Assessment and Plan of Treatment: You presented with chills and sweats, had 1 episode of hemoptysis 2 days prior associated left-sided headache.   Found to have fever of 101.2 on admission.   - You were admitted to the ICU for low blood pressure to 80/40 on admission even after intravenous fluids 2L and was given medication called pressors to improve your blood pressure. Now blood pressures are better  Found to have a viral infection called Parainfluenza  - You got intravenous antibiotics called Cefepime course (4/17-19) for possible bacterial infection  - You had CT of Head, CT face, and CT of abdomen and Pelvis which only demonstrated inflammation in the paranasal sinuses  Call you Health care provider upon arrival home to make a one week follow up appointment.  If you develop fever, chills, malaise, or change in mental status call your Health Care Provider or go to the Emergency Department.  Nutrition is important, eat small frequent meals to help ensure you get adequate calories.  Do not stay in bed all day!  Increase your activity daily as tolerated.      Diagnosis: Graves disease  Assessment and Plan of Treatment: You report taking Methimazole at home without regular doctor's follow up and regular blood work to check levels of medication adn thyroid levels  - Thyroid ultrasound showed: 1.9 cm nodule of low sonographic suspicion in the left thyroid lobe. You need follow up regularly to monitor  - Thyroid function test in 1 week  - Endocrine recommends to HOLD methimazole for now and repeat TFTs Outpatient in 1 week

## 2024-04-22 NOTE — DISCHARGE NOTE PROVIDER - CARE PROVIDERS DIRECT ADDRESSES
[email or web address]

## 2024-04-22 NOTE — CHART NOTE - NSCHARTNOTEFT_GEN_A_CORE
Pt arrived to [facility]   via  patient stated she is having chest pinching/ 2/10 started last night  Tele SB 40s with frequent PVCs  no change on 12 lead EKG   Pt seen by Dr. Mosley planned for RHC/LHC   diagnosic procedure today non obstructive disease RFA/RFV cath access   gentle hydration

## 2024-04-22 NOTE — PROGRESS NOTE ADULT - PROBLEM SELECTOR PLAN 2
- s/p Cefepime course (4/17-19)  - UA 4/16 unremarkable  - Limited RVP 4/16 Negative  - Bcx 4/16 NGTD  - MRSA PCR collected  - QuantiFeron collected  - Found to be parainfluenza positive on RPP  - S/p CTH, CT maxillofacial, and CTAP which only demonstrated inflammation in the paranasal sinuses  - monitor and trend WBC and temperature curve   - ID following, symptomatic care for now
- s/p Cefepime course (4/17-19)  - UA 4/16 unremarkable  - Limited RVP 4/16 Negative  - Bcx 4/16 NGTD  - MRSA PCR collected  - QuantiFeron collected  - Found to be parainfluenza positive on RPP  - S/p CTH, CT maxillofacial, and CTAP which only demonstrated inflammation in the paranasal sinuses  - monitor and trend WBC and temperature curve   - ID following, symptomatic care for now

## 2024-04-22 NOTE — DISCHARGE NOTE PROVIDER - HOSPITAL COURSE
HPI:  58-year-old female with a past medical history of hyperthyroidism on methimazole presenting with right-sided chest pain, cough, headache since Friday. Patient also complaining of chills and sweats. Patient had 1 episode of hemoptysis 2 days ago. associated left-sided headache. Patients coworker had COVID last week. EKG in the ED revealed sinus bradycardia with frequent PVCs. Febrile to 101.2 on admission. RVP and UA negative, CTA negative for PE. Also reports taking Methimazole at home without consistent appts or TSH being checked with question of methimazole toxicity. Hypotensive to 80/40 on admission despite 2L of IVF so started on Levophed. EP consulted and said no need for TVP at this time. Admitted to CICU for further workup. (16 Apr 2024 19:11)    Hospital Course:  EKG in the ED revealed sinus bradycardia with frequent PVCs. Febrile to 101.2 on admission. RVP and UA negative, CTA negative for PE. Also reports taking Methimazole at home without consistent appts or TSH being checked with question of methimazole toxicity. Hypotensive to 80/40 on admission despite 2L of IVF so started on Levophed. EP consulted and said no need for TVP at this time. Admitted to CICU for further workup. She was started on midodrine and found to be parainfluenza positive.     # Sinus bradycardia.   - EKG on 4/20 showing sinus bradycardia with occasional PVCs and low voltage QRS  - TFTs wnl   - Echo EF 63%, septal abnormality, RVF wnl  - Extensive serology ordered to figure out etiology including HIV (-), RF -, Lyme -, Leptospirosis, ferritin wnl, LONG pend.   - Now off Midodrine   - s/p - LHC shows non-obstructive disease with normal RA pressures  - 4/22 Discussed with cardiology team, they rec EP eval > reconsulted EP team as concern for ongoing bradycardia > per EP CO, SVR wnl, no indication for ablation or pacemaker, they rec outpatient monitoring with 2 lead Zio monitor. F/U EP, cards outpatient.  - Arranged for appointment/follow up with IM residents and cardiology fellows clinics.    # Parainfluenza.   - s/p Cefepime course (4/17-19)  - UA 4/16 unremarkable  - Limited RVP 4/16 Negative  - Bcx 4/16 NGTD  - QuantiFeron collected  - S/p CTH, CT maxillofacial, and CTAP which only demonstrated inflammation in the paranasal sinuses  - Per ID symptomatic care for now.    # Graves disease.   - with proptosis  - Thyroid US: 1.9 cm nodule of low sonographic suspicion in the left thyroid lobe. Suggest continued annual sonographic surveillance.  - TFTs nl   - Endocrine following recs to hold methimazole for now and repeat TFTs Outpatient.    Important Medication Changes and Reason:  - Hold methimazole for now and repeat TFTs Outpatient.    Active or Pending Issues Requiring Follow-up:    Advanced Directives:   [X] Full code  [ ] DNR  [ ] Hospice    Discharge Diagnoses:  # Sinus bradycardia.   # Parainfluenza.   # Graves disease         HPI:  58-year-old female with a past medical history of hyperthyroidism on methimazole presenting with right-sided chest pain, cough, headache since Friday. Patient also complaining of chills and sweats. Patient had 1 episode of hemoptysis 2 days ago. associated left-sided headache. Patients coworker had COVID last week. EKG in the ED revealed sinus bradycardia with frequent PVCs. Febrile to 101.2 on admission. RVP and UA negative, CTA negative for PE. Also reports taking Methimazole at home without consistent appts or TSH being checked with question of methimazole toxicity. Hypotensive to 80/40 on admission despite 2L of IVF so started on Levophed. EP consulted and said no need for TVP at this time. Admitted to CICU for further workup. (16 Apr 2024 19:11)    Hospital Course:  EKG in the ED revealed sinus bradycardia with frequent PVCs. Febrile to 101.2 on admission. RVP and UA negative, CTA negative for PE. Also reports taking Methimazole at home without consistent appts or TSH being checked with question of methimazole toxicity. Hypotensive to 80/40 on admission despite 2L of IVF so started on Levophed. EP consulted and said no need for TVP at this time. Admitted to CICU for further workup. She was started on midodrine and found to be parainfluenza positive.     # Sinus bradycardia.   - EKG on 4/20 showing sinus bradycardia with occasional PVCs and low voltage QRS  - TFTs wnl   - Echo EF 63%, septal abnormality, RVF wnl  - Extensive serology ordered to figure out etiology including HIV (-), RF -, Lyme -, Leptospirosis, ferritin wnl, LONG pend.   - Now off Midodrine   - s/p - LHC shows non-obstructive disease with normal RA pressures  - 4/22 Discussed with cardiology team, they rec EP eval > Follow up with EP team as concern for ongoing bradycardia > per EP CO, SVR wnl, no indication for ablation or pacemaker, they rec outpatient monitoring with 2 lead Zio monitor. F/U EP, cards outpatient.  - Arranged for appointment/follow up with IM residents and cardiology fellows clinics.    # Parainfluenza.   - s/p Cefepime course (4/17-19)  - UA 4/16 unremarkable  - Limited RVP 4/16 Negative  - Bcx 4/16 NGTD  - QuantiFeron collected  - S/p CTH, CT maxillofacial, and CTAP which only demonstrated inflammation in the paranasal sinuses  - Per ID symptomatic care for now.    # Graves disease.   - with proptosis  - Thyroid US: 1.9 cm nodule of low sonographic suspicion in the left thyroid lobe. Suggest continued annual sonographic surveillance.  - TFTs nl   - Endocrine following recs to hold methimazole for now and repeat TFTs Outpatient.    Important Medication Changes and Reason:  - Hold methimazole for now and repeat TFTs Outpatient.    Active or Pending Issues Requiring Follow-up:    Advanced Directives:   [X] Full code  [ ] DNR  [ ] Hospice    Discharge Diagnoses:  # Sinus bradycardia.   # Parainfluenza.   # Graves disease         HPI:  58-year-old female with a past medical history of hyperthyroidism on methimazole presenting with right-sided chest pain, cough, headache since Friday. Patient also complaining of chills and sweats. Patient had 1 episode of hemoptysis 2 days ago. associated left-sided headache. Patients coworker had COVID last week. EKG in the ED revealed sinus bradycardia with frequent PVCs. Febrile to 101.2 on admission. RVP and UA negative, CTA negative for PE. Also reports taking Methimazole at home without consistent appts or TSH being checked with question of methimazole toxicity. Hypotensive to 80/40 on admission despite 2L of IVF so started on Levophed. EP consulted and said no need for TVP at this time. Admitted to CICU for further workup. (16 Apr 2024 19:11)    Hospital Course:  EKG in the ED revealed sinus bradycardia with frequent PVCs. Febrile to 101.2 on admission. RVP and UA negative, CTA negative for PE. Also reports taking Methimazole at home without consistent appts or TSH being checked with question of methimazole toxicity. Hypotensive to 80/40 on admission despite 2L of IVF so started on Levophed. EP consulted and said no need for TVP at this time. Admitted to CICU for further workup. She was started on midodrine and found to be parainfluenza positive.     # Sinus bradycardia.   - EKG on 4/20 showing sinus bradycardia with occasional PVCs and low voltage QRS  - TFTs wnl   - Echo EF 63%, septal abnormality, RVF wnl  - Extensive serology ordered to figure out etiology including HIV (-), RF -, Lyme -, Leptospirosis, ferritin wnl, LONG pend.   - Now off Midodrine   - s/p - LHC shows non-obstructive disease with normal RA pressures  - 4/22 Discussed with cardiology team, they rec EP eval > Follow up with EP team as concern for ongoing bradycardia > per EP CO, SVR wnl, no indication for ablation or pacemaker, they rec outpatient monitoring with 2 lead Zio monitor. F/U EP, cards outpatient.  - Arranged for appointment/follow up with IM residents and cardiology fellows clinics.    # Parainfluenza.   - s/p Cefepime course (4/17-19)  - UA 4/16 unremarkable  - Limited RVP 4/16 Negative  - Bcx 4/16 NGTD  - QuantiFeron collected  - S/p CTH, CT maxillofacial, and CTAP which only demonstrated inflammation in the paranasal sinuses  - Per ID symptomatic care for now.    # Graves disease.   - with proptosis  - Thyroid US: 1.9 cm nodule of low sonographic suspicion in the left thyroid lobe. Suggest continued annual sonographic surveillance.  - TFTs nl   - Endocrine following recs to hold methimazole for now and repeat TFTs Outpatient.    Important Medication Changes and Reason:  - Hold methimazole for now and repeat TFTs Outpatient.    Active or Pending Issues Requiring Follow-up:  - Follow up with Cardiology/EP for concern for ongoing bradycardia > they rec outpatient monitoring with 2 lead Zio monitor.   - Hold methimazole for now and repeat TFTs Outpatient and considering resuming.    Advanced Directives:   [X] Full code  [ ] DNR  [ ] Hospice    Discharge Diagnoses:  # Sinus bradycardia.   # Parainfluenza.   # Graves disease         HPI:  58-year-old female with a past medical history of hyperthyroidism on methimazole presenting with right-sided chest pain, cough, headache since Friday. Patient also complaining of chills and sweats. Patient had 1 episode of hemoptysis 2 days ago. associated left-sided headache. Patients coworker had COVID last week. EKG in the ED revealed sinus bradycardia with frequent PVCs. Febrile to 101.2 on admission. RVP and UA negative, CTA negative for PE. Also reports taking Methimazole at home without consistent appts or TSH being checked with question of methimazole toxicity. Hypotensive to 80/40 on admission despite 2L of IVF so started on Levophed. EP consulted and said no need for TVP at this time. Admitted to CICU for further workup. (16 Apr 2024 19:11)    Hospital Course:  EKG in the ED revealed sinus bradycardia with frequent PVCs. Febrile to 101.2 on admission. RVP and UA negative, CTA negative for PE. Also reports taking Methimazole at home without consistent appts or TSH being checked with question of methimazole toxicity. Hypotensive to 80/40 on admission despite 2L of IVF so started on Levophed. EP consulted and said no need for TVP at this time. Admitted to CICU for further workup. She was started on midodrine and found to be parainfluenza positive.     # Sinus bradycardia.   - EKG on 4/20 showing sinus bradycardia with occasional PVCs and low voltage QRS  - TFTs wnl   - Echo EF 63%, septal abnormality, RVF wnl  - Extensive serology ordered to figure out etiology including HIV (-), RF -, Lyme -, Leptospirosis, ferritin wnl, LONG pend.   - Now off Midodrine   - s/p - LHC shows non-obstructive disease with normal RA pressures  - 4/22 Discussed with cardiology team, they rec EP eval > Follow up with EP team as concern for ongoing bradycardia > per EP CO, SVR wnl, no indication for ablation or pacemaker, they rec outpatient monitoring with 2 lead Zio monitor. F/U EP, cards outpatient.  - Arranged for appointment/follow up with IM residents and cardiology fellows clinics.    # Parainfluenza.   - s/p Cefepime course (4/17-19)  - UA 4/16 unremarkable  - Limited RVP 4/16 Negative  - Bcx 4/16 NGTD  - QuantiFeron collected  - S/p CTH, CT maxillofacial, and CTAP which only demonstrated inflammation in the paranasal sinuses  - Per ID symptomatic care for now.    # Graves disease.   - with proptosis  - Thyroid US: 1.9 cm nodule of low sonographic suspicion in the left thyroid lobe. Suggest continued annual sonographic surveillance.  - TFTs nl   - Endocrine following recs to hold methimazole for now and repeat TFTs Outpatient.    Important Medication Changes and Reason:  - Hold methimazole for now and repeat TFTs Outpatient.    Active or Pending Issues Requiring Follow-up:  - Follow up with Cardiology/EP for concern for ongoing bradycardia > they rec outpatient monitoring with 2 lead Zio monitor.   - Hold methimazole for now and repeat TFTs Outpatient and considering resuming.    Advanced Directives:   [X] Full code  [ ] DNR  [ ] Hospice    Discharge Diagnoses:  # Sinus bradycardia, Chest pain  # Parainfluenza.   # Graves disease

## 2024-04-22 NOTE — PROGRESS NOTE ADULT - SUBJECTIVE AND OBJECTIVE BOX
Cardiovascular Disease Progress Note  Date of service: 24 @ 11:48    Overnight events: No acute events overnight.  Patient is in no distress.   Otherwise review of systems negative    Objective Findings:  T(C): 36.7 (24 @ 10:00), Max: 37.1 (24 @ 04:24)  HR: 53 (24 @ 11:30) (42 - 63)  BP: 92/55 (24 @ 11:30) (89/52 - 109/55)  RR: 18 (24 @ 11:30) (16 - 20)  SpO2: 97% (24 @ 11:30) (93% - 98%)  Wt(kg): --  Daily     Daily Weight in k.4 (2024 08:00)      Physical Exam:  Gen: NAD; Patient resting comfortably  HEENT: EOMI, Normocephalic/ atraumatic  CV: RRR, normal S1 + S2, no m/r/g  Lungs:  Normal respiratory effort; clear to auscultation bilaterally  Abd: soft, non-tender; bowel sounds present  Ext: No edema; warm and well perfused    Telemetry: Sinus bradycardia with PVCs    Laboratory Data:                        12.5   6.16  )-----------( 267      ( 2024 06:10 )             38.3         139  |  104  |  13  ----------------------------<  86  4.2   |  23  |  0.61    Ca    9.4      2024 06:12  Phos  3.4       Mg     2.2         TPro  6.4  /  Alb  3.8  /  TBili  0.2  /  DBili  x   /  AST  39  /  ALT  31  /  AlkPhos  84  -    PT/INR - ( 2024 00:34 )   PT: 9.6 sec;   INR: 0.91 ratio         PTT - ( 2024 00:34 )  PTT:48.7 sec          Inpatient Medications:  MEDICATIONS  (STANDING):  heparin   Injectable 5000 Unit(s) SubCutaneous every 8 hours  melatonin 1 milliGRAM(s) Oral at bedtime  polyethylene glycol 3350 17 Gram(s) Oral two times a day  senna 2 Tablet(s) Oral at bedtime  sodium chloride 0.9%. 1000 milliLiter(s) (75 mL/Hr) IV Continuous <Continuous>  sodium chloride 0.9%. 1000 milliLiter(s) (75 mL/Hr) IV Continuous <Continuous>      Assessment:  Sinus bradycardia with frequent PVCs, hypotension   - TFTs wnl   - Echo EF 63%, septal abnormality, RVF wnl  - Extensive serology ordered to figure out etiology including HIV (-), RF -, Lyme -, Leptospirosis, ferritin wnl, LONG pend.   - LHC shows non-obstructive disease with normal RA pressures  - EP following, input appreciated.     #ACP (advance care planning)-  Advanced care planning was discussed with the patient.  Risks, benefits and alternatives of medical treatment and procedures were discussed in detail and all questions were answered. 30 additional minutes spent addressing advance care plans.        Over 55 minutes spent on total encounter; more than 50% of the visit was spent counseling and/or coordinating care by the attending physician.      Mauricio Bella DO [facility]  Cardiovascular Disease  [phone]

## 2024-04-22 NOTE — DISCHARGE NOTE PROVIDER - NSDCFUADDINST_GEN_ALL_CORE_FT
HOLD methimazole for now and repeat TFTs Outpatient in 1 week HOLD methimazole for now and repeat TFTs Outpatient in 1 week.  Follow up with EP team as concern for ongoing bradycardia, they rec outpatient monitoring with 2 lead Zio monitor. F/U EP, cards outpatient.    You will need to apply for Sliding Scale program since you have no insurance on the day of appointment at the medicine clinic on 4/29/24 - HOLD methimazole for now and repeat TFTs Outpatient in 1 week and considering resuming.  - Follow up with Cardiology/EP for concern for ongoing bradycardia > they rec outpatient monitoring with 2 lead Zio monitor.       You will need to apply for Sliding Scale program since you have no insurance on the day of appointment at the medicine clinic on 4/29/24

## 2024-04-23 LAB
% GAMMA, URINE: 14 % — SIGNIFICANT CHANGE UP
ALBUMIN 24H MFR UR ELPH: 11.1 % — SIGNIFICANT CHANGE UP
ALPHA1 GLOB 24H MFR UR ELPH: 27.8 % — SIGNIFICANT CHANGE UP
ALPHA2 GLOB 24H MFR UR ELPH: 40.5 % — SIGNIFICANT CHANGE UP
ANA TITR SER: NEGATIVE — SIGNIFICANT CHANGE UP
B-GLOBULIN 24H MFR UR ELPH: 6.6 % — SIGNIFICANT CHANGE UP
COLLECT DURATION TIME UR: 24 HR — SIGNIFICANT CHANGE UP
INTERPRETATION 24H UR IFE-IMP: SIGNIFICANT CHANGE UP
M PROTEIN 24H UR ELPH-MRATE: 0 % — SIGNIFICANT CHANGE UP
M PROTEIN 24H UR ELPH-MRATE: 0 MG/24HR — SIGNIFICANT CHANGE UP (ref 0–0)
M PROTEIN 24H UR ELPH-MRATE: 0 MG/DL — SIGNIFICANT CHANGE UP
PROT ?TM UR-MCNC: 6 MG/DL — SIGNIFICANT CHANGE UP (ref 0–12)
PROT PATTERN 24H UR ELPH-IMP: SIGNIFICANT CHANGE UP
PROTEIN QUANT CALC, URINE: 54 MG/24 H — SIGNIFICANT CHANGE UP (ref 50–100)
TOTAL VOLUME - 24 HOUR: 900 ML — SIGNIFICANT CHANGE UP
URINE CREATININE CALCULATION: 0.6 G/24 H — LOW (ref 0.8–1.8)

## 2024-04-25 ENCOUNTER — NON-APPOINTMENT (OUTPATIENT)
Age: 59
End: 2024-04-25

## 2024-04-29 ENCOUNTER — APPOINTMENT (OUTPATIENT)
Dept: INTERNAL MEDICINE | Facility: CLINIC | Age: 59
End: 2024-04-29

## 2024-05-04 LAB
CORTICOSTEROID BINDING GLOBULIN RESULT: 2.7 MG/DL — SIGNIFICANT CHANGE UP
CORTIS F/TOTAL MFR SERPL: 4.3 % — SIGNIFICANT CHANGE UP
CORTIS SERPL-MCNC: 11 UG/DL — SIGNIFICANT CHANGE UP
CORTISOL, FREE RESULT: 0.47 UG/DL — SIGNIFICANT CHANGE UP

## 2024-05-30 ENCOUNTER — APPOINTMENT (OUTPATIENT)
Dept: ENDOCRINOLOGY | Facility: HOSPITAL | Age: 59
End: 2024-05-30

## 2024-06-01 LAB
CULTURE RESULTS: SIGNIFICANT CHANGE UP
SPECIMEN SOURCE: SIGNIFICANT CHANGE UP

## 2024-06-05 LAB
CULTURE RESULTS: SIGNIFICANT CHANGE UP
SPECIMEN SOURCE: SIGNIFICANT CHANGE UP

## 2024-06-19 LAB
HGB BLDA-MCNC: 12.2 G/DL — SIGNIFICANT CHANGE UP (ref 11.7–16.1)
HGB FLD-MCNC: 12.2 G/DL — SIGNIFICANT CHANGE UP (ref 11.7–16.5)
OXYHGB MFR BLDA: 95.4 % — HIGH (ref 90–95)
OXYHGB MFR BLDMV: 72.8 % — LOW (ref 90–95)
SAO2 % BLD: 73.4 % — SIGNIFICANT CHANGE UP (ref 60–90)
SAO2 % BLDA: 95.8 % — SIGNIFICANT CHANGE UP (ref 94–98)

## 2024-08-22 PROCEDURE — 83880 ASSAY OF NATRIURETIC PEPTIDE: CPT

## 2024-08-22 PROCEDURE — 82803 BLOOD GASES ANY COMBINATION: CPT

## 2024-08-22 PROCEDURE — 84166 PROTEIN E-PHORESIS/URINE/CSF: CPT

## 2024-08-22 PROCEDURE — 86036 ANCA SCREEN EACH ANTIBODY: CPT

## 2024-08-22 PROCEDURE — 86703 HIV-1/HIV-2 1 RESULT ANTBDY: CPT

## 2024-08-22 PROCEDURE — 82435 ASSAY OF BLOOD CHLORIDE: CPT

## 2024-08-22 PROCEDURE — 80053 COMPREHEN METABOLIC PANEL: CPT

## 2024-08-22 PROCEDURE — 71275 CT ANGIOGRAPHY CHEST: CPT | Mod: MC

## 2024-08-22 PROCEDURE — 99285 EMERGENCY DEPT VISIT HI MDM: CPT | Mod: 25

## 2024-08-22 PROCEDURE — 85025 COMPLETE CBC W/AUTO DIFF WBC: CPT

## 2024-08-22 PROCEDURE — 84436 ASSAY OF TOTAL THYROXINE: CPT

## 2024-08-22 PROCEDURE — 84484 ASSAY OF TROPONIN QUANT: CPT

## 2024-08-22 PROCEDURE — C8929: CPT

## 2024-08-22 PROCEDURE — C1894: CPT

## 2024-08-22 PROCEDURE — 86618 LYME DISEASE ANTIBODY: CPT

## 2024-08-22 PROCEDURE — 82962 GLUCOSE BLOOD TEST: CPT

## 2024-08-22 PROCEDURE — 84443 ASSAY THYROID STIM HORMONE: CPT

## 2024-08-22 PROCEDURE — 87086 URINE CULTURE/COLONY COUNT: CPT

## 2024-08-22 PROCEDURE — 87116 MYCOBACTERIA CULTURE: CPT

## 2024-08-22 PROCEDURE — 96375 TX/PRO/DX INJ NEW DRUG ADDON: CPT

## 2024-08-22 PROCEDURE — 86900 BLOOD TYPING SEROLOGIC ABO: CPT

## 2024-08-22 PROCEDURE — 86850 RBC ANTIBODY SCREEN: CPT

## 2024-08-22 PROCEDURE — 86480 TB TEST CELL IMMUN MEASURE: CPT

## 2024-08-22 PROCEDURE — 86720 LEPTOSPIRA ANTIBODY: CPT

## 2024-08-22 PROCEDURE — 0225U NFCT DS DNA&RNA 21 SARSCOV2: CPT

## 2024-08-22 PROCEDURE — 85014 HEMATOCRIT: CPT

## 2024-08-22 PROCEDURE — 84439 ASSAY OF FREE THYROXINE: CPT

## 2024-08-22 PROCEDURE — 83605 ASSAY OF LACTIC ACID: CPT

## 2024-08-22 PROCEDURE — 86334 IMMUNOFIX E-PHORESIS SERUM: CPT

## 2024-08-22 PROCEDURE — 85027 COMPLETE CBC AUTOMATED: CPT

## 2024-08-22 PROCEDURE — 82947 ASSAY GLUCOSE BLOOD QUANT: CPT

## 2024-08-22 PROCEDURE — 86160 COMPLEMENT ANTIGEN: CPT

## 2024-08-22 PROCEDURE — 84165 PROTEIN E-PHORESIS SERUM: CPT

## 2024-08-22 PROCEDURE — 85652 RBC SED RATE AUTOMATED: CPT

## 2024-08-22 PROCEDURE — 85730 THROMBOPLASTIN TIME PARTIAL: CPT

## 2024-08-22 PROCEDURE — 86431 RHEUMATOID FACTOR QUANT: CPT

## 2024-08-22 PROCEDURE — 93308 TTE F-UP OR LMTD: CPT

## 2024-08-22 PROCEDURE — 82728 ASSAY OF FERRITIN: CPT

## 2024-08-22 PROCEDURE — 87641 MR-STAPH DNA AMP PROBE: CPT

## 2024-08-22 PROCEDURE — 85610 PROTHROMBIN TIME: CPT

## 2024-08-22 PROCEDURE — 85018 HEMOGLOBIN: CPT

## 2024-08-22 PROCEDURE — 85520 HEPARIN ASSAY: CPT

## 2024-08-22 PROCEDURE — 86235 NUCLEAR ANTIGEN ANTIBODY: CPT

## 2024-08-22 PROCEDURE — 86225 DNA ANTIBODY NATIVE: CPT

## 2024-08-22 PROCEDURE — 84155 ASSAY OF PROTEIN SERUM: CPT

## 2024-08-22 PROCEDURE — 76536 US EXAM OF HEAD AND NECK: CPT

## 2024-08-22 PROCEDURE — C1889: CPT

## 2024-08-22 PROCEDURE — 84100 ASSAY OF PHOSPHORUS: CPT

## 2024-08-22 PROCEDURE — 83521 IG LIGHT CHAINS FREE EACH: CPT

## 2024-08-22 PROCEDURE — 74177 CT ABD & PELVIS W/CONTRAST: CPT | Mod: MC

## 2024-08-22 PROCEDURE — 87040 BLOOD CULTURE FOR BACTERIA: CPT

## 2024-08-22 PROCEDURE — 86038 ANTINUCLEAR ANTIBODIES: CPT

## 2024-08-22 PROCEDURE — 86901 BLOOD TYPING SEROLOGIC RH(D): CPT

## 2024-08-22 PROCEDURE — 84132 ASSAY OF SERUM POTASSIUM: CPT

## 2024-08-22 PROCEDURE — 87389 HIV-1 AG W/HIV-1&-2 AB AG IA: CPT

## 2024-08-22 PROCEDURE — 70486 CT MAXILLOFACIAL W/O DYE: CPT | Mod: MC

## 2024-08-22 PROCEDURE — C1769: CPT

## 2024-08-22 PROCEDURE — 83735 ASSAY OF MAGNESIUM: CPT

## 2024-08-22 PROCEDURE — 82164 ANGIOTENSIN I ENZYME TEST: CPT

## 2024-08-22 PROCEDURE — 84445 ASSAY OF TSI GLOBULIN: CPT

## 2024-08-22 PROCEDURE — 87015 SPECIMEN INFECT AGNT CONCNTJ: CPT

## 2024-08-22 PROCEDURE — 86140 C-REACTIVE PROTEIN: CPT

## 2024-08-22 PROCEDURE — 82652 VIT D 1 25-DIHYDROXY: CPT

## 2024-08-22 PROCEDURE — 84145 PROCALCITONIN (PCT): CPT

## 2024-08-22 PROCEDURE — 87206 SMEAR FLUORESCENT/ACID STAI: CPT

## 2024-08-22 PROCEDURE — 70450 CT HEAD/BRAIN W/O DYE: CPT | Mod: MC

## 2024-08-22 PROCEDURE — 87637 SARSCOV2&INF A&B&RSV AMP PRB: CPT

## 2024-08-22 PROCEDURE — 82306 VITAMIN D 25 HYDROXY: CPT

## 2024-08-22 PROCEDURE — 83520 IMMUNOASSAY QUANT NOS NONAB: CPT

## 2024-08-22 PROCEDURE — 80299 QUANTITATIVE ASSAY DRUG: CPT

## 2024-08-22 PROCEDURE — 96374 THER/PROPH/DIAG INJ IV PUSH: CPT

## 2024-08-22 PROCEDURE — 80048 BASIC METABOLIC PNL TOTAL CA: CPT

## 2024-08-22 PROCEDURE — 82330 ASSAY OF CALCIUM: CPT

## 2024-08-22 PROCEDURE — 81001 URINALYSIS AUTO W/SCOPE: CPT

## 2024-08-22 PROCEDURE — 93005 ELECTROCARDIOGRAM TRACING: CPT

## 2024-08-22 PROCEDURE — C1887: CPT

## 2024-08-22 PROCEDURE — 84295 ASSAY OF SERUM SODIUM: CPT

## 2024-08-22 PROCEDURE — 71046 X-RAY EXAM CHEST 2 VIEWS: CPT

## 2024-08-22 PROCEDURE — 84480 ASSAY TRIIODOTHYRONINE (T3): CPT

## 2024-08-22 PROCEDURE — 36415 COLL VENOUS BLD VENIPUNCTURE: CPT

## 2024-08-22 PROCEDURE — 87640 STAPH A DNA AMP PROBE: CPT

## 2024-08-22 PROCEDURE — 82533 TOTAL CORTISOL: CPT

## 2024-08-22 PROCEDURE — 93460 R&L HRT ART/VENTRICLE ANGIO: CPT
